# Patient Record
Sex: FEMALE | Race: WHITE | NOT HISPANIC OR LATINO | Employment: PART TIME | URBAN - METROPOLITAN AREA
[De-identification: names, ages, dates, MRNs, and addresses within clinical notes are randomized per-mention and may not be internally consistent; named-entity substitution may affect disease eponyms.]

---

## 2017-01-22 ENCOUNTER — HOSPITAL ENCOUNTER (EMERGENCY)
Facility: HOSPITAL | Age: 42
Discharge: HOME | End: 2017-01-22
Attending: EMERGENCY MEDICINE | Admitting: EMERGENCY MEDICINE

## 2017-01-22 ENCOUNTER — APPOINTMENT (EMERGENCY)
Dept: CT IMAGING | Facility: HOSPITAL | Age: 42
End: 2017-01-22

## 2017-01-22 ENCOUNTER — APPOINTMENT (EMERGENCY)
Dept: ULTRASOUND IMAGING | Facility: HOSPITAL | Age: 42
End: 2017-01-22

## 2017-01-22 VITALS
OXYGEN SATURATION: 100 % | DIASTOLIC BLOOD PRESSURE: 69 MMHG | BODY MASS INDEX: 22.47 KG/M2 | HEIGHT: 61 IN | TEMPERATURE: 97.6 F | HEART RATE: 95 BPM | SYSTOLIC BLOOD PRESSURE: 118 MMHG | WEIGHT: 119 LBS | RESPIRATION RATE: 16 BRPM

## 2017-01-22 DIAGNOSIS — K57.92 DIVERTICULITIS: Primary | ICD-10-CM

## 2017-01-22 LAB
ALBUMIN SERPL BCP-MCNC: 3.7 G/DL (ref 3.5–5)
ALP SERPL-CCNC: 51 U/L (ref 46–116)
ALT SERPL W P-5'-P-CCNC: 14 U/L (ref 12–78)
ANION GAP SERPL CALCULATED.3IONS-SCNC: 9 MMOL/L (ref 4–13)
AST SERPL W P-5'-P-CCNC: 12 U/L (ref 5–45)
BASOPHILS # BLD AUTO: 0.02 THOUSANDS/ΜL (ref 0–0.1)
BASOPHILS NFR BLD AUTO: 0 % (ref 0–1)
BILIRUB SERPL-MCNC: 0.7 MG/DL (ref 0.2–1)
BUN SERPL-MCNC: 12 MG/DL (ref 5–25)
CALCIUM SERPL-MCNC: 8.8 MG/DL (ref 8.3–10.1)
CHLORIDE SERPL-SCNC: 103 MMOL/L (ref 100–108)
CLARITY, POC: CLEAR
CO2 SERPL-SCNC: 28 MMOL/L (ref 21–32)
COLOR, POC: YELLOW
CREAT SERPL-MCNC: 0.69 MG/DL (ref 0.6–1.3)
EOSINOPHIL # BLD AUTO: 0.04 THOUSAND/ΜL (ref 0–0.61)
EOSINOPHIL NFR BLD AUTO: 0 % (ref 0–6)
ERYTHROCYTE [DISTWIDTH] IN BLOOD BY AUTOMATED COUNT: 11.8 % (ref 11.6–15.1)
EXT BILIRUBIN, UA: NEGATIVE
EXT BLOOD URINE: NEGATIVE
EXT GLUCOSE, UA: NEGATIVE
EXT KETONES: NEGATIVE
EXT NITRITE, UA: NEGATIVE
EXT PH, UA: 6
EXT PROTEIN, UA: NEGATIVE
EXT SPECIFIC GRAVITY, UA: 1.02
EXT UROBILINOGEN: 0.2
GFR SERPL CREATININE-BSD FRML MDRD: >60 ML/MIN/1.73SQ M
GLUCOSE SERPL-MCNC: 89 MG/DL (ref 65–140)
HCG UR QL: NEGATIVE
HCT VFR BLD AUTO: 38.8 % (ref 34.8–46.1)
HGB BLD-MCNC: 13.3 G/DL (ref 11.5–15.4)
LYMPHOCYTES # BLD AUTO: 2.8 THOUSANDS/ΜL (ref 0.6–4.47)
LYMPHOCYTES NFR BLD AUTO: 18 % (ref 14–44)
MCH RBC QN AUTO: 32.6 PG (ref 26.8–34.3)
MCHC RBC AUTO-ENTMCNC: 34.3 G/DL (ref 31.4–37.4)
MCV RBC AUTO: 95 FL (ref 82–98)
MONOCYTES # BLD AUTO: 1.21 THOUSAND/ΜL (ref 0.17–1.22)
MONOCYTES NFR BLD AUTO: 8 % (ref 4–12)
NEUTROPHILS # BLD AUTO: 11.67 THOUSANDS/ΜL (ref 1.85–7.62)
NEUTS SEG NFR BLD AUTO: 74 % (ref 43–75)
NRBC BLD AUTO-RTO: 0 /100 WBCS
PLATELET # BLD AUTO: 267 THOUSANDS/UL (ref 149–390)
PMV BLD AUTO: 11.3 FL (ref 8.9–12.7)
POTASSIUM SERPL-SCNC: 3.8 MMOL/L (ref 3.5–5.3)
PROT SERPL-MCNC: 7.3 G/DL (ref 6.4–8.2)
RBC # BLD AUTO: 4.08 MILLION/UL (ref 3.81–5.12)
SODIUM SERPL-SCNC: 140 MMOL/L (ref 136–145)
WBC # BLD AUTO: 15.82 THOUSAND/UL (ref 4.31–10.16)
WBC # BLD EST: NEGATIVE 10*3/UL

## 2017-01-22 PROCEDURE — 99284 EMERGENCY DEPT VISIT MOD MDM: CPT

## 2017-01-22 PROCEDURE — 96374 THER/PROPH/DIAG INJ IV PUSH: CPT

## 2017-01-22 PROCEDURE — 80053 COMPREHEN METABOLIC PANEL: CPT | Performed by: EMERGENCY MEDICINE

## 2017-01-22 PROCEDURE — 76856 US EXAM PELVIC COMPLETE: CPT

## 2017-01-22 PROCEDURE — 74177 CT ABD & PELVIS W/CONTRAST: CPT

## 2017-01-22 PROCEDURE — 85025 COMPLETE CBC W/AUTO DIFF WBC: CPT | Performed by: EMERGENCY MEDICINE

## 2017-01-22 PROCEDURE — 81025 URINE PREGNANCY TEST: CPT | Performed by: EMERGENCY MEDICINE

## 2017-01-22 PROCEDURE — 81002 URINALYSIS NONAUTO W/O SCOPE: CPT | Performed by: EMERGENCY MEDICINE

## 2017-01-22 PROCEDURE — 36415 COLL VENOUS BLD VENIPUNCTURE: CPT | Performed by: EMERGENCY MEDICINE

## 2017-01-22 RX ORDER — HYDROCODONE BITARTRATE AND ACETAMINOPHEN 5; 325 MG/1; MG/1
1 TABLET ORAL EVERY 6 HOURS PRN
Qty: 15 TABLET | Refills: 0 | Status: SHIPPED | OUTPATIENT
Start: 2017-01-22 | End: 2017-01-22 | Stop reason: CLARIF

## 2017-01-22 RX ORDER — KETOROLAC TROMETHAMINE 30 MG/ML
15 INJECTION, SOLUTION INTRAMUSCULAR; INTRAVENOUS ONCE
Status: DISCONTINUED | OUTPATIENT
Start: 2017-01-22 | End: 2017-01-22 | Stop reason: HOSPADM

## 2017-01-22 RX ORDER — TRAMADOL HYDROCHLORIDE 50 MG/1
50 TABLET ORAL EVERY 6 HOURS PRN
Qty: 15 TABLET | Refills: 0 | Status: SHIPPED | OUTPATIENT
Start: 2017-01-22 | End: 2020-10-05 | Stop reason: ALTCHOICE

## 2017-01-22 RX ORDER — KETOROLAC TROMETHAMINE 30 MG/ML
15 INJECTION, SOLUTION INTRAMUSCULAR; INTRAVENOUS ONCE
Status: COMPLETED | OUTPATIENT
Start: 2017-01-22 | End: 2017-01-22

## 2017-01-22 RX ORDER — CIPROFLOXACIN 500 MG/1
500 TABLET, FILM COATED ORAL ONCE
Status: COMPLETED | OUTPATIENT
Start: 2017-01-22 | End: 2017-01-22

## 2017-01-22 RX ORDER — METRONIDAZOLE 500 MG/1
500 TABLET ORAL ONCE
Status: COMPLETED | OUTPATIENT
Start: 2017-01-22 | End: 2017-01-22

## 2017-01-22 RX ORDER — METRONIDAZOLE 500 MG/1
500 TABLET ORAL 3 TIMES DAILY
Qty: 30 TABLET | Refills: 0 | Status: SHIPPED | OUTPATIENT
Start: 2017-01-22 | End: 2017-02-01

## 2017-01-22 RX ORDER — CIPROFLOXACIN 500 MG/1
500 TABLET, FILM COATED ORAL 2 TIMES DAILY
Qty: 20 TABLET | Refills: 0 | Status: SHIPPED | OUTPATIENT
Start: 2017-01-22 | End: 2017-02-01

## 2017-01-22 RX ADMIN — CIPROFLOXACIN HYDROCHLORIDE 500 MG: 500 TABLET, FILM COATED ORAL at 16:46

## 2017-01-22 RX ADMIN — IOHEXOL 100 ML: 350 INJECTION, SOLUTION INTRAVENOUS at 15:56

## 2017-01-22 RX ADMIN — METRONIDAZOLE 500 MG: 500 TABLET ORAL at 16:46

## 2017-01-22 RX ADMIN — KETOROLAC TROMETHAMINE 15 MG: 30 INJECTION, SOLUTION INTRAMUSCULAR at 15:16

## 2018-03-10 ENCOUNTER — HOSPITAL ENCOUNTER (EMERGENCY)
Facility: HOSPITAL | Age: 43
Discharge: HOME | End: 2018-03-10
Attending: EMERGENCY MEDICINE | Admitting: EMERGENCY MEDICINE

## 2018-03-10 VITALS
TEMPERATURE: 98.5 F | HEART RATE: 75 BPM | SYSTOLIC BLOOD PRESSURE: 115 MMHG | DIASTOLIC BLOOD PRESSURE: 72 MMHG | OXYGEN SATURATION: 100 % | HEIGHT: 61 IN | RESPIRATION RATE: 18 BRPM | BODY MASS INDEX: 22.28 KG/M2 | WEIGHT: 118 LBS

## 2018-03-10 DIAGNOSIS — R10.32 LEFT LOWER QUADRANT PAIN: Primary | ICD-10-CM

## 2018-03-10 DIAGNOSIS — K57.92 DIVERTICULITIS: ICD-10-CM

## 2018-03-10 LAB
ALBUMIN SERPL BCP-MCNC: 3.7 G/DL (ref 3.5–5)
ALP SERPL-CCNC: 56 U/L (ref 46–116)
ALT SERPL W P-5'-P-CCNC: 23 U/L (ref 12–78)
ANION GAP SERPL CALCULATED.3IONS-SCNC: 8 MMOL/L (ref 4–13)
AST SERPL W P-5'-P-CCNC: 13 U/L (ref 5–45)
BASOPHILS # BLD AUTO: 0.05 THOUSANDS/ΜL (ref 0–0.1)
BASOPHILS NFR BLD AUTO: 1 % (ref 0–1)
BILIRUB SERPL-MCNC: 0.2 MG/DL (ref 0.2–1)
BILIRUB UR QL STRIP: NEGATIVE
BUN SERPL-MCNC: 12 MG/DL (ref 5–25)
CALCIUM SERPL-MCNC: 8.9 MG/DL (ref 8.3–10.1)
CHLORIDE SERPL-SCNC: 104 MMOL/L (ref 100–108)
CLARITY UR: CLEAR
CO2 SERPL-SCNC: 28 MMOL/L (ref 21–32)
COLOR UR: YELLOW
CREAT SERPL-MCNC: 0.75 MG/DL (ref 0.6–1.3)
EOSINOPHIL # BLD AUTO: 0.2 THOUSAND/ΜL (ref 0–0.61)
EOSINOPHIL NFR BLD AUTO: 2 % (ref 0–6)
ERYTHROCYTE [DISTWIDTH] IN BLOOD BY AUTOMATED COUNT: 11.9 % (ref 11.6–15.1)
EXT PREG TEST URINE: NEGATIVE
GFR SERPL CREATININE-BSD FRML MDRD: 99 ML/MIN/1.73SQ M
GLUCOSE SERPL-MCNC: 95 MG/DL (ref 65–140)
GLUCOSE UR STRIP-MCNC: NEGATIVE MG/DL
HCT VFR BLD AUTO: 38.5 % (ref 34.8–46.1)
HGB BLD-MCNC: 13.4 G/DL (ref 11.5–15.4)
HGB UR QL STRIP.AUTO: NEGATIVE
KETONES UR STRIP-MCNC: NEGATIVE MG/DL
LACTATE SERPL-SCNC: 0.9 MMOL/L (ref 0.5–2)
LEUKOCYTE ESTERASE UR QL STRIP: NEGATIVE
LIPASE SERPL-CCNC: 95 U/L (ref 73–393)
LYMPHOCYTES # BLD AUTO: 3.07 THOUSANDS/ΜL (ref 0.6–4.47)
LYMPHOCYTES NFR BLD AUTO: 29 % (ref 14–44)
MCH RBC QN AUTO: 33.8 PG (ref 26.8–34.3)
MCHC RBC AUTO-ENTMCNC: 34.8 G/DL (ref 31.4–37.4)
MCV RBC AUTO: 97 FL (ref 82–98)
MONOCYTES # BLD AUTO: 0.6 THOUSAND/ΜL (ref 0.17–1.22)
MONOCYTES NFR BLD AUTO: 6 % (ref 4–12)
NEUTROPHILS # BLD AUTO: 6.8 THOUSANDS/ΜL (ref 1.85–7.62)
NEUTS SEG NFR BLD AUTO: 63 % (ref 43–75)
NITRITE UR QL STRIP: NEGATIVE
NRBC BLD AUTO-RTO: 0 /100 WBCS
PH UR STRIP.AUTO: 5.5 [PH] (ref 4.5–8)
PLATELET # BLD AUTO: 259 THOUSANDS/UL (ref 149–390)
PMV BLD AUTO: 10.9 FL (ref 8.9–12.7)
POTASSIUM SERPL-SCNC: 3.7 MMOL/L (ref 3.5–5.3)
PROT SERPL-MCNC: 6.6 G/DL (ref 6.4–8.2)
PROT UR STRIP-MCNC: NEGATIVE MG/DL
RBC # BLD AUTO: 3.97 MILLION/UL (ref 3.81–5.12)
SODIUM SERPL-SCNC: 140 MMOL/L (ref 136–145)
SP GR UR STRIP.AUTO: 1.01 (ref 1–1.03)
UROBILINOGEN UR QL STRIP.AUTO: 0.2 E.U./DL
WBC # BLD AUTO: 10.76 THOUSAND/UL (ref 4.31–10.16)

## 2018-03-10 PROCEDURE — 99284 EMERGENCY DEPT VISIT MOD MDM: CPT

## 2018-03-10 PROCEDURE — 81025 URINE PREGNANCY TEST: CPT | Performed by: EMERGENCY MEDICINE

## 2018-03-10 PROCEDURE — 96360 HYDRATION IV INFUSION INIT: CPT

## 2018-03-10 PROCEDURE — 83605 ASSAY OF LACTIC ACID: CPT | Performed by: EMERGENCY MEDICINE

## 2018-03-10 PROCEDURE — 36415 COLL VENOUS BLD VENIPUNCTURE: CPT | Performed by: EMERGENCY MEDICINE

## 2018-03-10 PROCEDURE — 80053 COMPREHEN METABOLIC PANEL: CPT | Performed by: EMERGENCY MEDICINE

## 2018-03-10 PROCEDURE — 83690 ASSAY OF LIPASE: CPT | Performed by: EMERGENCY MEDICINE

## 2018-03-10 PROCEDURE — 81003 URINALYSIS AUTO W/O SCOPE: CPT | Performed by: EMERGENCY MEDICINE

## 2018-03-10 PROCEDURE — 85025 COMPLETE CBC W/AUTO DIFF WBC: CPT | Performed by: EMERGENCY MEDICINE

## 2018-03-10 RX ORDER — METRONIDAZOLE 500 MG/1
500 TABLET ORAL ONCE
Status: COMPLETED | OUTPATIENT
Start: 2018-03-10 | End: 2018-03-10

## 2018-03-10 RX ORDER — METOCLOPRAMIDE HYDROCHLORIDE 5 MG/ML
10 INJECTION INTRAMUSCULAR; INTRAVENOUS ONCE
Status: DISCONTINUED | OUTPATIENT
Start: 2018-03-10 | End: 2018-03-10

## 2018-03-10 RX ORDER — METRONIDAZOLE 500 MG/1
500 TABLET ORAL EVERY 8 HOURS SCHEDULED
Qty: 21 TABLET | Refills: 0 | Status: SHIPPED | OUTPATIENT
Start: 2018-03-10 | End: 2018-03-17

## 2018-03-10 RX ORDER — CIPROFLOXACIN 500 MG/1
500 TABLET, FILM COATED ORAL EVERY 12 HOURS SCHEDULED
Qty: 14 TABLET | Refills: 0 | Status: SHIPPED | OUTPATIENT
Start: 2018-03-10 | End: 2018-03-17

## 2018-03-10 RX ORDER — KETOROLAC TROMETHAMINE 30 MG/ML
15 INJECTION, SOLUTION INTRAMUSCULAR; INTRAVENOUS ONCE
Status: DISCONTINUED | OUTPATIENT
Start: 2018-03-10 | End: 2018-03-10

## 2018-03-10 RX ORDER — ACETAMINOPHEN AND CODEINE PHOSPHATE 300; 30 MG/1; MG/1
1 TABLET ORAL EVERY 6 HOURS PRN
Qty: 8 TABLET | Refills: 0 | Status: SHIPPED | OUTPATIENT
Start: 2018-03-10 | End: 2018-03-20

## 2018-03-10 RX ORDER — CIPROFLOXACIN 500 MG/1
500 TABLET, FILM COATED ORAL ONCE
Status: COMPLETED | OUTPATIENT
Start: 2018-03-10 | End: 2018-03-10

## 2018-03-10 RX ADMIN — CIPROFLOXACIN HYDROCHLORIDE 500 MG: 500 TABLET, FILM COATED ORAL at 01:38

## 2018-03-10 RX ADMIN — SODIUM CHLORIDE 1000 ML: 0.9 INJECTION, SOLUTION INTRAVENOUS at 01:45

## 2018-03-10 RX ADMIN — METRONIDAZOLE 500 MG: 500 TABLET ORAL at 01:39

## 2018-03-10 NOTE — ED PROVIDER NOTES
History  Chief Complaint   Patient presents with    Abdominal Pain     Pt c/o lower abdominal pain worsening over the past 4 hours  Also c/o nausea, but denies vomiting or diarrhea  Pt states it feels "just like diverticulitis " Also c/o chills     Patient is a 70-year-old female with past medical history of migraines and 1 prior episode of diverticulitis about 6 months ago, and surgical history of appendectomy 10 years ago, presents to the emergency department complaining of lower abdominal pain, nausea and chills  Patient states this afternoon she began having lower abdominal pain, more localized to the left lower quadrant  It initially started as crampy pain but has been getting progressively worse throughout the day  She also has felt intermittent nausea and has had alternating chills and sweats  She took Advil for the pain at around midnight however decided to come to the ED because her symptoms feel exactly the same as when she was diagnosed with diverticulitis last summer  She states when she was diagnosed with diverticulitis, she left the pain go for about 3 days and got very sick  She states today, she did not want to wait and wanted to get started on antibiotics because her symptoms feel similar  On review of systems, she does admit to feeling constipated over the past 2 days  She states she is still having bowel movements but they are very small and hard  She denies any fever, headache, dizziness or near syncope, URI symptoms or cough, chest pain, palpitations, shortness of breath, vomiting, diarrhea, blood per rectum or melena, dysuria, frequency, hematuria, flank pain, vaginal bleeding or discharge, skin rash or color change, extremity weakness or paresthesia or other focal neurologic deficits  History provided by:  Patient   used: No        Prior to Admission Medications   Prescriptions Last Dose Informant Patient Reported?  Taking?   traMADol (ULTRAM) 50 mg tablet No No   Sig: Take 1 tablet by mouth every 6 (six) hours as needed for moderate pain for up to 15 doses      Facility-Administered Medications: None       History reviewed  No pertinent past medical history  Past Surgical History:   Procedure Laterality Date    APPENDECTOMY         History reviewed  No pertinent family history  I have reviewed and agree with the history as documented  Social History   Substance Use Topics    Smoking status: Former Smoker     Quit date: 12/16/2014    Smokeless tobacco: Never Used    Alcohol use No        Review of Systems   Constitutional: Positive for chills  Negative for fever  HENT: Negative for congestion, ear pain, rhinorrhea and sore throat  Respiratory: Negative for cough, chest tightness, shortness of breath and wheezing  Cardiovascular: Negative for chest pain and palpitations  Gastrointestinal: Positive for abdominal pain, constipation and nausea  Negative for abdominal distention, blood in stool, diarrhea and vomiting  Genitourinary: Negative for difficulty urinating, dysuria, flank pain, frequency, hematuria, vaginal bleeding and vaginal discharge  Musculoskeletal: Negative for back pain and neck pain  Skin: Negative for color change, pallor and rash  Allergic/Immunologic: Negative for immunocompromised state  Neurological: Negative for dizziness, syncope, weakness, light-headedness, numbness and headaches  Hematological: Negative for adenopathy  Psychiatric/Behavioral: Negative for confusion and decreased concentration  All other systems reviewed and are negative        Physical Exam  ED Triage Vitals   Temperature Pulse Respirations Blood Pressure SpO2   03/10/18 0024 03/10/18 0024 03/10/18 0024 03/10/18 0024 03/10/18 0024   98 5 °F (36 9 °C) 99 18 (!) 180/80 99 %      Temp Source Heart Rate Source Patient Position - Orthostatic VS BP Location FiO2 (%)   03/10/18 0024 03/10/18 0024 03/10/18 0024 03/10/18 0024 --   Oral Monitor Lying Right arm       Pain Score       03/10/18 0025       8         Vitals:    03/10/18 0024 03/10/18 0025 03/10/18 0226   BP: (!) 180/80  115/72   BP Location: Right arm  Right arm   Pulse: 99  75   Resp: 18 18   Temp: 98 5 °F (36 9 °C)     TempSrc: Oral     SpO2: 99%  100%   Weight:  53 5 kg (118 lb)    Height:  5' 1" (1 549 m)      Physical Exam   Constitutional: She is oriented to person, place, and time  She appears well-developed and well-nourished  No distress  HENT:   Head: Normocephalic and atraumatic  Mouth/Throat: Oropharynx is clear and moist    Eyes: Conjunctivae and EOM are normal  Pupils are equal, round, and reactive to light  Neck: Normal range of motion  Neck supple  No JVD present  Cardiovascular: Normal rate, regular rhythm, normal heart sounds and intact distal pulses  Exam reveals no gallop and no friction rub  No murmur heard  Pulmonary/Chest: Effort normal and breath sounds normal  No respiratory distress  She has no wheezes  She has no rales  She exhibits no tenderness  Abdominal: Soft  Bowel sounds are normal  She exhibits no distension  There is tenderness  There is no rebound and no guarding  Tenderness in the left lower quadrant  No CVA tenderness  Musculoskeletal: Normal range of motion  She exhibits no edema or tenderness  Neurological: She is alert and oriented to person, place, and time  No gross motor or sensory deficits  Skin: Skin is warm and dry  No rash noted  She is not diaphoretic  No erythema  No pallor  Psychiatric: She has a normal mood and affect  Her behavior is normal    Nursing note and vitals reviewed        ED Medications  Medications   sodium chloride 0 9 % bolus 1,000 mL (0 mL Intravenous Stopped 3/10/18 0226)   ciprofloxacin (CIPRO) tablet 500 mg (500 mg Oral Given 3/10/18 0138)   metroNIDAZOLE (FLAGYL) tablet 500 mg (500 mg Oral Given 3/10/18 0139)       Diagnostic Studies  Results Reviewed     Procedure Component Value Units Date/Time POCT pregnancy, urine [[id number]]  (Normal) Resulted:  03/10/18 0220    Lab Status:  Final result Updated:  03/10/18 0220     EXT PREG TEST UR (Ref: Negative) negative    Lactic acid, plasma [[id number]]  (Normal) Collected:  03/10/18 0145    Lab Status:  Final result Specimen:  Blood from Arm, Right Updated:  03/10/18 4859     LACTIC ACID 0 9 mmol/L     Narrative:         Result may be elevated if tourniquet was used during collection  Comprehensive metabolic panel [[id number]] Collected:  03/10/18 0145    Lab Status:  Final result Specimen:  Blood from Arm, Right Updated:  03/10/18 0210     Sodium 140 mmol/L      Potassium 3 7 mmol/L      Chloride 104 mmol/L      CO2 28 mmol/L      Anion Gap 8 mmol/L      BUN 12 mg/dL      Creatinine 0 75 mg/dL      Glucose 95 mg/dL      Calcium 8 9 mg/dL      AST 13 U/L      ALT 23 U/L      Alkaline Phosphatase 56 U/L      Total Protein 6 6 g/dL      Albumin 3 7 g/dL      Total Bilirubin 0 20 mg/dL      eGFR 99 ml/min/1 73sq m     Narrative:         National Kidney Disease Education Program recommendations are as follows:  GFR calculation is accurate only with a steady state creatinine  Chronic Kidney disease less than 60 ml/min/1 73 sq  meters  Kidney failure less than 15 ml/min/1 73 sq  meters      Lipase [[id number]]  (Normal) Collected:  03/10/18 0145    Lab Status:  Final result Specimen:  Blood from Arm, Right Updated:  03/10/18 0210     Lipase 95 u/L     UA w Reflex to Microscopic [[id number]]  (Normal) Collected:  03/10/18 0145    Lab Status:  Final result Specimen:  Urine from Urine, Clean Catch Updated:  03/10/18 0156     Color, UA Yellow     Clarity, UA Clear     Specific Gravity, UA 1 010     pH, UA 5 5     Leukocytes, UA Negative     Nitrite, UA Negative     Protein, UA Negative mg/dl      Glucose, UA Negative mg/dl      Ketones, UA Negative mg/dl      Urobilinogen, UA 0 2 E U /dl      Bilirubin, UA Negative     Blood, UA Negative    CBC and differential [[id number]] (Abnormal) Collected:  03/10/18 0145    Lab Status:  Final result Specimen:  Blood from Arm, Right Updated:  03/10/18 0154     WBC 10 76 (H) Thousand/uL      RBC 3 97 Million/uL      Hemoglobin 13 4 g/dL      Hematocrit 38 5 %      MCV 97 fL      MCH 33 8 pg      MCHC 34 8 g/dL      RDW 11 9 %      MPV 10 9 fL      Platelets 919 Thousands/uL      nRBC 0 /100 WBCs      Neutrophils Relative 63 %      Lymphocytes Relative 29 %      Monocytes Relative 6 %      Eosinophils Relative 2 %      Basophils Relative 1 %      Neutrophils Absolute 6 80 Thousands/µL      Lymphocytes Absolute 3 07 Thousands/µL      Monocytes Absolute 0 60 Thousand/µL      Eosinophils Absolute 0 20 Thousand/µL      Basophils Absolute 0 05 Thousands/µL                  No orders to display              Procedures  Procedures       Phone Contacts  ED Phone Contact    ED Course  ED Course as of Mar 10 0233   Sat Mar 10, 2018   0219 Patient updated that her blood work was unremarkable  Will send home with scripts for Cipro and Flagyl  Offered her pain medication to go home with and she states the only thing she can tolerate is Tylenol with codeine  Discussed ED return parameters including but not limited to worsening or uncontrolled pain, vomiting, fever  MDM  Number of Diagnoses or Management Options  Diagnosis management comments: 25-year-old female presenting with 1 day of left lower quadrant abdominal pain, nausea, constipation and chills  Patient has history of diverticulitis and states that her symptoms feel exactly the same as when she presented with diverticulitis 6 months ago  Recommended getting blood work and abdominal CT scan to confirm diverticulitis, rule out other etiology or complications such as abscess or perforation    Patient does not wish to have the CT scan as she does not have medical insurance and states she is very behind on medical bills and is trying to support her 2 children who are and [facility] on her own  Explained to patient that the CT can exclude complications from diverticulitis such as abscess or perforation, however we came to an agreement to start with blood work and if her blood work is not significantly abnormal, will empirically treat with Cipro and Flagyl and forego this CT scan for now  She is agreeable to get the CT scan if lab work significantly abnormal such as significant leukocytosis or elevated lactic acidosis  Will give IV fluid bolus  Offered Toradol and antiemetic but patient declined and that she is very sensitive to medication and does not wish to have anything at this time  Patient told that whether or not we get the CT scan, if her symptoms are worsening she should return to the ER immediately  Amount and/or Complexity of Data Reviewed  Clinical lab tests: ordered and reviewed      CritCare Time    Disposition  Final diagnoses:   Left lower quadrant pain   Diverticulitis     Time reflects when diagnosis was documented in both MDM as applicable and the Disposition within this note     Time User Action Codes Description Comment    3/10/2018  2:23 AM Moisés PAREDES Add [R10 32] Left lower quadrant pain     3/10/2018  2:23 AM Rufina Noble Add [K57 92] Diverticulitis       ED Disposition     ED Disposition Condition Comment    Discharge  Desirae Corrigan discharge to home/self care      Condition at discharge: Stable        Follow-up Information     Follow up With Specialties Details Why Contact Info Additional Information    [employer]fol[employer]  Call To establish care with a primary care doctor if you do not already have one [address] [facility] Emergency Department Emergency Medicine Go to If symptoms worsen [address] [facility] [address], [address] [place], [place], [address]        Discharge Medication List as of 3/10/2018  2:25 AM      START taking these medications    Details acetaminophen-codeine (TYLENOL #3) 300-30 mg per tablet Take 1 tablet by mouth every 6 (six) hours as needed for moderate pain for up to 10 days, Starting Sat 3/10/2018, Until Tue 3/20/2018, Print      ciprofloxacin (CIPRO) 500 mg tablet Take 1 tablet (500 mg total) by mouth every 12 (twelve) hours for 7 days, Starting Sat 3/10/2018, Until Sat 3/17/2018, Print      metroNIDAZOLE (FLAGYL) 500 mg tablet Take 1 tablet (500 mg total) by mouth every 8 (eight) hours for 7 days, Starting Sat 3/10/2018, Until Sat 3/17/2018, Print         CONTINUE these medications which have NOT CHANGED    Details   traMADol (ULTRAM) 50 mg tablet Take 1 tablet by mouth every 6 (six) hours as needed for moderate pain for up to 15 doses, Starting 1/22/2017, Until Discontinued, Print           No discharge procedures on file      ED Provider  Electronically Signed by           Kd Smith DO  03/10/18 1319

## 2018-03-10 NOTE — DISCHARGE INSTRUCTIONS
Diverticulitis   WHAT YOU NEED TO KNOW:   Diverticulitis is a condition that causes small pockets along your intestine called diverticula to become inflamed or infected  This is caused by hard bowel movements, food, or bacteria that get stuck in the pockets  DISCHARGE INSTRUCTIONS:   Seek care immediately if:   · You have bowel movement or foul-smelling discharge leaking from your vagina or in your urine  · You have severe diarrhea  · You urinate less than usual or not at all  · You are not able to have a bowel movement  · You cannot stop vomiting  · You have severe abdominal pain, a fever, and your abdomen is larger than usual      · You have new or increased blood in your bowel movements  Contact your healthcare provider if:   · You have pain when you urinate  · Your symptoms get worse or do not go away  · You have questions or concerns about your condition or care  Medicines:   · Antibiotics  may be given to help prevent or treat a bacterial infection  · Prescription pain medicine  may be given  Ask your healthcare provider how to take this medicine safely  Some prescription pain medicines contain acetaminophen  Do not take other medicines that contain acetaminophen without talking to your healthcare provider  Too much acetaminophen may cause liver damage  Prescription pain medicine may cause constipation  Ask your healthcare provider how to prevent or treat constipation  · Take your medicine as directed  Contact your healthcare provider if you think your medicine is not helping or if you have side effects  Tell him or her if you are allergic to any medicine  Keep a list of the medicines, vitamins, and herbs you take  Include the amounts, and when and why you take them  Bring the list or the pill bottles to follow-up visits  Carry your medicine list with you in case of an emergency    Clear liquid diet:  A clear liquid diet includes any liquids that you can see through  Examples include water, ginger-margarita, cranberry or apple juice, frozen fruit ice, or broth  Stay on a clear liquid diet until your symptoms are gone, or as directed  Follow up with your healthcare provider as directed: You may need to return for a colonoscopy  When your symptoms are gone, you may need a low-fat, high-fiber diet to help prevent diverticulitis from developing again  Your healthcare provider or dietitian can help you create meal plans  Write down your questions so you remember to ask them during your visits  © 2017 [employer] Information is for End User's use only and may not be sold, redistributed or otherwise used for commercial purposes  All illustrations and images included in CareNotes® are the copyrighted property of [employer]  or [employer]  The above information is an  only  It is not intended as medical advice for individual conditions or treatments  Talk to your doctor, nurse or pharmacist before following any medical regimen to see if it is safe and effective for you  Acute Abdominal Pain   WHAT YOU NEED TO KNOW:   The cause of your abdominal pain may not be found  If a cause is found, treatment will depend on what the cause is  DISCHARGE INSTRUCTIONS:   Seek care immediately if:   · You vomit blood or cannot stop vomiting  · You have blood in your bowel movement or it looks like tar  · You have bleeding from your rectum  · Your abdomen is larger than usual, more painful, and hard  · You have severe pain in your abdomen  · You stop passing gas and having bowel movements  · You feel weak, dizzy, or faint  Contact your healthcare provider if:   · You have a fever  · You have new signs and symptoms  · Your symptoms do not get better with treatment  · You have questions or concerns about your condition or care    Medicines  may be given to decrease pain, treat an infection, and manage your symptoms  Take your medicine as directed  Call your healthcare provider if you think your medicine is not helping or if you have side effects  Tell him if you are allergic to any medicine  Keep a list of the medicines, vitamins, and herbs you take  Include the amounts, and when and why you take them  Bring the list or the pill bottles to follow-up visits  Carry your medicine list with you in case of an emergency  Manage your symptoms:   · Apply heat  on your abdomen for 20 to 30 minutes every 2 hours for as many days as directed  Heat helps decrease pain and muscle spasms  · Manage your stress  Stress may cause abdominal pain  Your healthcare provider may recommend relaxation techniques and deep breathing exercises to help decrease your stress  Your healthcare provider may recommend you talk to someone about your stress or anxiety, such as a counselor or a trusted friend  Get plenty of sleep and exercise regularly  · Limit or do not drink alcohol  Alcohol can make your abdominal pain worse  Ask your healthcare provider if it is safe for you to drink alcohol  Also ask how much is safe for you to drink  · Do not smoke  Nicotine and other chemicals in cigarettes can damage your esophagus and stomach  Ask your healthcare provider for information if you currently smoke and need help to quit  E-cigarettes or smokeless tobacco still contain nicotine  Talk to your healthcare provider before you use these products  Make changes to the food you eat as directed:  Do not eat foods that cause abdominal pain or other symptoms  Eat small meals more often  · Eat more high-fiber foods if you are constipated  High-fiber foods include fruits, vegetables, whole-grain foods, and legumes  · Do not eat foods that cause gas if you have bloating  Examples include broccoli, cabbage, and cauliflower  Do not drink soda or carbonated drinks, because these may also cause gas       · Do not eat foods or drinks that contain sorbitol or fructose if you have diarrhea and bloating  Some examples are fruit juices, candy, jelly, and sugar-free gum  · Do not eat high-fat foods, such as fried foods, cheeseburgers, hot dogs, and desserts  · Limit or do not drink caffeine  Caffeine may make symptoms, such as heart burn or nausea, worse  · Drink plenty of liquids to prevent dehydration from diarrhea or vomiting  Ask your healthcare provider how much liquid to drink each day and which liquids are best for you  Follow up with your healthcare provider as directed:  Write down your questions so you remember to ask them during your visits  © 2017 [address] [facility] Information is for End User's use only and may not be sold, redistributed or otherwise used for commercial purposes  All illustrations and images included in CareNotes® are the copyrighted property of [employer]  or [employer]  The above information is an  only  It is not intended as medical advice for individual conditions or treatments  Talk to your doctor, nurse or pharmacist before following any medical regimen to see if it is safe and effective for you

## 2020-09-24 ENCOUNTER — APPOINTMENT (OUTPATIENT)
Dept: LAB | Facility: HOSPITAL | Age: 45
End: 2020-09-24
Attending: PLASTIC SURGERY

## 2020-09-24 ENCOUNTER — TRANSCRIBE ORDERS (OUTPATIENT)
Dept: LAB | Facility: HOSPITAL | Age: 45
End: 2020-09-24

## 2020-09-24 ENCOUNTER — TRANSCRIBE ORDERS (OUTPATIENT)
Dept: ADMINISTRATIVE | Facility: HOSPITAL | Age: 45
End: 2020-09-24

## 2020-09-24 DIAGNOSIS — Z41.1 ENCOUNTER FOR COSMETIC PROCEDURE: ICD-10-CM

## 2020-09-24 DIAGNOSIS — Z01.812 PRE-OPERATIVE LABORATORY EXAMINATION: Primary | ICD-10-CM

## 2020-09-24 LAB
APTT PPP: 27 SECONDS (ref 23–37)
BASOPHILS # BLD AUTO: 0 THOUSANDS/ΜL (ref 0–0.1)
BASOPHILS NFR BLD AUTO: 0 % (ref 0–1)
EOSINOPHIL # BLD AUTO: 0.1 THOUSAND/ΜL (ref 0–0.4)
EOSINOPHIL NFR BLD AUTO: 1 % (ref 0–6)
ERYTHROCYTE [DISTWIDTH] IN BLOOD BY AUTOMATED COUNT: 12.2 %
HCT VFR BLD AUTO: 40.7 % (ref 36–46)
HGB BLD-MCNC: 13.8 G/DL (ref 12–16)
INR PPP: 0.93 (ref 0.84–1.19)
LYMPHOCYTES # BLD AUTO: 2.6 THOUSANDS/ΜL (ref 0.5–4)
LYMPHOCYTES NFR BLD AUTO: 28 % (ref 25–45)
MCH RBC QN AUTO: 33.4 PG (ref 26–34)
MCHC RBC AUTO-ENTMCNC: 34 G/DL (ref 31–36)
MCV RBC AUTO: 98 FL (ref 80–100)
MONOCYTES # BLD AUTO: 0.5 THOUSAND/ΜL (ref 0.2–0.9)
MONOCYTES NFR BLD AUTO: 5 % (ref 1–10)
NEUTROPHILS # BLD AUTO: 6.2 THOUSANDS/ΜL (ref 1.8–7.8)
NEUTS SEG NFR BLD AUTO: 66 % (ref 45–65)
PLATELET # BLD AUTO: 279 THOUSANDS/UL (ref 150–450)
PMV BLD AUTO: 9.6 FL (ref 8.9–12.7)
PROTHROMBIN TIME: 12.5 SECONDS (ref 11.6–14.5)
RBC # BLD AUTO: 4.15 MILLION/UL (ref 4–5.2)
WBC # BLD AUTO: 9.5 THOUSAND/UL (ref 4.5–11)

## 2020-09-24 PROCEDURE — 85025 COMPLETE CBC W/AUTO DIFF WBC: CPT

## 2020-09-24 PROCEDURE — 85610 PROTHROMBIN TIME: CPT

## 2020-09-24 PROCEDURE — 85730 THROMBOPLASTIN TIME PARTIAL: CPT

## 2020-09-24 PROCEDURE — 36415 COLL VENOUS BLD VENIPUNCTURE: CPT

## 2020-09-28 ENCOUNTER — NURSE TRIAGE (OUTPATIENT)
Dept: OTHER | Facility: OTHER | Age: 45
End: 2020-09-28

## 2020-09-28 DIAGNOSIS — Z11.59 SPECIAL SCREENING EXAMINATION FOR VIRAL DISEASE: Primary | ICD-10-CM

## 2020-09-28 DIAGNOSIS — Z11.59 SPECIAL SCREENING EXAMINATION FOR VIRAL DISEASE: ICD-10-CM

## 2020-09-28 PROCEDURE — U0003 INFECTIOUS AGENT DETECTION BY NUCLEIC ACID (DNA OR RNA); SEVERE ACUTE RESPIRATORY SYNDROME CORONAVIRUS 2 (SARS-COV-2) (CORONAVIRUS DISEASE [COVID-19]), AMPLIFIED PROBE TECHNIQUE, MAKING USE OF HIGH THROUGHPUT TECHNOLOGIES AS DESCRIBED BY CMS-2020-01-R: HCPCS | Performed by: FAMILY MEDICINE

## 2020-09-28 NOTE — TELEPHONE ENCOUNTER
Reason for Disposition   [1] COVID-19 EXPOSURE (Close Contact) within last 14 days AND [2] NO cough, fever, or breathing difficulty    Answer Assessment - Initial Assessment Questions  1  CLOSE CONTACT: "Who is the person with the confirmed or suspected COVID-19 infection that you were exposed to?"      Unknown  2  PLACE of CONTACT: "Where were you when you were exposed to COVID-19?" (e g , home, school, medical waiting room; which city?)      Unknown  3  TYPE of CONTACT: "How much contact was there?" (e g , sitting next to, live in same house, work in same office, same building)      Unknown  4  DURATION of CONTACT: "How long were you in contact with the COVID-19 patient?" (e g , a few seconds, passed by person, a few minutes, live with the patient)      Unknown  5  DATE of CONTACT: "When did you have contact with a COVID-19 patient?" (e g , how many days ago)     Unknown  6  TRAVEL: "Have you traveled out of the country recently?" If so, "When and where?"      * Also ask about out-of-state travel, since the [facility] has identified some high risk cities for community spread in the [address] [place] [address] [address]  * Note: Travel becomes less relevant if there is widespread community transmission where the patient lives  Denies  7  COMMUNITY SPREAD: "Are there lots of cases of COVID-19 (community spread) where you live?" (See public health department website, if unsure)    * MAJOR community spread: high number of cases; numbers of cases are increasing; many people hospitalized  * MINOR community spread: low number of cases; not increasing; few or no people hospitalized      Major   8  SYMPTOMS: "Do you have any symptoms?" (e g , fever, cough, breathing difficulty)      Denies symptoms  9  PREGNANCY OR POSTPARTUM: "Is there any chance you are pregnant?" "When was your last menstrual period?" "Did you deliver in the last 2 weeks?"      Denies, LMP 3 weeks ago,  tubal liation  10  HIGH RISK: "Do you have any heart or lung problems?  Do you have a weak immune system?" (e g , CHF, COPD, asthma, HIV positive, chemotherapy, renal failure, diabetes mellitus, sickle cell anemia)        Heealthy    Protocols used: CORONAVIRUS (COVID-19) - EXPOSURE-ADULT-AH

## 2020-09-28 NOTE — TELEPHONE ENCOUNTER
Regarding: COVID TEST REQUEST ASYMPTOMATIC PRESURGERY  ----- Message from Bart Carrera sent at 9/28/2020  9:28 AM EDT -----  " I have to be tested for surgery  "

## 2020-09-29 LAB — SARS-COV-2 RNA SPEC QL NAA+PROBE: NOT DETECTED

## 2020-10-07 ENCOUNTER — ANESTHESIA EVENT (OUTPATIENT)
Dept: PERIOP | Facility: HOSPITAL | Age: 45
End: 2020-10-07
Payer: SELF-PAY

## 2020-10-08 ENCOUNTER — HOSPITAL ENCOUNTER (OUTPATIENT)
Facility: HOSPITAL | Age: 45
Setting detail: OBSERVATION
Discharge: HOME | End: 2020-10-09
Attending: PLASTIC SURGERY | Admitting: PLASTIC SURGERY
Payer: SELF-PAY

## 2020-10-08 ENCOUNTER — ANESTHESIA (OUTPATIENT)
Dept: PERIOP | Facility: HOSPITAL | Age: 45
End: 2020-10-08
Payer: SELF-PAY

## 2020-10-08 VITALS — HEART RATE: 95 BPM

## 2020-10-08 DIAGNOSIS — E88.1 LIPODYSTROPHY: Primary | ICD-10-CM

## 2020-10-08 LAB
EXT PREGNANCY TEST URINE: NEGATIVE
EXT. CONTROL: NORMAL

## 2020-10-08 PROCEDURE — G0379 DIRECT REFER HOSPITAL OBSERV: HCPCS

## 2020-10-08 PROCEDURE — 81025 URINE PREGNANCY TEST: CPT | Performed by: PLASTIC SURGERY

## 2020-10-08 RX ORDER — CEFAZOLIN SODIUM 1 G/50ML
1000 SOLUTION INTRAVENOUS EVERY 8 HOURS
Status: COMPLETED | OUTPATIENT
Start: 2020-10-08 | End: 2020-10-09

## 2020-10-08 RX ORDER — HYDROMORPHONE HCL 110MG/55ML
PATIENT CONTROLLED ANALGESIA SYRINGE INTRAVENOUS AS NEEDED
Status: DISCONTINUED | OUTPATIENT
Start: 2020-10-08 | End: 2020-10-08

## 2020-10-08 RX ORDER — OXYCODONE HYDROCHLORIDE AND ACETAMINOPHEN 5; 325 MG/1; MG/1
1 TABLET ORAL EVERY 4 HOURS PRN
Status: DISCONTINUED | OUTPATIENT
Start: 2020-10-08 | End: 2020-10-09 | Stop reason: HOSPADM

## 2020-10-08 RX ORDER — HYDROMORPHONE HCL/PF 1 MG/ML
1 SYRINGE (ML) INJECTION EVERY 2 HOUR PRN
Status: DISCONTINUED | OUTPATIENT
Start: 2020-10-08 | End: 2020-10-09 | Stop reason: HOSPADM

## 2020-10-08 RX ORDER — FENTANYL CITRATE 50 UG/ML
INJECTION, SOLUTION INTRAMUSCULAR; INTRAVENOUS AS NEEDED
Status: DISCONTINUED | OUTPATIENT
Start: 2020-10-08 | End: 2020-10-08

## 2020-10-08 RX ORDER — DEXAMETHASONE SODIUM PHOSPHATE 4 MG/ML
INJECTION, SOLUTION INTRA-ARTICULAR; INTRALESIONAL; INTRAMUSCULAR; INTRAVENOUS; SOFT TISSUE AS NEEDED
Status: DISCONTINUED | OUTPATIENT
Start: 2020-10-08 | End: 2020-10-08

## 2020-10-08 RX ORDER — SCOLOPAMINE TRANSDERMAL SYSTEM 1 MG/1
1 PATCH, EXTENDED RELEASE TRANSDERMAL
Status: DISCONTINUED | OUTPATIENT
Start: 2020-10-08 | End: 2020-10-09 | Stop reason: HOSPADM

## 2020-10-08 RX ORDER — GLYCOPYRROLATE 0.2 MG/ML
INJECTION INTRAMUSCULAR; INTRAVENOUS AS NEEDED
Status: DISCONTINUED | OUTPATIENT
Start: 2020-10-08 | End: 2020-10-08

## 2020-10-08 RX ORDER — NEOSTIGMINE METHYLSULFATE 1 MG/ML
INJECTION INTRAVENOUS AS NEEDED
Status: DISCONTINUED | OUTPATIENT
Start: 2020-10-08 | End: 2020-10-08

## 2020-10-08 RX ORDER — ONDANSETRON 2 MG/ML
4 INJECTION INTRAMUSCULAR; INTRAVENOUS ONCE AS NEEDED
Status: COMPLETED | OUTPATIENT
Start: 2020-10-08 | End: 2020-10-08

## 2020-10-08 RX ORDER — ROCURONIUM BROMIDE 10 MG/ML
INJECTION, SOLUTION INTRAVENOUS AS NEEDED
Status: DISCONTINUED | OUTPATIENT
Start: 2020-10-08 | End: 2020-10-08

## 2020-10-08 RX ORDER — DIPHENHYDRAMINE HYDROCHLORIDE 50 MG/ML
25 INJECTION INTRAMUSCULAR; INTRAVENOUS EVERY 6 HOURS PRN
Status: DISCONTINUED | OUTPATIENT
Start: 2020-10-08 | End: 2020-10-09 | Stop reason: HOSPADM

## 2020-10-08 RX ORDER — LIDOCAINE HYDROCHLORIDE 10 MG/ML
INJECTION, SOLUTION EPIDURAL; INFILTRATION; INTRACAUDAL; PERINEURAL AS NEEDED
Status: DISCONTINUED | OUTPATIENT
Start: 2020-10-08 | End: 2020-10-08

## 2020-10-08 RX ORDER — SODIUM CHLORIDE, SODIUM LACTATE, POTASSIUM CHLORIDE, CALCIUM CHLORIDE 600; 310; 30; 20 MG/100ML; MG/100ML; MG/100ML; MG/100ML
50 INJECTION, SOLUTION INTRAVENOUS CONTINUOUS
Status: DISCONTINUED | OUTPATIENT
Start: 2020-10-08 | End: 2020-10-09 | Stop reason: HOSPADM

## 2020-10-08 RX ORDER — ONDANSETRON 2 MG/ML
4 INJECTION INTRAMUSCULAR; INTRAVENOUS EVERY 6 HOURS PRN
Status: DISCONTINUED | OUTPATIENT
Start: 2020-10-08 | End: 2020-10-09 | Stop reason: HOSPADM

## 2020-10-08 RX ORDER — PROPOFOL 10 MG/ML
INJECTION, EMULSION INTRAVENOUS AS NEEDED
Status: DISCONTINUED | OUTPATIENT
Start: 2020-10-08 | End: 2020-10-08

## 2020-10-08 RX ORDER — DEXTROSE MONOHYDRATE AND SODIUM CHLORIDE 5; .45 G/100ML; G/100ML
125 INJECTION, SOLUTION INTRAVENOUS CONTINUOUS
Status: DISCONTINUED | OUTPATIENT
Start: 2020-10-08 | End: 2020-10-09 | Stop reason: HOSPADM

## 2020-10-08 RX ORDER — BUTALBITAL, ACETAMINOPHEN AND CAFFEINE 50; 325; 40 MG/1; MG/1; MG/1
1 TABLET ORAL EVERY 4 HOURS PRN
Status: COMPLETED | OUTPATIENT
Start: 2020-10-08 | End: 2020-10-08

## 2020-10-08 RX ORDER — HYDROMORPHONE HCL/PF 1 MG/ML
0.5 SYRINGE (ML) INJECTION
Status: COMPLETED | OUTPATIENT
Start: 2020-10-08 | End: 2020-10-08

## 2020-10-08 RX ORDER — ONDANSETRON 2 MG/ML
INJECTION INTRAMUSCULAR; INTRAVENOUS AS NEEDED
Status: DISCONTINUED | OUTPATIENT
Start: 2020-10-08 | End: 2020-10-08

## 2020-10-08 RX ORDER — NEOMYCIN SULFATE, POLYMYXIN B SULFATE, AND DEXAMETHASONE 3.5; 10000; 1 MG/G; [USP'U]/G; MG/G
OINTMENT OPHTHALMIC 3 TIMES DAILY
Status: DISCONTINUED | OUTPATIENT
Start: 2020-10-08 | End: 2020-10-09 | Stop reason: HOSPADM

## 2020-10-08 RX ORDER — LIDOCAINE HYDROCHLORIDE AND EPINEPHRINE 5; 5 MG/ML; UG/ML
INJECTION, SOLUTION INFILTRATION; PERINEURAL AS NEEDED
Status: DISCONTINUED | OUTPATIENT
Start: 2020-10-08 | End: 2020-10-08 | Stop reason: HOSPADM

## 2020-10-08 RX ORDER — MAGNESIUM HYDROXIDE 1200 MG/15ML
LIQUID ORAL AS NEEDED
Status: DISCONTINUED | OUTPATIENT
Start: 2020-10-08 | End: 2020-10-08 | Stop reason: HOSPADM

## 2020-10-08 RX ORDER — MIDAZOLAM HYDROCHLORIDE 2 MG/2ML
INJECTION, SOLUTION INTRAMUSCULAR; INTRAVENOUS AS NEEDED
Status: DISCONTINUED | OUTPATIENT
Start: 2020-10-08 | End: 2020-10-08

## 2020-10-08 RX ORDER — CEFAZOLIN SODIUM 1 G/50ML
1000 SOLUTION INTRAVENOUS ONCE
Status: COMPLETED | OUTPATIENT
Start: 2020-10-08 | End: 2020-10-08

## 2020-10-08 RX ADMIN — CEFAZOLIN SODIUM 1000 MG: 1 SOLUTION INTRAVENOUS at 07:35

## 2020-10-08 RX ADMIN — FENTANYL CITRATE 100 MCG: 50 INJECTION INTRAMUSCULAR; INTRAVENOUS at 08:15

## 2020-10-08 RX ADMIN — GLYCOPYRROLATE 0.4 MG: 0.2 INJECTION, SOLUTION INTRAMUSCULAR; INTRAVENOUS at 11:15

## 2020-10-08 RX ADMIN — ROCURONIUM BROMIDE 20 MG: 10 INJECTION, SOLUTION INTRAVENOUS at 09:02

## 2020-10-08 RX ADMIN — NEOMYCIN AND POLYMYXIN B SULFATES AND DEXAMETHASONE 1 INCH: 3.5; 10000; 1 OINTMENT OPHTHALMIC at 20:27

## 2020-10-08 RX ADMIN — HYDROMORPHONE HYDROCHLORIDE 1 MG: 1 INJECTION, SOLUTION INTRAMUSCULAR; INTRAVENOUS; SUBCUTANEOUS at 19:40

## 2020-10-08 RX ADMIN — BUTALBITAL, ACETAMINOPHEN, AND CAFFEINE 1 TABLET: 50; 325; 40 TABLET ORAL at 06:38

## 2020-10-08 RX ADMIN — HYDROMORPHONE HYDROCHLORIDE 0.5 MG: 1 INJECTION, SOLUTION INTRAMUSCULAR; INTRAVENOUS; SUBCUTANEOUS at 12:15

## 2020-10-08 RX ADMIN — ONDANSETRON HYDROCHLORIDE 4 MG: 2 INJECTION, SOLUTION INTRAMUSCULAR; INTRAVENOUS at 10:48

## 2020-10-08 RX ADMIN — ONDANSETRON 4 MG: 2 INJECTION INTRAMUSCULAR; INTRAVENOUS at 11:52

## 2020-10-08 RX ADMIN — DEXTROSE AND SODIUM CHLORIDE 125 ML/HR: 5; .45 INJECTION, SOLUTION INTRAVENOUS at 12:46

## 2020-10-08 RX ADMIN — LIDOCAINE HYDROCHLORIDE 50 MG: 10 INJECTION, SOLUTION EPIDURAL; INFILTRATION; INTRACAUDAL; PERINEURAL at 07:39

## 2020-10-08 RX ADMIN — HYDROMORPHONE HYDROCHLORIDE 0.5 MG: 1 INJECTION, SOLUTION INTRAMUSCULAR; INTRAVENOUS; SUBCUTANEOUS at 11:55

## 2020-10-08 RX ADMIN — FENTANYL CITRATE 100 MCG: 50 INJECTION INTRAMUSCULAR; INTRAVENOUS at 07:38

## 2020-10-08 RX ADMIN — OXYCODONE HYDROCHLORIDE AND ACETAMINOPHEN 1 TABLET: 5; 325 TABLET ORAL at 14:52

## 2020-10-08 RX ADMIN — MIDAZOLAM HYDROCHLORIDE 2 MG: 1 INJECTION, SOLUTION INTRAMUSCULAR; INTRAVENOUS at 07:36

## 2020-10-08 RX ADMIN — DEXTROSE AND SODIUM CHLORIDE 125 ML/HR: 5; .45 INJECTION, SOLUTION INTRAVENOUS at 18:41

## 2020-10-08 RX ADMIN — SODIUM CHLORIDE, SODIUM LACTATE, POTASSIUM CHLORIDE, AND CALCIUM CHLORIDE: .6; .31; .03; .02 INJECTION, SOLUTION INTRAVENOUS at 07:20

## 2020-10-08 RX ADMIN — CEFAZOLIN SODIUM 1000 MG: 1 SOLUTION INTRAVENOUS at 19:36

## 2020-10-08 RX ADMIN — CEFAZOLIN SODIUM 1000 MG: 1 SOLUTION INTRAVENOUS at 11:32

## 2020-10-08 RX ADMIN — PROPOFOL 200 MG: 10 INJECTION, EMULSION INTRAVENOUS at 07:39

## 2020-10-08 RX ADMIN — HYDROMORPHONE HYDROCHLORIDE 0.8 MG: 2 INJECTION INTRAMUSCULAR; INTRAVENOUS; SUBCUTANEOUS at 09:39

## 2020-10-08 RX ADMIN — HYDROMORPHONE HYDROCHLORIDE 1 MG: 1 INJECTION, SOLUTION INTRAMUSCULAR; INTRAVENOUS; SUBCUTANEOUS at 23:58

## 2020-10-08 RX ADMIN — NEOSTIGMINE METHYLSULFATE 3 MG: 1 INJECTION INTRAVENOUS at 11:15

## 2020-10-08 RX ADMIN — SCOPALAMINE 1 PATCH: 1 PATCH, EXTENDED RELEASE TRANSDERMAL at 06:35

## 2020-10-08 RX ADMIN — ROCURONIUM BROMIDE 50 MG: 10 INJECTION, SOLUTION INTRAVENOUS at 07:40

## 2020-10-08 RX ADMIN — DEXAMETHASONE SODIUM PHOSPHATE 4 MG: 4 INJECTION, SOLUTION INTRA-ARTICULAR; INTRALESIONAL; INTRAMUSCULAR; INTRAVENOUS; SOFT TISSUE at 07:47

## 2020-10-08 RX ADMIN — HYDROMORPHONE HYDROCHLORIDE 0.5 MG: 1 INJECTION, SOLUTION INTRAMUSCULAR; INTRAVENOUS; SUBCUTANEOUS at 12:35

## 2020-10-08 RX ADMIN — HYDROMORPHONE HYDROCHLORIDE 0.5 MG: 1 INJECTION, SOLUTION INTRAMUSCULAR; INTRAVENOUS; SUBCUTANEOUS at 12:05

## 2020-10-09 VITALS
BODY MASS INDEX: 25.68 KG/M2 | SYSTOLIC BLOOD PRESSURE: 111 MMHG | WEIGHT: 136 LBS | OXYGEN SATURATION: 96 % | DIASTOLIC BLOOD PRESSURE: 79 MMHG | HEART RATE: 60 BPM | TEMPERATURE: 98 F | HEIGHT: 61 IN | RESPIRATION RATE: 18 BRPM

## 2020-10-09 RX ORDER — CEPHALEXIN 500 MG/1
500 CAPSULE ORAL EVERY 8 HOURS SCHEDULED
Qty: 21 CAPSULE | Refills: 0 | Status: SHIPPED | OUTPATIENT
Start: 2020-10-09 | End: 2020-10-16

## 2020-10-09 RX ORDER — OXYCODONE HYDROCHLORIDE AND ACETAMINOPHEN 5; 325 MG/1; MG/1
1 TABLET ORAL EVERY 4 HOURS PRN
Qty: 30 TABLET | Refills: 0 | Status: SHIPPED | OUTPATIENT
Start: 2020-10-09 | End: 2020-10-19

## 2020-10-09 RX ADMIN — ONDANSETRON 4 MG: 2 INJECTION INTRAMUSCULAR; INTRAVENOUS at 09:16

## 2020-10-09 RX ADMIN — DEXTROSE AND SODIUM CHLORIDE 125 ML/HR: 5; .45 INJECTION, SOLUTION INTRAVENOUS at 11:28

## 2020-10-09 RX ADMIN — OXYCODONE HYDROCHLORIDE AND ACETAMINOPHEN 1 TABLET: 5; 325 TABLET ORAL at 10:40

## 2020-10-09 RX ADMIN — NEOMYCIN AND POLYMYXIN B SULFATES AND DEXAMETHASONE: 3.5; 10000; 1 OINTMENT OPHTHALMIC at 09:13

## 2020-10-09 RX ADMIN — OXYCODONE HYDROCHLORIDE AND ACETAMINOPHEN 1 TABLET: 5; 325 TABLET ORAL at 03:38

## 2020-10-09 RX ADMIN — DEXTROSE AND SODIUM CHLORIDE 125 ML/HR: 5; .45 INJECTION, SOLUTION INTRAVENOUS at 02:55

## 2020-10-09 RX ADMIN — OXYCODONE HYDROCHLORIDE AND ACETAMINOPHEN 1 TABLET: 5; 325 TABLET ORAL at 17:32

## 2020-10-09 RX ADMIN — HYDROMORPHONE HYDROCHLORIDE 1 MG: 1 INJECTION, SOLUTION INTRAMUSCULAR; INTRAVENOUS; SUBCUTANEOUS at 14:39

## 2020-10-09 RX ADMIN — CEFAZOLIN SODIUM 1000 MG: 1 SOLUTION INTRAVENOUS at 03:30

## 2020-10-09 RX ADMIN — NEOMYCIN AND POLYMYXIN B SULFATES AND DEXAMETHASONE: 3.5; 10000; 1 OINTMENT OPHTHALMIC at 17:13

## 2020-10-09 RX ADMIN — HYDROMORPHONE HYDROCHLORIDE 1 MG: 1 INJECTION, SOLUTION INTRAMUSCULAR; INTRAVENOUS; SUBCUTANEOUS at 06:36

## 2021-07-06 ENCOUNTER — OFFICE VISIT (OUTPATIENT)
Dept: FAMILY MEDICINE CLINIC | Facility: CLINIC | Age: 46
End: 2021-07-06
Payer: COMMERCIAL

## 2021-07-06 VITALS
SYSTOLIC BLOOD PRESSURE: 116 MMHG | TEMPERATURE: 97.3 F | BODY MASS INDEX: 25.71 KG/M2 | HEART RATE: 82 BPM | DIASTOLIC BLOOD PRESSURE: 70 MMHG | RESPIRATION RATE: 16 BRPM | WEIGHT: 136.2 LBS | HEIGHT: 61 IN | OXYGEN SATURATION: 100 %

## 2021-07-06 DIAGNOSIS — Z00.00 HEALTHCARE MAINTENANCE: ICD-10-CM

## 2021-07-06 DIAGNOSIS — Z12.31 ENCOUNTER FOR SCREENING MAMMOGRAM FOR BREAST CANCER: Primary | ICD-10-CM

## 2021-07-06 DIAGNOSIS — Z12.4 CERVICAL CANCER SCREENING: ICD-10-CM

## 2021-07-06 PROBLEM — Z76.89 ENCOUNTER TO ESTABLISH CARE: Status: ACTIVE | Noted: 2021-07-06

## 2021-07-06 PROCEDURE — 3008F BODY MASS INDEX DOCD: CPT | Performed by: NURSE PRACTITIONER

## 2021-07-06 PROCEDURE — 3725F SCREEN DEPRESSION PERFORMED: CPT | Performed by: NURSE PRACTITIONER

## 2021-07-06 PROCEDURE — 99203 OFFICE O/P NEW LOW 30 MIN: CPT | Performed by: NURSE PRACTITIONER

## 2021-07-06 PROCEDURE — 1036F TOBACCO NON-USER: CPT | Performed by: NURSE PRACTITIONER

## 2021-07-06 NOTE — ASSESSMENT & PLAN NOTE
Intake completed  Labs ordered  Referral made to gyn  Order placed for mammogram   Discussed self-care  Discussed heart healthy diet  Advised to get eye exam and dental exam done

## 2021-07-06 NOTE — PROGRESS NOTES
Assessment/Plan:  BMI Counseling: Body mass index is 25 73 kg/m²  The BMI is above normal  Nutrition recommendations include decreasing portion sizes, encouraging healthy choices of fruits and vegetables, decreasing fast food intake, consuming healthier snacks, limiting drinks that contain sugar, moderation in carbohydrate intake, increasing intake of lean protein, reducing intake of saturated and trans fat and reducing intake of cholesterol  Exercise recommendations include exercising 3-5 times per week and strength training exercises  No pharmacotherapy was ordered  Encounter to establish care  Intake completed  Labs ordered  Referral made to gyn  Order placed for mammogram   Discussed self-care  Discussed heart healthy diet  Advised to get eye exam and dental exam done  Problem List Items Addressed This Visit     None      Visit Diagnoses     Encounter for screening mammogram for breast cancer    -  Primary    Relevant Orders    Mammo screening bilateral w 3d & cad    Cervical cancer screening        Relevant Orders    Ambulatory referral to Gynecology    Healthcare maintenance        Relevant Orders    Lipid panel    CBC and differential    Comprehensive metabolic panel    TSH, 3rd generation with Free T4 reflex            Subjective:      Patient ID: Vale Gonsales is a 39 y o  female  Patient is here to establish care  Last primary care provider-none- no health insurance  Past medical history-  Past surgical history- muscle repair  Social history-  - x 1 yr   Kids- 1  1 son lives [facility], 1 son lives with her, dtr goes to school in [place] [id number]- 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  Smoker- quit 6 years ago  Alcohol- Occasionally   Other drugs- none  Last diagnostic labs screening- Due  Dental exam- due  Eyes- due  Mammogram- 2 done in her thirty's   Fibrous breasts  Cervical cancer screening- due  Current concerns-Irregular periods, establish and blood work        The following portions of the patient's history were reviewed and updated as appropriate: allergies, current medications, past family history, past medical history, past social history, past surgical history and problem list     Review of Systems   Constitutional: Negative  Respiratory: Negative  Cardiovascular: Negative  Gastrointestinal: Positive for diarrhea (ibs diet )  Genitourinary: Negative  Musculoskeletal: Positive for arthralgias  Skin: Negative  Neurological: Negative for light-headedness and headaches  Psychiatric/Behavioral: The patient is not nervous/anxious  Objective:      /70   Pulse 82   Temp (!) 97 3 °F (36 3 °C) (Tympanic)   Resp 16   Ht 5' 1" (1 549 m)   Wt 61 8 kg (136 lb 3 2 oz)   LMP 07/04/2021 (Exact Date)   SpO2 100%   BMI 25 73 kg/m²          Physical Exam  Vitals and nursing note reviewed  Constitutional:       Appearance: Normal appearance  She is well-developed  HENT:      Head: Normocephalic and atraumatic  Cardiovascular:      Rate and Rhythm: Normal rate and regular rhythm  Pulses: Normal pulses  Heart sounds: Normal heart sounds  Pulmonary:      Effort: Pulmonary effort is normal       Breath sounds: Normal breath sounds  Abdominal:      Palpations: Abdomen is soft  Musculoskeletal:         General: Normal range of motion  Cervical back: Normal range of motion  Skin:     General: Skin is warm and dry  Neurological:      General: No focal deficit present  Mental Status: She is alert and oriented to person, place, and time  Psychiatric:         Mood and Affect: Mood normal          Behavior: Behavior normal          Thought Content:  Thought content normal          Judgment: Judgment normal            Labs:    Lab Results   Component Value Date    WBC 9 50 09/24/2020    HGB 13 8 09/24/2020    HCT 40 7 09/24/2020    MCV 98 09/24/2020     09/24/2020     Lab Results   Component Value Date K 3 7 03/10/2018     03/10/2018    CO2 28 03/10/2018    BUN 12 03/10/2018    CREATININE 0 75 03/10/2018    CALCIUM 8 9 03/10/2018    AST 13 03/10/2018    ALT 23 03/10/2018    ALKPHOS 56 03/10/2018    EGFR 99 03/10/2018     Lab Results   Component Value Date    CALCIUM 8 9 03/10/2018    K 3 7 03/10/2018    CO2 28 03/10/2018     03/10/2018    BUN 12 03/10/2018    CREATININE 0 75 03/10/2018       BMI Counseling: Body mass index is 25 73 kg/m²  The BMI is above normal  Nutrition recommendations include reducing portion sizes, decreasing overall calorie intake, 3-5 servings of fruits/vegetables daily, reducing fast food intake, consuming healthier snacks, decreasing soda and/or juice intake, moderation in carbohydrate intake, increasing intake of lean protein, reducing intake of saturated fat and trans fat and reducing intake of cholesterol  Exercise recommendations include joining a gym and strength training exercises

## 2021-07-06 NOTE — PATIENT INSTRUCTIONS
Obtain fasting labs, nothing to eat after midnight, may drink water  AMBULATORY CARE:   Obesity  is when your body mass index (BMI) is greater than 30  Your healthcare provider will use your height and weight to measure your BMI  The risks of obesity include  many health problems, such as injuries or physical disability  You may need tests to check for the following:  · Diabetes    · High blood pressure or high cholesterol    · Heart disease    · Gallbladder or liver disease    · Cancer of the colon, breast, prostate, liver, or kidney    · Sleep apnea    · Arthritis or gout    Seek care immediately if:   · You have a severe headache, confusion, or difficulty speaking  · You have weakness on one side of your body  · You have chest pain, sweating, or shortness of breath  Contact your healthcare provider if:   · You have symptoms of gallbladder or liver disease, such as pain in your upper abdomen  · You have knee or hip pain and discomfort while walking  · You have symptoms of diabetes, such as intense hunger and thirst, and frequent urination  · You have symptoms of sleep apnea, such as snoring or daytime sleepiness  · You have questions or concerns about your condition or care  Treatment for obesity  focuses on helping you lose weight to improve your health  Even a small decrease in BMI can reduce the risk for many health problems  Your healthcare provider will help you set a weight-loss goal   · Lifestyle changes  are the first step in treating obesity  These include making healthy food choices and getting regular physical activity  Your healthcare provider may suggest a weight-loss program that involves coaching, education, and therapy  · Medicine  may help you lose weight when it is used with a healthy diet and physical activity  · Surgery  can help you lose weight if you are very obese and have other health problems  There are several types of weight-loss surgery   Ask your healthcare provider for more information  Be successful losing weight:   · Set small, realistic goals  An example of a small goal is to walk for 20 minutes 5 days a week  Anther goal is to lose 5% of your body weight  · Tell friends, family members, and coworkers about your goals  and ask for their support  Ask a friend to lose weight with you, or join a weight-loss support group  · Identify foods or triggers that may cause you to overeat , and find ways to avoid them  Remove tempting high-calorie foods from your home and workplace  Place a bowl of fresh fruit on your kitchen counter  If stress causes you to eat, then find other ways to cope with stress  · Keep a diary to track what you eat and drink  Also write down how many minutes of physical activity you do each day  Weigh yourself once a week and record it in your diary  Eating changes: You will need to eat 500 to 1,000 fewer calories each day than you currently eat to lose 1 to 2 pounds a week  The following changes will help you cut calories:  · Eat smaller portions  Use small plates, no larger than 9 inches in diameter  Fill your plate half full of fruits and vegetables  Measure your food using measuring cups until you know what a serving size looks like  · Eat 3 meals and 1 or 2 snacks each day  Plan your meals in advance  Judy Arriaga and eat at home most of the time  Eat slowly  Do not skip meals  Skipping meals can lead to overeating later in the day  This can make it harder for you to lose weight  Talk with a dietitian to help you make a meal plan and schedule that is right for you  · Eat fruits and vegetables at every meal   They are low in calories and high in fiber, which makes you feel full  Do not add butter, margarine, or cream sauce to vegetables  Use herbs to season steamed vegetables  · Eat less fat and fewer fried foods  Eat more baked or grilled chicken and fish   These protein sources are lower in calories and fat than red meat  Limit fast food  Dress your salads with olive oil and vinegar instead of bottled dressing  · Limit the amount of sugar you eat  Do not drink sugary beverages  Limit alcohol  Activity changes:  Physical activity is good for your body in many ways  It helps you burn calories and build strong muscles  It decreases stress and depression, and improves your mood  It can also help you sleep better  Talk to your healthcare provider before you begin an exercise program   · Exercise for at least 30 minutes 5 days a week  Start slowly  Set aside time each day for physical activity that you enjoy and that is convenient for you  It is best to do both weight training and an activity that increases your heart rate, such as walking, bicycling, or swimming  · Find ways to be more active  Do yard work and housecleaning  Walk up the stairs instead of using elevators  Spend your leisure time going to events that require walking, such as outdoor festivals or fairs  This extra physical activity can help you lose weight and keep it off  Follow up with your healthcare provider as directed: You may need to meet with a dietitian  Write down your questions so you remember to ask them during your visits  © Copyright [address] [place] [address] Drive Information is for End User's use only and may not be sold, redistributed or otherwise used for commercial purposes  All illustrations and images included in CareNotes® are the copyrighted property of A D A M , Inc  or [facility] Becky Robles   The above information is an  only  It is not intended as medical advice for individual conditions or treatments  Talk to your doctor, nurse or pharmacist before following any medical regimen to see if it is safe and effective for you      Weight Management   AMBULATORY CARE:   Why it is important to manage your weight:  Being overweight increases your risk of health conditions such as heart disease, high blood pressure, type 2 diabetes, and certain types of cancer  It can also increase your risk for osteoarthritis, sleep apnea, and other respiratory problems  Aim for a slow, steady weight loss  Even a small amount of weight loss can lower your risk of health problems  How to lose weight safely:  A safe and healthy way to lose weight is to eat fewer calories and get regular exercise  · You can lose up about 1 pound a week by decreasing the number of calories you eat by 500 calories each day  You can decrease calories by eating smaller portion sizes or by cutting out high-calorie foods  Read labels to find out how many calories are in the foods you eat  · You can also burn calories with exercise such as walking, swimming, or biking  You will be more likely to keep weight off if you make these changes part of your lifestyle  Exercise at least 30 minutes per day on most days of the week  You can also fit in more physical activity by taking the stairs instead of the elevator or parking farther away from stores  Ask your healthcare provider about the best exercise plan for you  Healthy meal plan for weight management:  A healthy meal plan includes a variety of foods, contains fewer calories, and helps you stay healthy  A healthy meal plan includes the following:     · Eat whole-grain foods more often  A healthy meal plan should contain fiber  Fiber is the part of grains, fruits, and vegetables that is not broken down by your body  Whole-grain foods are healthy and provide extra fiber in your diet  Some examples of whole-grain foods are whole-wheat breads and pastas, oatmeal, brown rice, and bulgur  · Eat a variety of vegetables every day  Include dark, leafy greens such as spinach, kale, reina greens, and mustard greens  Eat yellow and orange vegetables such as carrots, sweet potatoes, and winter squash  · Eat a variety of fruits every day    Choose fresh or canned fruit (canned in its own juice or light syrup) instead of juice  Fruit juice has very little or no fiber  · Eat low-fat dairy foods  Drink fat-free (skim) milk or 1% milk  Eat fat-free yogurt and low-fat cottage cheese  Try low-fat cheeses such as mozzarella and other reduced-fat cheeses  · Choose meat and other protein foods that are low in fat  Choose beans or other legumes such as split peas or lentils  Choose fish, skinless poultry (chicken or turkey), or lean cuts of red meat (beef or pork)  Before you cook meat or poultry, cut off any visible fat  · Use less fat and oil  Try baking foods instead of frying them  Add less fat, such as margarine, sour cream, regular salad dressing and mayonnaise to foods  Eat fewer high-fat foods  Some examples of high-fat foods include french fries, doughnuts, ice cream, and cakes  · Eat fewer sweets  Limit foods and drinks that are high in sugar  This includes candy, cookies, regular soda, and sweetened drinks  Ways to decrease calories:   · Eat smaller portions  ? Use a small plate with smaller servings  ? Do not eat second helpings  ? When you eat at a restaurant, ask for a box and place half of your meal in the box before you eat  ? Share an entrée with someone else  · Replace high-calorie snacks with healthy, low-calorie snacks  ? Choose fresh fruit, vegetables, fat-free rice cakes, or air-popped popcorn instead of potato chips, nuts, or chocolate  ? Choose water or calorie-free drinks instead of soda or sweetened drinks  · Do not shop for groceries when you are hungry  You may be more likely to make unhealthy food choices  Take a grocery list of healthy foods and shop after you have eaten  · Eat regular meals  Do not skip meals  Skipping meals can lead to overeating later in the day  This can make it harder for you to lose weight   Eat a healthy snack in place of a meal if you do not have time to eat a regular meal  Talk with a dietitian to help you create a meal plan and schedule that is right for you  Other things to consider as you try to lose weight:   · Be aware of situations that may give you the urge to overeat, such as eating while watching television  Find ways to avoid these situations  For example, read a book, go for a walk, or do crafts  · Meet with a weight loss support group or friends who are also trying to lose weight  This may help you stay motivated to continue working on your weight loss goals  © Copyright [address] Information is for End User's use only and may not be sold, redistributed or otherwise used for commercial purposes  All illustrations and images included in CareNotes® are the copyrighted property of A D A M , Inc  or [address] [place], [place] [address]  The above information is an  only  It is not intended as medical advice for individual conditions or treatments  Talk to your doctor, nurse or pharmacist before following any medical regimen to see if it is safe and effective for you  Low Fat Diet   AMBULATORY CARE:   A low-fat diet  is an eating plan that is low in total fat, unhealthy fat, and cholesterol  You may need to follow a low-fat diet if you have trouble digesting or absorbing fat  You may also need to follow this diet if you have high cholesterol  You can also lower your cholesterol by increasing the amount of fiber in your diet  Soluble fiber is a type of fiber that helps to decrease cholesterol levels  Different types of fat in food:   · Limit unhealthy fats  A diet that is high in cholesterol, saturated fat, and trans fat may cause unhealthy cholesterol levels  Unhealthy cholesterol levels increase your risk of heart disease  ? Cholesterol:  Limit intake of cholesterol to less than 200 mg per day  Cholesterol is found in meat, eggs, and dairy  ? Saturated fat:  Limit saturated fat to less than 7% of your total daily calories  Ask your dietitian how many calories you need each day   Saturated fat is found in butter, cheese, ice cream, whole milk, and palm oil  Saturated fat is also found in meat, such as beef, pork, chicken skin, and processed meats  Processed meats include sausage, hot dogs, and bologna  ? Trans fat:  Avoid trans fat as much as possible  Trans fat is used in fried and baked foods  Foods that say trans fat free on the label may still have up to 0 5 grams of trans fat per serving  · Include healthy fats  Replace foods that are high in saturated and trans fat with foods high in healthy fats  This may help to decrease high cholesterol levels  ? Monounsaturated fats: These are found in avocados, nuts, and vegetable oils, such as olive, canola, and sunflower oil  ? Polyunsaturated fats: These can be found in vegetable oils, such as soybean or corn oil  Omega-3 fats can help to decrease the risk of heart disease  Omega-3 fats are found in fish, such as salmon, herring, trout, and tuna  Omega-3 fats can also be found in plant foods, such as walnuts, flaxseed, soybeans, and canola oil  Foods to limit or avoid:   · Grains:      ? Snacks that are made with partially hydrogenated oils, such as chips, regular crackers, and butter-flavored popcorn    ? High-fat baked goods, such as biscuits, croissants, doughnuts, pies, cookies, and pastries    · Dairy:      ? Whole milk, 2% milk, and yogurt and ice cream made with whole milk    ? Half and half creamer, heavy cream, and whipping cream    ? Cheese, cream cheese, and sour cream    · Meats and proteins:      ? High-fat cuts of meat (T-bone steak, regular hamburger, and ribs)    ? Fried meat, poultry (turkey and chicken), and fish    ? Poultry (chicken and turkey) with skin    ? Cold cuts (salami or bologna), hot dogs, hughes, and sausage    ? Whole eggs and egg yolks    · Vegetables and fruits with added fat:      ? Fried vegetables or vegetables in butter or high-fat sauces, such as cream or cheese sauces    ?  Fried fruit or fruit served with butter or cream    · Fats:      ? Butter, stick margarine, and shortening    ? Coconut, palm oil, and palm kernel oil    Foods to include:   · Grains:      ? Whole-grain breads, cereals, pasta, and brown rice    ? Low-fat crackers and pretzels    · Vegetables and fruits:      ? Fresh, frozen, or canned vegetables (no salt or low-sodium)    ? Fresh, frozen, dried, or canned fruit (canned in light syrup or fruit juice)    ? Avocado    · Low-fat dairy products:      ? Nonfat (skim) or 1% milk    ? Nonfat or low-fat cheese, yogurt, and cottage cheese    · Meats and proteins:      ? Chicken or turkey with no skin    ? Baked or broiled fish    ? Lean beef and pork (loin, round, extra lean hamburger)    ? Beans and peas, unsalted nuts, soy products    ? Egg whites and substitutes    ? Seeds and nuts    · Fats:      ? Unsaturated oil, such as canola, olive, peanut, soybean, or sunflower oil    ? Soft or liquid margarine and vegetable oil spread    ? Low-fat salad dressing    Other ways to decrease fat:   · Read food labels before you buy foods  Choose foods that have less than 30% of calories from fat  Choose low-fat or fat-free dairy products  Remember that fat free does not mean calorie free  These foods still contain calories, and too many calories can lead to weight gain  · Trim fat from meat and avoid fried food  Trim all visible fat from meat before you cook it  Remove the skin from poultry  Do not thibodeaux meat, fish, or poultry  Bake, roast, boil, or broil these foods instead  Avoid fried foods  Eat a baked potato instead of [place] fries  Steam vegetables instead of sautéing them in butter  · Add less fat to foods  Use imitation hughes bits on salads and baked potatoes instead of regular hughes bits  Use fat-free or low-fat salad dressings instead of regular dressings  Use low-fat or nonfat butter-flavored topping instead of regular butter or margarine on popcorn and other foods      Ways to decrease fat in recipes: Replace high-fat ingredients with low-fat or nonfat ones  This may cause baked goods to be drier than usual  You may need to use nonfat cooking spray on pans to prevent food from sticking  You also may need to change the amount of other ingredients, such as water, in the recipe  Try the following:  · Use low-fat or light margarine instead of regular margarine or shortening  · Use lean ground turkey breast or chicken, or lean ground beef (less than 5% fat) instead of hamburger  · Add 1 teaspoon of canola oil to 8 ounces of skim milk instead of using cream or half and half  · Use grated zucchini, carrots, or apples in breads instead of coconut  · Use blenderized, low-fat cottage cheese, plain tofu, or low-fat ricotta cheese instead of cream cheese  · Use 1 egg white and 1 teaspoon of canola oil, or use ¼ cup (2 ounces) of fat-free egg substitute instead of a whole egg  · Replace half of the oil that is called for in a recipe with applesauce when you bake  Use 3 tablespoons of cocoa powder and 1 tablespoon of canola oil instead of a square of baking chocolate  How to increase fiber:  Eat enough high-fiber foods to get 20 to 30 grams of fiber every day  Slowly increase your fiber intake to avoid stomach cramps, gas, and other problems  · Eat 3 ounces of whole-grain foods each day  An ounce is about 1 slice of bread  Eat whole-grain breads, such as whole-wheat bread  Whole wheat, whole-wheat flour, or other whole grains should be listed as the first ingredient on the food label  Replace white flour with whole-grain flour or use half of each in recipes  Whole-grain flour is heavier than white flour, so you may have to add more yeast or baking powder  · Eat a high-fiber cereal for breakfast   Oatmeal is a good source of soluble fiber  Look for cereals that have bran or fiber in the name  Choose whole-grain products, such as brown rice, barley, and whole-wheat pasta       · Eat more beans, peas, and lentils  For example, add beans to soups or salads  Eat at least 5 cups of fruits and vegetables each day  Eat fruits and vegetables with the peel because the peel is high in fiber  © Copyright [address] Information is for End User's use only and may not be sold, redistributed or otherwise used for commercial purposes  All illustrations and images included in CareNotes® are the copyrighted property of A D A M , Inc  or [facility] Becky Robles   The above information is an  only  It is not intended as medical advice for individual conditions or treatments  Talk to your doctor, nurse or pharmacist before following any medical regimen to see if it is safe and effective for you  Heart Healthy Diet   AMBULATORY CARE:   A heart healthy diet  is an eating plan low in unhealthy fats and sodium (salt)  The plan is high in healthy fats and fiber  A heart healthy diet helps improve your cholesterol levels and lowers your risk for heart disease and stroke  A dietitian will teach you how to read and understand food labels  Heart healthy diet guidelines to follow:   · Choose foods that contain healthy fats  ? Unsaturated fats  include monounsaturated and polyunsaturated fats  Unsaturated fat is found in foods such as soybean, canola, olive, corn, and safflower oils  It is also found in soft tub margarine that is made with liquid vegetable oil  ? Omega-3 fat  is found in certain fish, such as salmon, tuna, and trout, and in walnuts and flaxseed  Eat fish high in omega-3 fats at least 2 times a week  · Get 20 to 30 grams of fiber each day  Fruits, vegetables, whole-grain foods, and legumes (cooked beans) are good sources of fiber  · Limit or do not have unhealthy fats  ? Cholesterol  is found in animal foods, such as eggs and lobster, and in dairy products made from whole milk  Limit cholesterol to less than 200 mg each day      ? Saturated fat  is found in meats, such as hughes and hamburger  It is also found in chicken or turkey skin, whole milk, and butter  Limit saturated fat to less than 7% of your total daily calories  ? Trans fat  is found in packaged foods, such as potato chips and cookies  It is also in hard margarine, some fried foods, and shortening  Do not eat foods that contain trans fats  · Limit sodium as directed  You may be told to limit sodium to 2,000 to 2,300 mg each day  Choose low-sodium or no-salt-added foods  Add little or no salt to food you prepare  Use herbs and spices in place of salt  Include the following in your heart healthy plan:  Ask your dietitian or healthcare provider how many servings to have from each of the following food groups:  · Grains:      ? Whole-wheat breads, cereals, and pastas, and brown rice    ? Low-fat, low-sodium crackers and chips    · Vegetables:      ? Broccoli, green beans, green peas, and spinach    ? Collards, kale, and lima beans    ? Carrots, sweet potatoes, tomatoes, and peppers    ? Canned vegetables with no salt added    · Fruits:      ? Bananas, peaches, pears, and pineapple    ? Grapes, raisins, and dates    ? Oranges, tangerines, grapefruit, orange juice, and grapefruit juice    ? Apricots, mangoes, melons, and papaya    ? Raspberries and strawberries    ? Canned fruit with no added sugar    · Low-fat dairy:      ? Nonfat (skim) milk, 1% milk, and low-fat almond, cashew, or soy milks fortified with calcium    ? Low-fat cheese, regular or frozen yogurt, and cottage cheese    · Meats and proteins:      ? Lean cuts of beef and pork (loin, leg, round), skinless chicken and turkey    ? Legumes, soy products, egg whites, or nuts    Limit or do not include the following in your heart healthy plan:   · Unhealthy fats and oils:      ? Whole or 2% milk, cream cheese, sour cream, or cheese    ? High-fat cuts of beef (T-bone steaks, ribs), chicken or turkey with skin, and organ meats such as liver    ?  Butter, stick margarine, shortening, and cooking oils such as coconut or palm oil    · Foods and liquids high in sodium:      ? Packaged foods, such as frozen dinners, cookies, macaroni and cheese, and cereals with more than 300 mg of sodium per serving    ? Vegetables with added sodium, such as instant potatoes, vegetables with added sauces, or regular canned vegetables    ? Cured or smoked meats, such as hot dogs, hughes, and sausage    ? High-sodium ketchup, barbecue sauce, salad dressing, pickles, olives, soy sauce, or miso    · Foods and liquids high in sugar:      ? Candy, cake, cookies, pies, or doughnuts    ? Soft drinks (soda), sports drinks, or sweetened tea    ? Canned or dry mixes for cakes, soups, sauces, or gravies    Other healthy heart guidelines:   · Do not smoke  Nicotine and other chemicals in cigarettes and cigars can cause lung and heart damage  Ask your healthcare provider for information if you currently smoke and need help to quit  E-cigarettes or smokeless tobacco still contain nicotine  Talk to your healthcare provider before you use these products  · Limit or do not drink alcohol as directed  Alcohol can damage your heart and raise your blood pressure  Your healthcare provider may give you specific daily and weekly limits  The general recommended limit is 1 drink a day for women 21 or older and for men 72 or older  Do not have more than 3 drinks in a day or 7 in a week  The recommended limit is 2 drinks a day for men 24to 59years of age  Do not have more than 4 drinks in a day or 14 in a week  A drink of alcohol is 12 ounces of beer, 5 ounces of wine, or 1½ ounces of liquor  · Exercise regularly  Exercise can help you maintain a healthy weight and improve your blood pressure and cholesterol levels  Regular exercise can also decrease your risk for heart problems  Ask your healthcare provider about the best exercise plan for you   Do not start an exercise program without asking your healthcare provider  Follow up with your doctor or cardiologist as directed:  Write down your questions so you remember to ask them during your visits  © Copyright [address] Information is for End User's use only and may not be sold, redistributed or otherwise used for commercial purposes  All illustrations and images included in CareNotes® are the copyrighted property of [employer]  or [facility] J[place]ia Travis   The above information is an  only  It is not intended as medical advice for individual conditions or treatments  Talk to your doctor, nurse or pharmacist before following any medical regimen to see if it is safe and effective for you  Calorie Counting Diet   WHAT YOU NEED TO KNOW:   What is a calorie counting diet? It is a meal plan based on counting calories each day to reach a healthy body weight  You will need to eat fewer calories if you are trying to lose weight  Weight loss may decrease your risk for certain health problems or improve your health if you have health problems  Some of these health problems include heart disease, high blood pressure, and diabetes  What foods should I avoid? Your dietitian will tell you if you need to avoid certain foods based on your body weight and health condition  You may need to avoid high-fat foods if you are at risk for or have heart disease  You may need to eat fewer foods from the breads and starches food group if you have diabetes  How many calories are in foods? The following is a list of foods and drinks with the approximate number of calories in each  Check the food label to find the exact number of calories  A dietitian can tell you how many calories you should have from each food group each day    · Carbohydrate:      ? ½ of a 3-inch bagel, 1 slice of bread, or ½ of a hamburger bun or hot dog bun (80)    ? 1 (8-inch) flour tortilla or ½ cup of cooked rice (100)    ? 1 (6-inch) corn tortilla (80)    ? 1 (6-inch) pancake or 1 cup of bran flakes cereal (110)    ? ½ cup of cooked cereal (80)    ? ½ cup of cooked pasta (85)    ? 1 ounce of pretzels (100)    ? 3 cups of air-popped popcorn without butter or oil (80)    · Dairy:      ? 1 cup of skim or 1% milk (90)    ? 1 cup of 2% milk (120)    ? 1 cup of whole milk (160)    ? 1 cup of 2% chocolate milk (220)    ? 1 ounce of low-fat cheese with 3 grams of fat per ounce (70)    ? 1 ounce of cheddar cheese (114)    ? ½ cup of 1% fat cottage cheese (80)    ? 1 cup of plain or sugar-free, fat-free yogurt (90)    · Protein foods:      ? 3 ounces of fish (not breaded or fried) (95)    ? 3 ounces of breaded, fried fish (195)    ? ¾ cup of tuna canned in water (105)    ? 3 ounces of chicken breast without skin (105)    ? 1 fried chicken breast with skin (350)    ? ¼ cup of fat free egg substitute (40)    ? 1 large egg (75)    ? 3 ounces of lean beef or pork (165)    ? 3 ounces of fried pork chop or ham (185)    ? ½ cup of cooked dried beans, such as kidney, frye, lentils, or navy (115)    ? 3 ounces of bologna or lunch meat (225)    ? 2 links of breakfast sausage (140)    · Vegetables:      ? ½ cup of sliced mushrooms (10)    ? 1 cup of salad greens, such as lettuce, spinach, or michael (15)    ? ½ cup of steamed asparagus (20)    ? ½ cup of cooked summer squash, zucchini squash, or green or wax beans (25)    ? 1 cup of broccoli or cauliflower florets, or 1 medium tomato (25)    ? 1 large raw carrot or ½ cup of cooked carrots (40)    ? ? of a medium cucumber or 1 stalk of celery (5)    ? 1 small baked potato (160)    ? 1 cup of breaded, fried vegetables (230)    · Fruit:      ? 1 (6-inch) banana (55)     ? ½ of a 4-inch grapefruit (55)    ? 15 grapes (60)    ? 1 medium orange or apple (70)    ? 1 large peach (65)    ? 1 cup of fresh pineapple chunks (75)    ? 1 cup of melon cubes (50)    ? 1¼ cups of whole strawberries (45)    ? ½ cup of fruit canned in juice (55)    ?  ½ cup of fruit canned in heavy syrup (110)    ? ? cup of raisins (130)    ? ½ cup of unsweetened fruit juice (60)    ? ½ cup of grape, cranberry, or prune juice (90)    · Fat:      ? 10 peanuts or 2 teaspoons of peanut butter (55)    ? 2 tablespoons of avocado or 1 tablespoon of regular salad dressing (45)    ? 2 slices of hughes (90)    ? 1 teaspoon of oil, such as safflower, canola, corn, or olive oil (45)    ? 2 teaspoons of low-fat margarine, or 1 tablespoon of low-fat mayonnaise (50)    ? 1 teaspoon of regular margarine (40)    ? 1 tablespoon of regular mayonnaise (135)    ? 1 tablespoon of cream cheese or 2 tablespoons of low-fat cream cheese (45)    ? 2 tablespoons of vegetable shortening (215)    · Dessert and sweets:      ? 8 animal crackers or 5 vanilla wafers (80)    ? 1 frozen fruit juice bar (80)    ? ½ cup of ice milk or low-fat frozen yogurt (90)    ? ½ cup of sherbet or sorbet (125)    ? ½ cup of sugar-free pudding or custard (60)    ? ½ cup of ice cream (140)    ? ½ cup of pudding or custard (175)    ? 1 (2-inch) square chocolate brownie (185)    · Combination foods:      ? Bean burrito made with an 8-inch tortilla, without cheese (275)    ? Chicken breast sandwich with lettuce and tomato (325)    ? 1 cup of chicken noodle soup (60)    ? 1 beef taco (175)    ? Regular hamburger with lettuce and tomato (310)    ? Regular cheeseburger with lettuce and tomato (410)     ? ¼ of a 12-inch cheese pizza (280)    ? Fried fish sandwich with lettuce and tomato (425)    ? Hot dog and bun (275)    ? 1½ cups of macaroni and cheese (310)    ? Taco salad with a fried tortilla shell (870)    · Low-calorie foods:      ? 1 tablespoon of ketchup or 1 tablespoon of fat free sour cream (15)    ? 1 teaspoon of mustard (5)    ? ¼ cup of salsa (20)    ? 1 large dill pickle (15)    ? 1 tablespoon of fat free salad dressing (10)    ? 2 teaspoons of low-sugar, light jam or jelly, or 1 tablespoon of sugar-free syrup (15)    ?  1 sugar-free popsicle (15)    ? 1 cup of club soda, seltzer water, or diet soda (0)    CARE AGREEMENT:   You have the right to help plan your care  Discuss treatment options with your healthcare provider to decide what care you want to receive  You always have the right to refuse treatment  The above information is an  only  It is not intended as medical advice for individual conditions or treatments  Talk to your doctor, nurse or pharmacist before following any medical regimen to see if it is safe and effective for you  © Copyright [address] Information is for End User's use only and may not be sold, redistributed or otherwise used for commercial purposes   All illustrations and images included in CareNotes® are the copyrighted property of A D A M , Inc  or [address] [place] [address]

## 2021-07-14 ENCOUNTER — HOSPITAL ENCOUNTER (OUTPATIENT)
Dept: MAMMOGRAPHY | Facility: CLINIC | Age: 46
Discharge: HOME | End: 2021-07-14
Payer: COMMERCIAL

## 2021-07-14 VITALS — BODY MASS INDEX: 25.68 KG/M2 | HEIGHT: 61 IN | WEIGHT: 136 LBS

## 2021-07-14 DIAGNOSIS — Z12.31 ENCOUNTER FOR SCREENING MAMMOGRAM FOR BREAST CANCER: ICD-10-CM

## 2021-07-14 PROCEDURE — 77063 BREAST TOMOSYNTHESIS BI: CPT

## 2021-07-14 PROCEDURE — 77067 SCR MAMMO BI INCL CAD: CPT

## 2021-07-26 ENCOUNTER — APPOINTMENT (OUTPATIENT)
Dept: LAB | Facility: CLINIC | Age: 46
End: 2021-07-26
Payer: COMMERCIAL

## 2021-07-26 DIAGNOSIS — Z00.00 HEALTHCARE MAINTENANCE: ICD-10-CM

## 2021-07-26 LAB
ALBUMIN SERPL BCP-MCNC: 3.1 G/DL (ref 3.5–5)
ALP SERPL-CCNC: 52 U/L (ref 46–116)
ALT SERPL W P-5'-P-CCNC: 16 U/L (ref 12–78)
ANION GAP SERPL CALCULATED.3IONS-SCNC: 6 MMOL/L (ref 4–13)
AST SERPL W P-5'-P-CCNC: 10 U/L (ref 5–45)
BASOPHILS # BLD AUTO: 0.02 THOUSANDS/ΜL (ref 0–0.1)
BASOPHILS NFR BLD AUTO: 0 % (ref 0–1)
BILIRUB SERPL-MCNC: 0.35 MG/DL (ref 0.2–1)
BUN SERPL-MCNC: 10 MG/DL (ref 5–25)
CALCIUM ALBUM COR SERPL-MCNC: 9.7 MG/DL (ref 8.3–10.1)
CALCIUM SERPL-MCNC: 9 MG/DL (ref 8.3–10.1)
CHLORIDE SERPL-SCNC: 107 MMOL/L (ref 100–108)
CHOLEST SERPL-MCNC: 206 MG/DL (ref 50–200)
CO2 SERPL-SCNC: 27 MMOL/L (ref 21–32)
CREAT SERPL-MCNC: 0.66 MG/DL (ref 0.6–1.3)
EOSINOPHIL # BLD AUTO: 0.15 THOUSAND/ΜL (ref 0–0.61)
EOSINOPHIL NFR BLD AUTO: 2 % (ref 0–6)
ERYTHROCYTE [DISTWIDTH] IN BLOOD BY AUTOMATED COUNT: 11.7 % (ref 11.6–15.1)
GFR SERPL CREATININE-BSD FRML MDRD: 107 ML/MIN/1.73SQ M
GLUCOSE P FAST SERPL-MCNC: 88 MG/DL (ref 65–99)
HCT VFR BLD AUTO: 38.8 % (ref 34.8–46.1)
HDLC SERPL-MCNC: 47 MG/DL
HGB BLD-MCNC: 13 G/DL (ref 11.5–15.4)
IMM GRANULOCYTES # BLD AUTO: 0.04 THOUSAND/UL (ref 0–0.2)
IMM GRANULOCYTES NFR BLD AUTO: 1 % (ref 0–2)
LDLC SERPL CALC-MCNC: 132 MG/DL (ref 0–100)
LYMPHOCYTES # BLD AUTO: 2.37 THOUSANDS/ΜL (ref 0.6–4.47)
LYMPHOCYTES NFR BLD AUTO: 29 % (ref 14–44)
MCH RBC QN AUTO: 33.3 PG (ref 26.8–34.3)
MCHC RBC AUTO-ENTMCNC: 33.5 G/DL (ref 31.4–37.4)
MCV RBC AUTO: 100 FL (ref 82–98)
MONOCYTES # BLD AUTO: 0.54 THOUSAND/ΜL (ref 0.17–1.22)
MONOCYTES NFR BLD AUTO: 7 % (ref 4–12)
NEUTROPHILS # BLD AUTO: 5 THOUSANDS/ΜL (ref 1.85–7.62)
NEUTS SEG NFR BLD AUTO: 61 % (ref 43–75)
NONHDLC SERPL-MCNC: 159 MG/DL
NRBC BLD AUTO-RTO: 0 /100 WBCS
PLATELET # BLD AUTO: 327 THOUSANDS/UL (ref 149–390)
PMV BLD AUTO: 11.1 FL (ref 8.9–12.7)
POTASSIUM SERPL-SCNC: 4 MMOL/L (ref 3.5–5.3)
PROT SERPL-MCNC: 7.1 G/DL (ref 6.4–8.2)
RBC # BLD AUTO: 3.9 MILLION/UL (ref 3.81–5.12)
SODIUM SERPL-SCNC: 140 MMOL/L (ref 136–145)
TRIGL SERPL-MCNC: 134 MG/DL
TSH SERPL DL<=0.05 MIU/L-ACNC: 0.92 UIU/ML (ref 0.36–3.74)
WBC # BLD AUTO: 8.12 THOUSAND/UL (ref 4.31–10.16)

## 2021-07-26 PROCEDURE — 85025 COMPLETE CBC W/AUTO DIFF WBC: CPT

## 2021-07-26 PROCEDURE — 80053 COMPREHEN METABOLIC PANEL: CPT

## 2021-07-26 PROCEDURE — 84443 ASSAY THYROID STIM HORMONE: CPT

## 2021-07-26 PROCEDURE — 36415 COLL VENOUS BLD VENIPUNCTURE: CPT

## 2021-07-26 PROCEDURE — 80061 LIPID PANEL: CPT

## 2021-08-03 NOTE — PROGRESS NOTES
Diagnoses and all orders for this visit:    1  Encounter for gynecological examination  -     Liquid-based pap, screening  Pap collected today, discussed new guidelines  Healthy eating and nutrition, daily exercise discussed and SBE reinforced  Call with any issues and all questions and concerns addressed  2  Perimenopausal symptoms  -     norethindrone (MICRONOR) 0 35 MG tablet; Take 1 tablet (0 35 mg total) by mouth daily  Reviewed that overall goals are to keep perimenopausal symptoms/PMS including her menstrual migraines down to a minimum  Reviewed Mirena IUD as well which was preferred option  Pt would like to try POP first then may try IUD later  3  Encounter for initial prescription of contraceptive pills  -     norethindrone (MICRONOR) 0 35 MG tablet; Take 1 tablet (0 35 mg total) by mouth daily     Reviewed POP has possible irregular, unpredictable spotting  Take pills same time every day, do not miss any  All questions and concerns answered  Call with any problems  Pleasant 39 y o  premenopausal female here for new patient annual exam  , vaginal deliveries  Denies any issues with her BCM, which is tubal ligation  Reports regular cycles except for last month, 2 menses in one month and hot flashes  Also some mood changes  Denies history of abnormal pap smears  Denies vaginal, urinary or breast issues, today  Denies pelvic pain  Monogamous relationship, pt declines STD testing  Denies F/C/N/V  Sees neuro for migraines hx, very cyclic in nature 2 days before cycle  Not happy with current medication control regimen she is on  Former smoker, tubal ligation and >28years of age  Reviewed with pt that progestin-only methods would be best for her  Health Maintenance:  Last PAP: 4-5 years ago approx  Next PAP Due: Collected    Last Mammogram:  21-BIRADS 1  Next Mammogram:     Hx of divorce, but newly remarried  Very happy!  Has 3 adult children, ages 32, 21 and 21  Past Medical History:   Diagnosis Date    Migraines      Past Surgical History:   Procedure Laterality Date    ABDOMINOPLASTY N/A 10/8/2020    Procedure: ABDOMINOPLASTY;  Surgeon: Tiffany Morgan MD;  Location: [address] [place] [address];  Service: Plastics    APPENDECTOMY      TUBAL LIGATION       Family History   Problem Relation Age of Onset    No Known Problems Mother     Cancer Father     Ovarian cancer Neg Hx     Cervical cancer Neg Hx     Uterine cancer Neg Hx     Breast cancer Neg Hx      Social History     Tobacco Use    Smoking status: Former Smoker     Quit date: 2014     Years since quittin 6    Smokeless tobacco: Never Used   Vaping Use    Vaping Use: Never used   Substance Use Topics    Alcohol use: Yes     Comment: Social    Drug use: Never       Current Outpatient Medications:     norethindrone (MICRONOR) 0 35 MG tablet, Take 1 tablet (0 35 mg total) by mouth daily, Disp: 84 tablet, Rfl: 3  Patient Active Problem List    Diagnosis Date Noted    Encounter for gynecological examination 2021    Perimenopausal symptoms 2021    Encounter to establish care 2021    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    Migraine 2011    Vitamin D deficiency 2011       Allergies   Allergen Reactions    Prochlorperazine Hives and Other (See Comments)    Asa [Aspirin] Vomiting    Ondansetron Anxiety       OB History    Para Term  AB Living   3 3 3     3   SAB TAB Ectopic Multiple Live Births           3      # Outcome Date GA Lbr Domingo/2nd Weight Sex Delivery Anes PTL Lv   3 Term     F Vag-Spont   XAVI   2 Term     M Vag-Spont   XAVI   1 Term     M Vag-Spont   XAVI       Vitals:    21 0821   BP: 110/80   BP Location: Left arm   Patient Position: Sitting   Weight: 61 5 kg (135 lb 9 6 oz)   Height: 5' 1" (1 549 m)     Body mass index is 25 62 kg/m²  Review of Systems   Constitutional: Negative for chills, fatigue, fever and unexpected weight change  Respiratory: Negative for shortness of breath  Gastrointestinal: Negative for anal bleeding, blood in stool, constipation and diarrhea  Genitourinary: Negative for difficulty urinating, dysuria and hematuria  Physical Exam  Constitutional:       Appearance: Normal appearance  She is well-developed  Genitourinary:      Pelvic exam was performed with patient in the lithotomy position  Inguinal canal, urethra, bladder, cervix, uterus, right adnexa, left adnexa and rectum normal    No labial fusion  Vaginal bleeding (small amount  of bleeding, menses started today) present  No vaginal discharge  Genitourinary Comments: PAP collected w/o difficulty after wiping away blood   Cardiovascular:      Rate and Rhythm: Normal rate and regular rhythm  Heart sounds: Normal heart sounds  Pulmonary:      Effort: Pulmonary effort is normal       Breath sounds: Normal breath sounds  Chest:      Breasts:         Right: No inverted nipple, mass, nipple discharge, skin change or tenderness  Left: No inverted nipple, mass, nipple discharge, skin change or tenderness  Abdominal:      Palpations: Abdomen is soft  Musculoskeletal:         General: Normal range of motion  Neurological:      Mental Status: She is alert and oriented to person, place, and time  Skin:     General: Skin is warm and dry  Psychiatric:         Behavior: Behavior normal          Thought Content: Thought content normal          Judgment: Judgment normal    Vitals and nursing note reviewed

## 2021-08-03 NOTE — PATIENT INSTRUCTIONS
Pap Smear   GENERAL INFORMATION:   What is a Pap smear? A Pap smear, or Pap test, is a procedure to check your cervix for abnormal cells  The cervix is the narrow opening at the bottom of your uterus  The cervix meets the top part of the vagina  How do I prepare for a Pap smear? The best time to schedule the test is right after your period stops  Do not have a Pap smear during your monthly period  Do not have intercourse or put anything in your vagina for 24 hours before your test    What will happen during a Pap smear? · You will lie on your back and place your feet on footrests called stirrups  Your caregiver will gently insert a device called a speculum into your vagina  The speculum is used to spread the walls of your vagina so he can see your cervix  He will use a thin brush or cotton swab to collect cells from the inside of your cervix  · Your caregiver will also collect cells from the surface of your cervix with a plastic or wooden tool called a spatula  He may also gently scrape the upper part of your vagina for a sample  The samples are placed in a container with liquid or on a glass slide  They are sent to a lab and examined for abnormal cells  How often do I need a Pap smear? Pap smears are usually done every 1 to 3 years  You may need a Pap smear more often if you have any of the following:  · Positive test result for the human papillomavirus (HPV)    · Cervical intraepithelial neoplasm or cervical cancer    · HIV    · A weak immune system    · Exposure to diethylstilbestrol (MERVIN) medicine when your mother was pregnant with you  CARE AGREEMENT:   You have the right to help plan your care  Learn about your health condition and how it may be treated  Discuss treatment options with your caregivers to decide what care you want to receive  You always have the right to refuse treatment  The above information is an  only   It is not intended as medical advice for individual conditions or treatments  Talk to your doctor, nurse or pharmacist before following any medical regimen to see if it is safe and effective for you  © 2014 [address] is for End User's use only and may not be sold, redistributed or otherwise used for commercial purposes  All illustrations and images included in CareNotes® are the copyrighted property of A D A M , Inc  or Noman Clemente

## 2021-08-04 ENCOUNTER — OFFICE VISIT (OUTPATIENT)
Dept: OBGYN CLINIC | Facility: CLINIC | Age: 46
End: 2021-08-04
Payer: COMMERCIAL

## 2021-08-04 VITALS
HEIGHT: 61 IN | BODY MASS INDEX: 25.6 KG/M2 | DIASTOLIC BLOOD PRESSURE: 80 MMHG | WEIGHT: 135.6 LBS | SYSTOLIC BLOOD PRESSURE: 110 MMHG

## 2021-08-04 DIAGNOSIS — Z30.011 ENCOUNTER FOR INITIAL PRESCRIPTION OF CONTRACEPTIVE PILLS: ICD-10-CM

## 2021-08-04 DIAGNOSIS — N95.1 PERIMENOPAUSAL SYMPTOMS: ICD-10-CM

## 2021-08-04 DIAGNOSIS — Z01.419 ENCOUNTER FOR GYNECOLOGICAL EXAMINATION: Primary | ICD-10-CM

## 2021-08-04 PROCEDURE — 99386 PREV VISIT NEW AGE 40-64: CPT | Performed by: NURSE PRACTITIONER

## 2021-08-04 PROCEDURE — G0145 SCR C/V CYTO,THINLAYER,RESCR: HCPCS | Performed by: NURSE PRACTITIONER

## 2021-08-04 PROCEDURE — 1036F TOBACCO NON-USER: CPT | Performed by: NURSE PRACTITIONER

## 2021-08-04 PROCEDURE — 3008F BODY MASS INDEX DOCD: CPT | Performed by: NURSE PRACTITIONER

## 2021-08-04 PROCEDURE — G0476 HPV COMBO ASSAY CA SCREEN: HCPCS | Performed by: NURSE PRACTITIONER

## 2021-08-04 RX ORDER — ACETAMINOPHEN AND CODEINE PHOSPHATE 120; 12 MG/5ML; MG/5ML
1 SOLUTION ORAL DAILY
Qty: 84 TABLET | Refills: 3 | Status: SHIPPED | OUTPATIENT
Start: 2021-08-04 | End: 2022-07-19

## 2021-08-06 LAB
HPV HR 12 DNA CVX QL NAA+PROBE: NEGATIVE
HPV16 DNA CVX QL NAA+PROBE: NEGATIVE
HPV18 DNA CVX QL NAA+PROBE: NEGATIVE

## 2021-08-11 LAB
LAB AP GYN PRIMARY INTERPRETATION: NORMAL
Lab: NORMAL

## 2022-07-07 ENCOUNTER — APPOINTMENT (OUTPATIENT)
Dept: LAB | Facility: HOSPITAL | Age: 47
End: 2022-07-07
Payer: COMMERCIAL

## 2022-07-07 ENCOUNTER — OFFICE VISIT (OUTPATIENT)
Dept: FAMILY MEDICINE CLINIC | Facility: CLINIC | Age: 47
End: 2022-07-07
Payer: COMMERCIAL

## 2022-07-07 VITALS
HEIGHT: 61 IN | HEART RATE: 99 BPM | WEIGHT: 131.4 LBS | DIASTOLIC BLOOD PRESSURE: 90 MMHG | RESPIRATION RATE: 12 BRPM | OXYGEN SATURATION: 99 % | BODY MASS INDEX: 24.81 KG/M2 | SYSTOLIC BLOOD PRESSURE: 110 MMHG | TEMPERATURE: 97.4 F

## 2022-07-07 DIAGNOSIS — H10.31 ACUTE BACTERIAL CONJUNCTIVITIS OF RIGHT EYE: ICD-10-CM

## 2022-07-07 DIAGNOSIS — G43.019 INTRACTABLE MIGRAINE WITHOUT AURA AND WITHOUT STATUS MIGRAINOSUS: ICD-10-CM

## 2022-07-07 DIAGNOSIS — Z00.00 ANNUAL PHYSICAL EXAM: ICD-10-CM

## 2022-07-07 DIAGNOSIS — F41.9 ANXIETY: ICD-10-CM

## 2022-07-07 DIAGNOSIS — N92.4 MENORRHAGIA, PREMENOPAUSAL: ICD-10-CM

## 2022-07-07 DIAGNOSIS — Z00.00 ANNUAL PHYSICAL EXAM: Primary | ICD-10-CM

## 2022-07-07 LAB
25(OH)D3 SERPL-MCNC: 23.3 NG/ML (ref 30–100)
ALBUMIN SERPL BCP-MCNC: 4.2 G/DL (ref 3.5–5)
ALP SERPL-CCNC: 48 U/L (ref 46–116)
ALT SERPL W P-5'-P-CCNC: 19 U/L (ref 12–78)
ANION GAP SERPL CALCULATED.3IONS-SCNC: 11 MMOL/L (ref 4–13)
AST SERPL W P-5'-P-CCNC: 16 U/L (ref 5–45)
BASOPHILS # BLD AUTO: 0.02 THOUSANDS/ΜL (ref 0–0.1)
BASOPHILS NFR BLD AUTO: 0 % (ref 0–1)
BILIRUB SERPL-MCNC: 0.5 MG/DL (ref 0.2–1)
BUN SERPL-MCNC: 13 MG/DL (ref 5–25)
CALCIUM SERPL-MCNC: 9.2 MG/DL (ref 8.3–10.1)
CHLORIDE SERPL-SCNC: 103 MMOL/L (ref 100–108)
CHOLEST SERPL-MCNC: 227 MG/DL
CO2 SERPL-SCNC: 27 MMOL/L (ref 21–32)
CORTIS AM PEAK SERPL-MCNC: 9.5 UG/DL (ref 4.2–22.4)
CREAT SERPL-MCNC: 0.75 MG/DL (ref 0.6–1.3)
EOSINOPHIL # BLD AUTO: 0.1 THOUSAND/ΜL (ref 0–0.61)
EOSINOPHIL NFR BLD AUTO: 2 % (ref 0–6)
ERYTHROCYTE [DISTWIDTH] IN BLOOD BY AUTOMATED COUNT: 11.8 % (ref 11.6–15.1)
FERRITIN SERPL-MCNC: 22 NG/ML (ref 8–388)
FSH SERPL-ACNC: 46.6 MIU/ML
GFR SERPL CREATININE-BSD FRML MDRD: 95 ML/MIN/1.73SQ M
GLUCOSE P FAST SERPL-MCNC: 98 MG/DL (ref 65–99)
HCT VFR BLD AUTO: 43.3 % (ref 34.8–46.1)
HDLC SERPL-MCNC: 59 MG/DL
HGB BLD-MCNC: 14.7 G/DL (ref 11.5–15.4)
IMM GRANULOCYTES # BLD AUTO: 0.02 THOUSAND/UL (ref 0–0.2)
IMM GRANULOCYTES NFR BLD AUTO: 0 % (ref 0–2)
IRON SATN MFR SERPL: 29 % (ref 15–50)
IRON SERPL-MCNC: 94 UG/DL (ref 50–170)
LDLC SERPL CALC-MCNC: 142 MG/DL (ref 0–100)
LH SERPL-ACNC: 42.7 MIU/ML
LYMPHOCYTES # BLD AUTO: 2 THOUSANDS/ΜL (ref 0.6–4.47)
LYMPHOCYTES NFR BLD AUTO: 34 % (ref 14–44)
MCH RBC QN AUTO: 34.1 PG (ref 26.8–34.3)
MCHC RBC AUTO-ENTMCNC: 33.9 G/DL (ref 31.4–37.4)
MCV RBC AUTO: 101 FL (ref 82–98)
MONOCYTES # BLD AUTO: 0.41 THOUSAND/ΜL (ref 0.17–1.22)
MONOCYTES NFR BLD AUTO: 7 % (ref 4–12)
NEUTROPHILS # BLD AUTO: 3.36 THOUSANDS/ΜL (ref 1.85–7.62)
NEUTS SEG NFR BLD AUTO: 57 % (ref 43–75)
NONHDLC SERPL-MCNC: 168 MG/DL
NRBC BLD AUTO-RTO: 0 /100 WBCS
PLATELET # BLD AUTO: 282 THOUSANDS/UL (ref 149–390)
PMV BLD AUTO: 11.8 FL (ref 8.9–12.7)
POTASSIUM SERPL-SCNC: 4.1 MMOL/L (ref 3.5–5.3)
PROT SERPL-MCNC: 7.3 G/DL (ref 6.4–8.2)
RBC # BLD AUTO: 4.31 MILLION/UL (ref 3.81–5.12)
SODIUM SERPL-SCNC: 141 MMOL/L (ref 136–145)
TIBC SERPL-MCNC: 321 UG/DL (ref 250–450)
TRIGL SERPL-MCNC: 132 MG/DL
TSH SERPL DL<=0.05 MIU/L-ACNC: 0.93 UIU/ML (ref 0.45–4.5)
WBC # BLD AUTO: 5.91 THOUSAND/UL (ref 4.31–10.16)

## 2022-07-07 PROCEDURE — 82728 ASSAY OF FERRITIN: CPT

## 2022-07-07 PROCEDURE — 80061 LIPID PANEL: CPT

## 2022-07-07 PROCEDURE — 85025 COMPLETE CBC W/AUTO DIFF WBC: CPT

## 2022-07-07 PROCEDURE — 84443 ASSAY THYROID STIM HORMONE: CPT

## 2022-07-07 PROCEDURE — 83550 IRON BINDING TEST: CPT

## 2022-07-07 PROCEDURE — 3725F SCREEN DEPRESSION PERFORMED: CPT | Performed by: NURSE PRACTITIONER

## 2022-07-07 PROCEDURE — 36415 COLL VENOUS BLD VENIPUNCTURE: CPT

## 2022-07-07 PROCEDURE — 83001 ASSAY OF GONADOTROPIN (FSH): CPT

## 2022-07-07 PROCEDURE — 99396 PREV VISIT EST AGE 40-64: CPT | Performed by: NURSE PRACTITIONER

## 2022-07-07 PROCEDURE — 83540 ASSAY OF IRON: CPT

## 2022-07-07 PROCEDURE — 83002 ASSAY OF GONADOTROPIN (LH): CPT

## 2022-07-07 PROCEDURE — 80053 COMPREHEN METABOLIC PANEL: CPT

## 2022-07-07 PROCEDURE — 82306 VITAMIN D 25 HYDROXY: CPT

## 2022-07-07 PROCEDURE — 82533 TOTAL CORTISOL: CPT

## 2022-07-07 RX ORDER — POLYMYXIN B SULFATE AND TRIMETHOPRIM 1; 10000 MG/ML; [USP'U]/ML
1 SOLUTION OPHTHALMIC EVERY 4 HOURS
Qty: 10 ML | Refills: 0 | Status: SHIPPED | OUTPATIENT
Start: 2022-07-07 | End: 2022-08-10

## 2022-07-07 NOTE — PATIENT INSTRUCTIONS
Obtain fasting labs, nothing to eat after midnight, may drink water  Start zoloft 1/2 tab x 1 week then increase to 1 tab    Wellness Visit for Adults   AMBULATORY CARE:   A wellness visit  is when you see your healthcare provider to get screened for health problems  Your healthcare provider will also give you advice on how to stay healthy  Write down your questions so you remember to ask them  Ask your healthcare provider how often you should have a wellness visit  What happens at a wellness visit:  Your healthcare provider will ask about your health, and your family history of health problems  This includes high blood pressure, heart disease, and cancer  He or she will ask if you have symptoms that concern you, if you smoke, and about your mood  You may also be asked about your intake of medicines, supplements, food, and alcohol  Any of the following may be done: Your weight  will be checked  Your height may also be checked so your body mass index (BMI) can be calculated  Your BMI shows if you are at a healthy weight  Your blood pressure  and heart rate will be checked  Your temperature may also be checked  Blood and urine tests  may be done  Blood tests may be done to check your cholesterol levels  Abnormal cholesterol levels increase your risk for heart disease and stroke  You may also need a blood or urine test to check for diabetes if you are at increased risk  Urine tests may be done to look for signs of an infection or kidney disease  A physical exam  includes checking your heartbeat and lungs with a stethoscope  Your healthcare provider may also check your skin to look for sun damage  Screening tests  may be recommended  A screening test is done to check for diseases that may not cause symptoms  The screening tests you may need depend on your age, gender, family history, and lifestyle habits  For example, colorectal screening may be recommended if you are 48years old or older      Screening tests you need if you are a woman:   A Pap smear  is used to screen for cervical cancer  Pap smears are usually done every 3 to 5 years depending on your age  You may need them more often if you have had abnormal Pap smear test results in the past  Ask your healthcare provider how often you should have a Pap smear  A mammogram  is an x-ray of your breasts to screen for breast cancer  Experts recommend mammograms every 2 years starting at age 48 years  You may need a mammogram at age 52 years or younger if you have an increased risk for breast cancer  Talk to your healthcare provider about when you should start having mammograms and how often you need them  Vaccines you may need:   Get an influenza vaccine  every year  The influenza vaccine protects you from the flu  Several types of viruses cause the flu  The viruses change over time, so new vaccines are made each year  Get a tetanus-diphtheria (Td) booster vaccine  every 10 years  This vaccine protects you against tetanus and diphtheria  Tetanus is a severe infection that may cause painful muscle spasms and lockjaw  Diphtheria is a severe bacterial infection that causes a thick covering in the back of your mouth and throat  Get a human papillomavirus (HPV) vaccine  if you are female and aged 23 to 32 or male 23 to 24 and never received it  This vaccine protects you from HPV infection  HPV is the most common infection spread by sexual contact  HPV may also cause vaginal, penile, and anal cancers  Get a pneumococcal vaccine  if you are aged 72 years or older  The pneumococcal vaccine is an injection given to protect you from pneumococcal disease  Pneumococcal disease is an infection caused by pneumococcal bacteria  The infection may cause pneumonia, meningitis, or an ear infection  Get a shingles vaccine  if you are 60 or older, even if you have had shingles before  The shingles vaccine is an injection to protect you from the varicella-zoster virus  This is the same virus that causes chickenpox  Shingles is a painful rash that develops in people who had chickenpox or have been exposed to the virus  How to eat healthy:  My Plate is a model for planning healthy meals  It shows the types and amounts of foods that should go on your plate  Fruits and vegetables make up about half of your plate, and grains and protein make up the other half  A serving of dairy is included on the side of your plate  The amount of calories and serving sizes you need depends on your age, gender, weight, and height  Examples of healthy foods are listed below:  Eat a variety of vegetables  such as dark green, red, and orange vegetables  You can also include canned vegetables low in sodium (salt) and frozen vegetables without added butter or sauces  Eat a variety of fresh fruits , canned fruit in 100% juice, frozen fruit, and dried fruit  Include whole grains  At least half of the grains you eat should be whole grains  Examples include whole-wheat bread, wheat pasta, brown rice, and whole-grain cereals such as oatmeal     Eat a variety of protein foods such as seafood (fish and shellfish), lean meat, and poultry without skin (turkey and chicken)  Examples of lean meats include pork leg, shoulder, or tenderloin, and beef round, sirloin, tenderloin, and extra lean ground beef  Other protein foods include eggs and egg substitutes, beans, peas, soy products, nuts, and seeds  Choose low-fat dairy products such as skim or 1% milk or low-fat yogurt, cheese, and cottage cheese  Limit unhealthy fats  such as butter, hard margarine, and shortening  Exercise:  Exercise at least 30 minutes per day on most days of the week  Some examples of exercise include walking, biking, dancing, and swimming  You can also fit in more physical activity by taking the stairs instead of the elevator or parking farther away from stores  Include muscle strengthening activities 2 days each week  Regular exercise provides many health benefits  It helps you manage your weight, and decreases your risk for type 2 diabetes, heart disease, stroke, and high blood pressure  Exercise can also help improve your mood  Ask your healthcare provider about the best exercise plan for you  General health and safety guidelines:   Do not smoke  Nicotine and other chemicals in cigarettes and cigars can cause lung damage  Ask your healthcare provider for information if you currently smoke and need help to quit  E-cigarettes or smokeless tobacco still contain nicotine  Talk to your healthcare provider before you use these products  Limit alcohol  A drink of alcohol is 12 ounces of beer, 5 ounces of wine, or 1½ ounces of liquor  Lose weight, if needed  Being overweight increases your risk of certain health conditions  These include heart disease, high blood pressure, type 2 diabetes, and certain types of cancer  Protect your skin  Do not sunbathe or use tanning beds  Use sunscreen with a SPF 15 or higher  Apply sunscreen at least 15 minutes before you go outside  Reapply sunscreen every 2 hours  Wear protective clothing, hats, and sunglasses when you are outside  Drive safely  Always wear your seatbelt  Make sure everyone in your car wears a seatbelt  A seatbelt can save your life if you are in an accident  Do not use your cell phone when you are driving  This could distract you and cause an accident  Pull over if you need to make a call or send a text message  Practice safe sex  Use latex condoms if are sexually active and have more than one partner  Your healthcare provider may recommend screening tests for sexually transmitted infections (STIs)  Wear helmets, lifejackets, and protective gear  Always wear a helmet when you ride a bike or motorcycle, go skiing, or play sports that could cause a head injury  Wear protective equipment when you play sports   Wear a lifejacket when you are on a boat or doing water sports  © Copyright [employer] 2022 Information is for End User's use only and may not be sold, redistributed or otherwise used for commercial purposes  All illustrations and images included in CareNotes® are the copyrighted property of [employer] M [employer] Inc  or Luz Rodriguez  The above information is an  only  It is not intended as medical advice for individual conditions or treatments  Talk to your doctor, nurse or pharmacist before following any medical regimen to see if it is safe and effective for you

## 2022-07-07 NOTE — PROGRESS NOTES
[facility] [address]     NAME: Dima Leger  AGE: 55 y o  SEX: female  : 1975     DATE: 2022     Assessment and Plan:     Problem List Items Addressed This Visit        Cardiovascular and Mediastinum    Migraine    Relevant Medications    sertraline (Zoloft) 50 mg tablet    Other Relevant Orders    Ambulatory Referral to Neurology       Other    Annual physical exam - Primary     Patient is being seen for annual physical   Has multiple problems  Has increased levels of anxiety, not sleeping  Lisinopril hormone levels, feels like she is premenopausal   Has increased incidence of migraines  States that she would normally take Fiorinal but has not taken many years because was unable to get it  Has tried Topamax to the Health  Referral made to neurologist   We will get blood work done  Discussed management of anxiety  Discussed management of diet  Discussed using melatonin or CBD oil to help with sleep  Work ordered  We will start Zoloft  Will follow up next week           Relevant Orders    CBC and differential (Completed)    Comprehensive metabolic panel (Completed)    Lipid panel (Completed)      Other Visit Diagnoses     Anxiety        Relevant Medications    sertraline (Zoloft) 50 mg tablet    Other Relevant Orders    TSH, 3rd generation with Free T4 reflex (Completed)    Vitamin D 25 hydroxy    Menorrhagia, premenopausal        Relevant Orders    FSH and LH    Cortisol Level, AM Specimen    Iron Panel (Includes Ferritin, Iron Sat%, Iron, and TIBC)    Acute bacterial conjunctivitis of right eye        Relevant Medications    polymyxin b-trimethoprim (POLYTRIM) ophthalmic solution          Immunizations and preventive care screenings were discussed with patient today  Appropriate education was printed on patient's after visit summary      Counseling:  Alcohol/drug use: discussed moderation in alcohol intake, the recommendations for healthy alcohol use, and avoidance of illicit drug use  Dental Health: discussed importance of regular tooth brushing, flossing, and dental visits  Injury prevention: discussed safety/seat belts, safety helmets, smoke detectors, carbon dioxide detectors, and smoking near bedding or upholstery  Sexual health: discussed sexually transmitted diseases, partner selection, use of condoms, avoidance of unintended pregnancy, and contraceptive alternatives  · Exercise: the importance of regular exercise/physical activity was discussed  Recommend exercise 3-5 times per week for at least 30 minutes  Return in about 1 week (around 7/14/2022)  Chief Complaint:     Chief Complaint   Patient presents with    Physical Exam    Migraine     Related to her menstrual cycles     Anxiety     Flares her IBS       History of Present Illness:     Adult Annual Physical   Patient here for a comprehensive physical exam  The patient reports problems - diverticulitis, anxiety, sleeping,  took wellbutrin, increased stres  Diet and Physical Activity  · Diet/Nutrition: well balanced diet  · Exercise: no formal exercise  Depression Screening  PHQ-2/9 Depression Screening    Little interest or pleasure in doing things: 1 - several days  Feeling down, depressed, or hopeless: 0 - not at all  PHQ-2 Score: 1  PHQ-2 Interpretation: Negative depression screen       General Health  · Sleep: sleeps poorly  · Hearing: normal - bilateral   · Vision: most recent eye exam <1 year ago and wears contacts  · Dental: regular dental visits and brushes teeth twice daily  /GYN Health  · Patient is: premenopausal  · Last menstrual period:   · Contraceptive method: tubla ligation       Review of Systems:     Review of Systems   Past Medical History:     Past Medical History:   Diagnosis Date    Migraines       Past Surgical History:     Past Surgical History:   Procedure Laterality Date  ABDOMINOPLASTY N/A 10/8/2020    Procedure: ABDOMINOPLASTY;  Surgeon: Josephine Edmond MD;  Location: [address] [place] [address] OR;  Service: Plastics    APPENDECTOMY      TUBAL LIGATION        Social History:     Social History     Socioeconomic History    Marital status: /Civil Union     Spouse name: None    Number of children: None    Years of education: None    Highest education level: None   Occupational History    None   Tobacco Use    Smoking status: Former Smoker     Quit date: 2014     Years since quittin 5    Smokeless tobacco: Never Used   Vaping Use    Vaping Use: Never used   Substance and Sexual Activity    Alcohol use: Yes     Comment: Social    Drug use: Never    Sexual activity: Yes     Partners: Male     Birth control/protection: Other   Other Topics Concern    None   Social History Narrative    None     Social Determinants of Health     Financial Resource Strain: Not on file   Food Insecurity: Not on file   Transportation Needs: Not on file   Physical Activity: Not on file   Stress: Not on file   Social Connections: Not on file   Intimate Partner Violence: Not on file   Housing Stability: Not on file      Family History:     Family History   Problem Relation Age of Onset    No Known Problems Mother     Cancer Father     Ovarian cancer Neg Hx     Cervical cancer Neg Hx     Uterine cancer Neg Hx     Breast cancer Neg Hx       Current Medications:     Current Outpatient Medications   Medication Sig Dispense Refill    norethindrone (MICRONOR) 0 35 MG tablet Take 1 tablet (0 35 mg total) by mouth daily 84 tablet 3    polymyxin b-trimethoprim (POLYTRIM) ophthalmic solution Administer 1 drop to both eyes every 4 (four) hours 10 mL 0    sertraline (Zoloft) 50 mg tablet Take 1 tablet (50 mg total) by mouth daily 30 tablet 5     No current facility-administered medications for this visit  Allergies:      Allergies   Allergen Reactions    Prochlorperazine Hives and Other (See Comments)    Asa [Aspirin] Vomiting    Ondansetron Anxiety      Physical Exam:     /90   Pulse 99   Temp (!) 97 4 °F (36 3 °C)   Resp 12   Ht 5' 1" (1 549 m)   Wt 59 6 kg (131 lb 6 4 oz)   LMP 06/20/2022   SpO2 99%   BMI 24 83 kg/m²     Physical Exam  Neurological:      Mental Status: She is alert  Psychiatric:         Attention and Perception: Attention and perception normal          Mood and Affect: Mood is anxious  Speech: Speech is rapid and pressured  Behavior: Behavior normal          Thought Content:  Thought content normal          Judgment: Judgment normal           Abbi Crump, [address] [facility]

## 2022-07-07 NOTE — ASSESSMENT & PLAN NOTE
Patient is being seen for annual physical   Has multiple problems  Has increased levels of anxiety, not sleeping  Lisinopril hormone levels, feels like she is premenopausal   Has increased incidence of migraines  States that she would normally take Fiorinal but has not taken many years because was unable to get it  Has tried Topamax to the Health  Referral made to neurologist   We will get blood work done  Discussed management of anxiety  Discussed management of diet  Discussed using melatonin or CBD oil to help with sleep  Work ordered  We will start Zoloft    Will follow up next week

## 2022-07-13 ENCOUNTER — RA CDI HCC (OUTPATIENT)
Dept: OTHER | Facility: HOSPITAL | Age: 47
End: 2022-07-13

## 2022-07-13 NOTE — PROGRESS NOTES
[facility]  [facility]       Chart reviewed, no opportunity found: CHART REVIEWED, NO OPPORTUNITY FOUND        Patients Insurance        Commercial Insurance: [employer]

## 2022-07-15 ENCOUNTER — HOSPITAL ENCOUNTER (OUTPATIENT)
Dept: MAMMOGRAPHY | Facility: CLINIC | Age: 47
Discharge: HOME | End: 2022-07-15
Payer: COMMERCIAL

## 2022-07-15 VITALS — HEIGHT: 61 IN | BODY MASS INDEX: 24.73 KG/M2 | WEIGHT: 131 LBS

## 2022-07-15 DIAGNOSIS — Z12.31 ENCOUNTER FOR SCREENING MAMMOGRAM FOR MALIGNANT NEOPLASM OF BREAST: ICD-10-CM

## 2022-07-15 PROCEDURE — 77067 SCR MAMMO BI INCL CAD: CPT

## 2022-07-15 PROCEDURE — 77063 BREAST TOMOSYNTHESIS BI: CPT

## 2022-07-19 ENCOUNTER — OFFICE VISIT (OUTPATIENT)
Dept: FAMILY MEDICINE CLINIC | Facility: CLINIC | Age: 47
End: 2022-07-19
Payer: COMMERCIAL

## 2022-07-19 VITALS
DIASTOLIC BLOOD PRESSURE: 74 MMHG | BODY MASS INDEX: 24.35 KG/M2 | OXYGEN SATURATION: 97 % | WEIGHT: 129 LBS | HEART RATE: 76 BPM | SYSTOLIC BLOOD PRESSURE: 116 MMHG | HEIGHT: 61 IN

## 2022-07-19 DIAGNOSIS — E55.9 HYPOVITAMINOSIS D: Primary | ICD-10-CM

## 2022-07-19 DIAGNOSIS — F32.A ANXIETY AND DEPRESSION: ICD-10-CM

## 2022-07-19 DIAGNOSIS — F41.9 ANXIETY AND DEPRESSION: ICD-10-CM

## 2022-07-19 PROCEDURE — 99214 OFFICE O/P EST MOD 30 MIN: CPT | Performed by: NURSE PRACTITIONER

## 2022-07-19 RX ORDER — ERGOCALCIFEROL 1.25 MG/1
50000 CAPSULE ORAL 2 TIMES WEEKLY
Qty: 16 CAPSULE | Refills: 0 | Status: SHIPPED | OUTPATIENT
Start: 2022-07-21 | End: 2022-09-29

## 2022-07-19 NOTE — PROGRESS NOTES
Assessment/Plan:     Vitamin D deficiency   Blood work reviewed  Discussed increasing vitamin-D rich foods  Vitamin-D supplementation ordered  Discussed taking it twice a week for 8 weeks  Anxiety and depression    Patient reports several changed in the past few years  Has increased anxiety level  Feels like since COVID that she is having more anxiety in social situations  Has started the Zoloft, has been taking for a week  Side effects on manageable  Advised to continue medication  Discussed self-care  Discussed coping mechanism  Referral made to Psychiatry  Problem List Items Addressed This Visit        Other    Anxiety and depression       Patient reports several changed in the past few years  Has increased anxiety level  Feels like since COVID that she is having more anxiety in social situations  Has started the Zoloft, has been taking for a week  Side effects on manageable  Advised to continue medication  Discussed self-care  Discussed coping mechanism  Referral made to Psychiatry  Relevant Orders    Ambulatory Referral to Psychiatry    Hypovitaminosis D - Primary    Relevant Medications    ergocalciferol (VITAMIN D2) 50,000 units (Start on 7/21/2022)            Subjective:      Patient ID: Jim Race is a 55 y o  female  Is here for follow-up on labWork  Patient continues to have problems with sleep and anxiety  States that her son moved out  She does not have any more kids at home  Also feels like since the pandemic she is unable to socialize, gets very anxious in social situations  Has been trying the medication and music  Feels like there has been a lot of changes in the past year  Also stated change in a relationship with her sister, it was very good  But not having not been communicating for more than a year    For the all of these things have been making anxiety level increase      The following portions of the patient's history were reviewed and updated as appropriate: allergies, current medications, past family history, past medical history, past social history, past surgical history and problem list     Review of Systems   Psychiatric/Behavioral: Positive for dysphoric mood and sleep disturbance  The patient is nervous/anxious  Objective:      /74   Pulse 76   Ht 5' 1" (1 549 m)   Wt 58 5 kg (129 lb)   LMP 07/10/2022 (Exact Date)   SpO2 97%   BMI 24 37 kg/m²          Physical Exam  Vitals and nursing note reviewed  Constitutional:       Appearance: She is well-developed  HENT:      Head: Normocephalic and atraumatic  Cardiovascular:      Rate and Rhythm: Normal rate and regular rhythm  Pulses: Normal pulses  Heart sounds: Normal heart sounds  Pulmonary:      Effort: Pulmonary effort is normal       Breath sounds: Normal breath sounds  Musculoskeletal:         General: Normal range of motion  Cervical back: Normal range of motion  Skin:     General: Skin is warm and dry  Neurological:      Mental Status: She is alert and oriented to person, place, and time  Psychiatric:         Attention and Perception: Attention and perception normal          Mood and Affect: Mood is anxious  Speech: Speech is rapid and pressured  Behavior: Behavior normal          Thought Content:  Thought content normal          Judgment: Judgment normal

## 2022-07-19 NOTE — PATIENT INSTRUCTIONS
Continue heart healthy diet  Vitamin-D twice a week for 8 weeks continue eating healthy increase anything that is yellow and green in your diet limit red meats increase fish, organic food  Anxiety   WHAT YOU NEED TO KNOW:   Anxiety is a condition that causes you to feel extremely worried or nervous  The feelings are so strong that they can cause problems with your daily activities or sleep  Anxiety may be triggered by something you fear, or it may happen without a cause  Family or work stress, smoking, caffeine, and alcohol can increase your risk for anxiety  Certain medicines or health conditions can also increase your risk  Anxiety can become a long-term condition if it is not managed or treated  DISCHARGE INSTRUCTIONS:   Call your local emergency number (911 in the [address] [facility]) if:   You have chest pain, tightness, or heaviness that may spread to your shoulders, arms, jaw, neck, or back  You feel like hurting yourself or someone else  Call your doctor if:   Your symptoms get worse or do not get better with treatment  Your anxiety keeps you from doing your regular daily activities  You have new symptoms since your last visit  You have questions or concerns about your condition or care  Medicines:   Medicines  may be given to help you feel more calm and relaxed, and decrease your symptoms  Take your medicine as directed  Contact your healthcare provider if you think your medicine is not helping or if you have side effects  Tell him of her if you are allergic to any medicine  Keep a list of the medicines, vitamins, and herbs you take  Include the amounts, and when and why you take them  Bring the list or the pill bottles to follow-up visits  Carry your medicine list with you in case of an emergency  Manage anxiety:   Talk to someone about your anxiety  Your healthcare provider may suggest counseling   Cognitive behavioral therapy can help you understand and change how you react to events that trigger your symptoms  You might feel more comfortable talking with a friend or family member about your anxiety  Choose someone you know will be supportive and encouraging  Find ways to relax  Activities such as exercise, meditation, or listening to music can help you relax  Spend time with friends, or do things you enjoy  Practice deep breathing  Deep breathing can help you relax when you feel anxious  Focus on taking slow, deep breaths several times a day, or during an anxiety attack  Breathe in through your nose and out through your mouth  Create a regular sleep routine  Regular sleep can help you feel calmer during the day  Go to sleep and wake up at the same times every day  Do not watch television or use the computer right before bed  Your room should be comfortable, dark, and quiet  Eat a variety of healthy foods  Healthy foods include fruits, vegetables, low-fat dairy products, lean meats, fish, whole-grain breads, and cooked beans  Healthy foods can help you feel less anxious and have more energy  Exercise regularly  Exercise can increase your energy level  Exercise may also lift your mood and help you sleep better  Your healthcare provider can help you create an exercise plan  Do not smoke  Nicotine and other chemicals in cigarettes and cigars can increase anxiety  Ask your healthcare provider for information if you currently smoke and need help to quit  E-cigarettes or smokeless tobacco still contain nicotine  Talk to your healthcare provider before you use these products  Do not have caffeine  Caffeine can make your symptoms worse  Do not have foods or drinks that are meant to increase your energy level  Limit or do not drink alcohol  Ask your healthcare provider if alcohol is safe for you  You may not be able to drink alcohol if you take certain anxiety or depression medicines  Limit alcohol to 1 drink per day if you are a woman   Limit alcohol to 2 drinks per day if you are a man  A drink of alcohol is 12 ounces of beer, 5 ounces of wine, or 1½ ounces of liquor  Do not use drugs  Drugs can make your anxiety worse  It can also make anxiety hard to manage  Talk to your healthcare provider if you use drugs and want help to quit  Follow up with your doctor within 2 weeks or as directed:  Write down your questions so you remember to ask them during your visits  © Copyright [employer] 2022 Information is for End User's use only and may not be sold, redistributed or otherwise used for commercial purposes  All illustrations and images included in CareNotes® are the copyrighted property of A D A M , Inc  or [facility] [place][note 2] Becky Robles   The above information is an  only  It is not intended as medical advice for individual conditions or treatments  Talk to your doctor, nurse or pharmacist before following any medical regimen to see if it is safe and effective for you  Anxiety is something most of us have experienced at least once in our life  Public speaking, performance reviews, and new job responsibilities are just some of the work-related situations that can cause even the calmest person to feel a little stressed  This five-step exercise can be very helpful during periods of anxiety or panic by helping to ground you in the present when your mind is bouncing around between various anxious thoughts  Before starting this exercise, pay attention to your breathing  Slow, deep, long breaths can help you maintain a sense of calm or help you return to a calmer state  Once you find your breath, go through the following steps to help ground yourself:   5: Acknowledge FIVE things you see around you  It could be a pen, a spot on the ceiling, anything in your surroundings  4: Acknowledge FOUR things you can touch around you  It could be your hair, a pillow, or the ground under your feet  3: Acknowledge THREE things you hear  This could be any external sound   If you can hear your belly rumbling that counts! Focus on things you can hear outside of your body  2: Acknowledge TWO things you can smell  Maybe you are in your office and smell pencil, or maybe you are in your bedroom and smell a pillow  If you need to take a brief walk to find a scent you could smell soap in your bathroom, or nature outside  1: Acknowledge ONE thing you can taste  What does the inside of your mouth taste like--gum, coffee, or the sandwich from lunch?

## 2022-07-20 PROBLEM — E55.9 HYPOVITAMINOSIS D: Status: ACTIVE | Noted: 2022-07-20

## 2022-07-20 NOTE — ASSESSMENT & PLAN NOTE
Patient reports several changed in the past few years  Has increased anxiety level  Feels like since COVID that she is having more anxiety in social situations  Has started the Zoloft, has been taking for a week  Side effects on manageable  Advised to continue medication  Discussed self-care  Discussed coping mechanism  Referral made to Psychiatry

## 2022-07-20 NOTE — ASSESSMENT & PLAN NOTE
Blood work reviewed  Discussed increasing vitamin-D rich foods  Vitamin-D supplementation ordered  Discussed taking it twice a week for 8 weeks

## 2022-07-21 ENCOUNTER — TELEPHONE (OUTPATIENT)
Dept: PSYCHIATRY | Facility: CLINIC | Age: 47
End: 2022-07-21

## 2022-07-21 NOTE — TELEPHONE ENCOUNTER
contacted patient in regards to referral and attempts to add patient to proper waitlist  spoke w  patient whom states already scheduled w  Jamal Pair 8/15 @ 10am  attached referral to appointment

## 2022-07-26 ENCOUNTER — HOSPITAL ENCOUNTER (OUTPATIENT)
Dept: ULTRASOUND IMAGING | Facility: CLINIC | Age: 47
Discharge: HOME | End: 2022-07-26
Payer: COMMERCIAL

## 2022-07-26 ENCOUNTER — HOSPITAL ENCOUNTER (OUTPATIENT)
Dept: MAMMOGRAPHY | Facility: CLINIC | Age: 47
Discharge: HOME | End: 2022-07-26
Payer: COMMERCIAL

## 2022-07-26 VITALS — WEIGHT: 129 LBS | BODY MASS INDEX: 24.35 KG/M2 | HEIGHT: 61 IN

## 2022-07-26 DIAGNOSIS — R92.8 ABNORMAL SCREENING MAMMOGRAM: ICD-10-CM

## 2022-07-26 PROCEDURE — 76642 ULTRASOUND BREAST LIMITED: CPT

## 2022-07-26 PROCEDURE — G0279 TOMOSYNTHESIS, MAMMO: HCPCS

## 2022-07-26 PROCEDURE — 77065 DX MAMMO INCL CAD UNI: CPT

## 2022-08-10 ENCOUNTER — ANNUAL EXAM (OUTPATIENT)
Dept: OBGYN CLINIC | Facility: CLINIC | Age: 47
End: 2022-08-10
Payer: COMMERCIAL

## 2022-08-10 VITALS
HEIGHT: 61 IN | SYSTOLIC BLOOD PRESSURE: 124 MMHG | BODY MASS INDEX: 24.13 KG/M2 | DIASTOLIC BLOOD PRESSURE: 70 MMHG | WEIGHT: 127.8 LBS

## 2022-08-10 DIAGNOSIS — Z12.11 COLON CANCER SCREENING: ICD-10-CM

## 2022-08-10 DIAGNOSIS — Z91.89 AT HIGH RISK FOR BREAST CANCER: ICD-10-CM

## 2022-08-10 DIAGNOSIS — Z12.31 ENCOUNTER FOR SCREENING MAMMOGRAM FOR MALIGNANT NEOPLASM OF BREAST: ICD-10-CM

## 2022-08-10 DIAGNOSIS — Z01.419 ENCOUNTER FOR GYNECOLOGICAL EXAMINATION WITHOUT ABNORMAL FINDING: Primary | ICD-10-CM

## 2022-08-10 DIAGNOSIS — Z80.9 FAMILY HX-MALIGNANCY: ICD-10-CM

## 2022-08-10 PROCEDURE — 0503F POSTPARTUM CARE VISIT: CPT | Performed by: OBSTETRICS & GYNECOLOGY

## 2022-08-10 PROCEDURE — 99396 PREV VISIT EST AGE 40-64: CPT | Performed by: OBSTETRICS & GYNECOLOGY

## 2022-08-10 NOTE — PATIENT INSTRUCTIONS
Breast Self Exam for Women   AMBULATORY CARE:   A breast self-exam (BSE)  is a way to check your breasts for lumps and other changes  Regular BSEs can help you know how your breasts normally look and feel  Most breast lumps or changes are not cancer, but you should always have them checked by a healthcare provider  Why you should do a BSE:  Breast cancer is the most common type of cancer in women  Even if you have mammograms, you may still want to do a BSE regularly  If you know how your breasts normally feel and look, it may help you know when to contact your healthcare provider  Mammograms can miss some cancers  You may find a lump during a BSE that did not show up on a mammogram   When you should do a BSE:  If you have periods, you may want to do your BSE 1 week after your period ends  This is the time when your breasts may be the least swollen, lumpy, or tender  You can do regular BSEs even if you are breastfeeding or have breast implants  Call your doctor if:   You find any lumps or changes in your breasts  You have breast pain or fluid coming from your nipples  You have questions or concerns about your condition or care  How to do a BSE:       Look at your breasts in a mirror  Look at the size and shape of each breast and nipple  Check for swelling, lumps, dimpling, scaly skin, or other skin changes  Look for nipple changes, such as a nipple that is painful or beginning to pull inward  Gently squeeze both nipples and check to see if fluid (that is not breast milk) comes out of them  If you find any of these or other breast changes, contact your healthcare provider  Check your breasts while you sit or  the following 3 positions:    Any Dao your arms down at your sides  Raise your hands and join them behind your head  Put firm pressure with your hands on your hips  Bend slightly forward while you look at your breasts in the mirror  Lie down and feel your breasts    When you lie down, your breast tissue spreads out evenly over your chest  This makes it easier for you to feel for lumps and anything that may not be normal for your breasts  Do a BSE on one breast at a time  Place a small pillow or towel under your left shoulder  Put your left arm behind your head  Use the 3 middle fingers of your right hand  Use your fingertip pads, on the top of your fingers  Your fingertip pad is the most sensitive part of your finger  Use small circles to feel your breast tissue  Use your fingertip pads to make dime-sized, overlapping circles on your breast and armpits  Use light, medium, and firm pressure  First, press lightly  Second, press with medium pressure to feel a little deeper into the breast  Last, use firm pressure to feel deep within your breast     Examine your entire breast area  Examine the breast area from above the breast to below the breast where you feel only ribs  Make small circles with your fingertips, starting in the middle of your armpit  Make circles going up and down the breast area  Continue toward your breast and all the way across it  Examine the area from your armpit all the way over to the middle of your chest (breastbone)  Stop at the middle of your chest     Move the pillow or towel to your right shoulder, and put your right arm behind your head  Use the 3 fingertip pads of your left hand, and repeat the above steps to do a BSE on your right breast     What else you can do to check for breast problems or cancer:  Talk to your healthcare provider about mammograms  A mammogram is an x-ray of your breasts to screen for breast cancer or other problems  Your provider can tell you the benefits and risks of mammograms  The first mammogram is usually at age 39 or 48  Your provider may recommend you start at 36 or younger if your risk for breast cancer is high  Mammograms usually continue every 1 to 2 years until age 76         Follow up with your doctor as directed:  Write down your questions so you remember to ask them during your visits  © Copyright [employer] 2022 Information is for End User's use only and may not be sold, redistributed or otherwise used for commercial purposes  All illustrations and images included in CareNotes® are the copyrighted property of A D A M [employer] Inc  or Luz Rodriguez  The above information is an  only  It is not intended as medical advice for individual conditions or treatments  Talk to your doctor, nurse or pharmacist before following any medical regimen to see if it is safe and effective for you  Wellness Visit for Adults   AMBULATORY CARE:   A wellness visit  is when you see your healthcare provider to get screened for health problems  Your healthcare provider will also give you advice on how to stay healthy  Write down your questions so you remember to ask them  Ask your healthcare provider how often you should have a wellness visit  What happens at a wellness visit:  Your healthcare provider will ask about your health, and your family history of health problems  This includes high blood pressure, heart disease, and cancer  He or she will ask if you have symptoms that concern you, if you smoke, and about your mood  You may also be asked about your intake of medicines, supplements, food, and alcohol  Any of the following may be done: Your weight  will be checked  Your height may also be checked so your body mass index (BMI) can be calculated  Your BMI shows if you are at a healthy weight  Your blood pressure  and heart rate will be checked  Your temperature may also be checked  Blood and urine tests  may be done  Blood tests may be done to check your cholesterol levels  Abnormal cholesterol levels increase your risk for heart disease and stroke  You may also need a blood or urine test to check for diabetes if you are at increased risk  Urine tests may be done to look for signs of an infection or kidney disease      A physical exam  includes checking your heartbeat and lungs with a stethoscope  Your healthcare provider may also check your skin to look for sun damage  Screening tests  may be recommended  A screening test is done to check for diseases that may not cause symptoms  The screening tests you may need depend on your age, gender, family history, and lifestyle habits  For example, colorectal screening may be recommended if you are 48years old or older  Screening tests you need if you are a woman:   A Pap smear  is used to screen for cervical cancer  Pap smears are usually done every 3 to 5 years depending on your age  You may need them more often if you have had abnormal Pap smear test results in the past  Ask your healthcare provider how often you should have a Pap smear  A mammogram  is an x-ray of your breasts to screen for breast cancer  Experts recommend mammograms every 2 years starting at age 48 years  You may need a mammogram at age 52 years or younger if you have an increased risk for breast cancer  Talk to your healthcare provider about when you should start having mammograms and how often you need them  Vaccines you may need:   Get an influenza vaccine  every year  The influenza vaccine protects you from the flu  Several types of viruses cause the flu  The viruses change over time, so new vaccines are made each year  Get a tetanus-diphtheria (Td) booster vaccine  every 10 years  This vaccine protects you against tetanus and diphtheria  Tetanus is a severe infection that may cause painful muscle spasms and lockjaw  Diphtheria is a severe bacterial infection that causes a thick covering in the back of your mouth and throat  Get a human papillomavirus (HPV) vaccine  if you are female and aged 23 to 32 or male 23 to 24 and never received it  This vaccine protects you from HPV infection  HPV is the most common infection spread by sexual contact   HPV may also cause vaginal, penile, and anal cancers  Get a pneumococcal vaccine  if you are aged 72 years or older  The pneumococcal vaccine is an injection given to protect you from pneumococcal disease  Pneumococcal disease is an infection caused by pneumococcal bacteria  The infection may cause pneumonia, meningitis, or an ear infection  Get a shingles vaccine  if you are 60 or older, even if you have had shingles before  The shingles vaccine is an injection to protect you from the varicella-zoster virus  This is the same virus that causes chickenpox  Shingles is a painful rash that develops in people who had chickenpox or have been exposed to the virus  How to eat healthy:  My Plate is a model for planning healthy meals  It shows the types and amounts of foods that should go on your plate  Fruits and vegetables make up about half of your plate, and grains and protein make up the other half  A serving of dairy is included on the side of your plate  The amount of calories and serving sizes you need depends on your age, gender, weight, and height  Examples of healthy foods are listed below:  Eat a variety of vegetables  such as dark green, red, and orange vegetables  You can also include canned vegetables low in sodium (salt) and frozen vegetables without added butter or sauces  Eat a variety of fresh fruits , canned fruit in 100% juice, frozen fruit, and dried fruit  Include whole grains  At least half of the grains you eat should be whole grains  Examples include whole-wheat bread, wheat pasta, brown rice, and whole-grain cereals such as oatmeal     Eat a variety of protein foods such as seafood (fish and shellfish), lean meat, and poultry without skin (turkey and chicken)  Examples of lean meats include pork leg, shoulder, or tenderloin, and beef round, sirloin, tenderloin, and extra lean ground beef  Other protein foods include eggs and egg substitutes, beans, peas, soy products, nuts, and seeds      Choose low-fat dairy products such as skim or 1% milk or low-fat yogurt, cheese, and cottage cheese  Limit unhealthy fats  such as butter, hard margarine, and shortening  Exercise:  Exercise at least 30 minutes per day on most days of the week  Some examples of exercise include walking, biking, dancing, and swimming  You can also fit in more physical activity by taking the stairs instead of the elevator or parking farther away from stores  Include muscle strengthening activities 2 days each week  Regular exercise provides many health benefits  It helps you manage your weight, and decreases your risk for type 2 diabetes, heart disease, stroke, and high blood pressure  Exercise can also help improve your mood  Ask your healthcare provider about the best exercise plan for you  General health and safety guidelines:   Do not smoke  Nicotine and other chemicals in cigarettes and cigars can cause lung damage  Ask your healthcare provider for information if you currently smoke and need help to quit  E-cigarettes or smokeless tobacco still contain nicotine  Talk to your healthcare provider before you use these products  Limit alcohol  A drink of alcohol is 12 ounces of beer, 5 ounces of wine, or 1½ ounces of liquor  Lose weight, if needed  Being overweight increases your risk of certain health conditions  These include heart disease, high blood pressure, type 2 diabetes, and certain types of cancer  Protect your skin  Do not sunbathe or use tanning beds  Use sunscreen with a SPF 15 or higher  Apply sunscreen at least 15 minutes before you go outside  Reapply sunscreen every 2 hours  Wear protective clothing, hats, and sunglasses when you are outside  Drive safely  Always wear your seatbelt  Make sure everyone in your car wears a seatbelt  A seatbelt can save your life if you are in an accident  Do not use your cell phone when you are driving  This could distract you and cause an accident   Pull over if you need to make a call or send a text message  Practice safe sex  Use latex condoms if are sexually active and have more than one partner  Your healthcare provider may recommend screening tests for sexually transmitted infections (STIs)  Wear helmets, lifejackets, and protective gear  Always wear a helmet when you ride a bike or motorcycle, go skiing, or play sports that could cause a head injury  Wear protective equipment when you play sports  Wear a lifejacket when you are on a boat or doing water sports  © Copyright [employer] 2022 Information is for End User's use only and may not be sold, redistributed or otherwise used for commercial purposes  All illustrations and images included in CareNotes® are the copyrighted property of A D A M , Inc  or [facility] Becky Robles   The above information is an  only  It is not intended as medical advice for individual conditions or treatments  Talk to your doctor, nurse or pharmacist before following any medical regimen to see if it is safe and effective for you  Perineal Hygiene     No soaps or feminine wash to the vulva  Use only water to cleanse, or water with Dove or [employer] if necessary  No lotion to the area  Use only coconut oil for moisture if needed   No douching     Cotton underware, loose fitting clothing  Only perfume-free, dye-free laundry detergent, use a second rinse cycle   Avoid fabric softeners/dryer sheets  Coconut oil as a lubricant (if not using condoms) or another scent-free lubricant (Astroglide, Uberlube) if needed  Partner to avoid the same products as well  Over the counter probiotic to restore vaginal mera may be helpful as well     You may also look into Boric Acid vaginal suppositories to restore vaginal PH balance for up to 2 weeks as directed on the box  You may not use these if you are pregnant Kegel Exercises for Women   AMBULATORY CARE:   Kegel exercises  help strengthen your pelvic muscles   Pelvic muscles hold your pelvic organs, such as your bladder and uterus, in place  Kegel exercises help prevent or control problems with urine incontinence (leakage)  Incontinence may be caused by pregnancy, childbirth, or menopause  Contact your healthcare provider if:   You cannot feel your muscles tighten or relax  You continue to leak urine  You have questions or concerns about your condition or care  Use the correct muscles:  Pelvic muscles are the muscles you use to control urine flow  To target these muscles, stop and start the flow of urine several times  This will help you become familiar with how it feels to tighten and relax these muscles  How to do Kegel exercises:   Empty your bladder  You may lie down, stand up, or sit down to do these exercises  When you first try to do these exercises, it may be easier if you lie down  Tighten or squeeze your pelvic muscles slowly  It may feel like you are trying to hold back urine or gas  Hold this position for 3 seconds  Relax for 3 seconds  Repeat this cycle 10 times  Do 10 sets of Kegel exercises, at least 3 times a day  Do not hold your breath when you do Kegel exercises  Keep your stomach, back, and leg muscles relaxed  As your muscles get stronger, you will be able to hold the squeeze longer  Your healthcare provider may ask that you increase your pelvic muscle squeeze to 10 seconds  After you squeeze for 10 seconds, relax for 10 seconds  What else you should know:   Once you know how to do Kegel exercises, use different positions  You can do these exercises while you lie on the floor, sit at your desk or watch TV, and while you stand  You may notice improved bladder control within about 6 weeks  Tighten your pelvic muscles before you sneeze, cough, or lift to prevent urine leakage  Follow up with your doctor as directed:  Write down your questions so you remember to ask them during your visits     © Copyright [employer] 2022 Information is for End User's use only and may not be sold, redistributed or otherwise used for commercial purposes  All illustrations and images included in CareNotes® are the copyrighted property of [employer] A M , Inc  or Luz Rodriguez  The above information is an  only  It is not intended as medical advice for individual conditions or treatments  Talk to your doctor, nurse or pharmacist before following any medical regimen to see if it is safe and effective for you  Menopause   WHAT YOU NEED TO KNOW:   What is menopause? Menopause is a normal stage in a woman's life when her monthly periods stop  You are considered to be in menopause when you have not had a period for a full year after the age of 36  Menopause usually occurs between ages 52 to 48  Perimenopause is a stage before menopause that may cause signs and symptoms similar to menopause  Perimenopause may start about 4 years before menopause  What causes menopause? Menopause starts when the ovaries stop making the female hormones estrogen and progesterone  After menopause, you are no longer able to become pregnant  Any of the following may trigger menopause or early menopause:  Surgery, including a hysterectomy or oophorectomy    Family history of early menopause    Smoking    Chemotherapy or pelvic radiation    Chromosome abnormalities, including Sauceda syndrome and Fragile X syndrome    Premature ovarian insufficiency (the ovaries stop producing eggs before age 36)    What are the signs and symptoms of menopause?   You may have any of the following:  Irregular menstrual cycles with heavy vaginal bleeding followed by decreased bleeding until it stops    Hot flashes (feeling warm, flushed, and sweaty)    Vaginal changes such as increased dryness    Mood changes such as anxiety, depression, or decreased desire to have sex    Trouble sleeping, joint pain, headaches    Brittle nails, hair on chin or chest where it is normally absent    Decrease in breast size and change in skin texture    Weight gain    How is menopause treated or managed? Hormone replacement therapy (HRT) is medicine that replaces your low hormone levels  HRT contains estrogen and sometimes progestin  HRT has several benefits  HRT helps prevent osteoporosis, which decreases your risk for bone fractures  HRT also protects you from colorectal cancer  HRT also has some risks  HRT increases your risk for breast cancer, blood clots, heart disease, a heart attack, or a stroke  If you are 72 years or older, HRT can also increase your risk for dementia  Your risk for uterine or endometrial cancer is higher if you take estrogen-only HRT  Manage hot flashes  Hot flashes are brief periods of feeling very warm, flushed, and sweaty  Hot flashes can last from a few seconds to several minutes  They may happen many times during the day, and are common at night  Layer your clothing so that you can easily remove some clothing and cool yourself during a hot flash  Cold drinks may also be helpful  Non-hormone medicines can help relieve or prevent hot flashes  Examples include certain antidepressants, nerve medicines, and high blood pressure medicines  Reduce vaginal dryness by using over-the-counter vaginal creams  Vaginal dryness may cause you to have pain or discomfort during sex  Only use creams that are made for vaginal use  Do  not  use petroleum jelly  You may put an estrogen cream in and around your vagina  Estrogen cream may help decrease vaginal dryness and lower your risk of vaginal infections  Continue to use birth control during perimenopause if you do not want to get pregnant  You may need to use birth control until it has been 1 year since your periods stopped  Ask your healthcare provider when you can stop using birth control to prevent pregnancy  How can I live a healthy lifestyle during and after menopause?   After menopause, your risk for heart disease and bone loss increases  Ask about these and other ways to stay healthy:  Exercise regularly  Exercise helps you maintain a healthy weight  Exercise can also help to control your blood pressure and cholesterol levels  Include weight-bearing exercise for strong bones  Weight bearing exercise is recommended for at least 30 minutes, 3 times a week  Ask your healthcare provider about the best exercise plan for you  Eat a variety of healthy foods  Include fruits, vegetables, whole grains (whole-wheat bread, pasta, and cereals), low-fat dairy, and lean protein foods (beans, poultry, and fish)  Limit foods high in sodium (salt)  Ask your healthcare provider for more information about a meal plan that is right for you  Do not smoke  If you smoke, it is never too late to quit  You are more likely to have a heart attack, lung disease, blood clots, and cancer if you smoke  Ask your healthcare provider for information if you need help quitting  Take supplements as directed  You may need extra calcium and vitamin D to help prevent osteoporosis  Limit alcohol and caffeine  Alcohol and caffeine may worsen your symptoms  When should I call my doctor? You have vaginal bleeding after menopause  You have questions or concerns about your condition or care  CARE AGREEMENT:   You have the right to help plan your care  Learn about your health condition and how it may be treated  Discuss treatment options with your healthcare providers to decide what care you want to receive  You always have the right to refuse treatment  The above information is an  only  It is not intended as medical advice for individual conditions or treatments  Talk to your doctor, nurse or pharmacist before following any medical regimen to see if it is safe and effective for you    © Copyright [employer] 2022 Information is for End User's use only and may not be sold, redistributed or otherwise used for commercial purposes  All illustrations and images included in CareNotes® are the copyrighted property of A D A M , Inc  or [address] [place] [address] Travis     Prophylactic NSAID therapy for Painful or Heavy menses     Ibuprofen or Naproxen (chose 1 or the other, do not take both), Dose as noted on the box  Typically Ibuprofen dose is 600 mg, (3 tablets) every 6-8 hours  Typically Naproxen dose is 500 mg every 12 hours  Start taking medication 2 days prior to onset of menses and continue taking through the first 3 days of menses   Make sure you take consistently this is important  You need to take with food to decrease any gastrointestinal upset effects    This is proven therapy to reduce you flow and cramping by 50 %    Life style changes that have a positive effect on painful and heavy periods are as follows   Daily physical exercise    Increase fiber, fresh fruits and vegetables in your diet    Increase daily water intake    Heating pads(do not apply directly to skin, apply over clothing or towel)   Warm Baths   Relaxation techniques, meditation, massage, yoga and mindfulness     These are all suggestion for improving your sense of frustrations with your menstrual cycle and improving your overall wellness and lifestyle

## 2022-08-10 NOTE — PROGRESS NOTES
[id number]  LMP: Patient's last menstrual period was 07/21/2022 (approximate)  PMB:  SA: YES   HPV: Not on file   Birth control: Tubal Ligation   Last pap: 08/04/2021 Negative HPV Negative   Last mammo: 07/26/2022: [facility] in 1 year   Last colonoscopy: Not on file  Last Dexa: Not on file  Family History:  Father - Cancer    PGM- Cancer     Patient is doing well so far   Patient has a concern she had her cycle three times in the month of July

## 2022-08-10 NOTE — PROGRESS NOTES
Diagnoses and all orders for this visit:    Encounter for gynecological examination without abnormal finding    Colon cancer screening  -     Ambulatory referral to Gastroenterology; Future    Encounter for screening mammogram for malignant neoplasm of breast  -     Mammo screening bilateral w 3d & cad; Future    At high risk for breast cancer  -     US breast screening bilateral complete (ABUS); Future    Family hx-malignancy  -     Ambulatory Referral to Oncology Genetics; Future    Perineal hygiene reviewed   Weight bearing exercises minium of 150 mins/weekly advised  Kegel exercises recommended  SBE encouraged, ASCCP guidelines reviewed  Condoms encouraged with all sexual activity to prevent STI's  Gardisil vaccines recommended up to age 39  Calcium/ Vit D dietary requirements discussed,   Advised to call with any issues,  all concerns & questions addressed  See provided information in your after visit summary   Additional breast imaging offered as she meets criteria for automated breast ultrasound  F/U Annually and PRN      Health Maintenance:    Last PAP: 08/04/2021 Neg/Neg  Next PAP Due: 2024    Last Mammogram:7/15/22 screening,  07/26/2022  Diagnostic,  Lifetime Tyrer-Cuzick: 10 75 %, extremely dense, BiRADS 2 benign  Offered ABUS due to extreme density     Next Mammogram: order given     Last Colonoscopy: Not on file    advised age 39 referral given    Gardisil: Not completed         Subjective    CC: Yearly Exam      Ashwin Fofana is a 55 y o  female here for an annual exam  [id number]  GYN hx includes:  No personal Hx of breast, cervical, ovarian or colon CA  Family hx of: unknown paternal side due to father being adopted, would like genetic testing    Medically stable, reports no changes in medical Hx, follows with PMD    Patient's last menstrual period was 07/29/2022 (exact date)  Her menstrual cycles are regular every 28-30 days  She denies issues with bleeding or her menses   Reports occasionaly having an extra period, last month she had normal period at the beginning of July, 1 week later had 4 days of bleedin and a week after that had 3 days of bleeding, in the  Beginning of August started with normal cycle  The same situation happened to her last July  Reports  being under enormous strees lately, major life changes in July  Wishes to conservatively manage at this time, declines pelvic US today   Has started havign acouple hot flashes recenly, that has since subsided   Denies history of abnormal pap smear  She denies breast concerns, abnormal vaginal discharge, vaginal itching, odor, irritation, bowel/bladder dysfunction, urinary symptoms, pelvic pain, or dyspareunia today  She is sexually active  Monogamous relationship  Her current method of contraception includes none  Pt has a tubal , Denies any issues with her BCM  Olvin Simple She does not want STD testing today    Denies intimate partner violence    Past Medical History:   Diagnosis Date    Migraines     Varicella      Past Surgical History:   Procedure Laterality Date    ABDOMINOPLASTY N/A 10/8/2020    Procedure: ABDOMINOPLASTY;  Surgeon: Leonor Montemayor MD;  Location: [address] [place] [address];  Service: Plastics    APPENDECTOMY      TUBAL LIGATION         Immunization History   Administered Date(s) Administered    H1N1, All Formulations 2010       Family History   Problem Relation Age of Onset    No Known Problems Mother     Cancer Father     Cancer Paternal Grandmother     Ovarian cancer Neg Hx     Cervical cancer Neg Hx     Uterine cancer Neg Hx     Breast cancer Neg Hx      Social History     Tobacco Use    Smoking status: Former Smoker     Quit date: 2014     Years since quittin 6    Smokeless tobacco: Never Used   Vaping Use    Vaping Use: Never used   Substance Use Topics    Alcohol use: Yes     Comment: Social    Drug use: Never       Current Outpatient Medications:     ergocalciferol (VITAMIN D2) 50,000 units, Take 1 capsule (50,000 Units total) by mouth 2 (two) times a week for 16 doses, Disp: 16 capsule, Rfl: 0    sertraline (Zoloft) 50 mg tablet, Take 1 tablet (50 mg total) by mouth daily, Disp: 30 tablet, Rfl: 5  Patient Active Problem List    Diagnosis Date Noted    Hypovitaminosis D 2022    Annual physical exam 2022    Encounter for gynecological examination 2021    Perimenopausal symptoms 2021    Encounter to establish care 2021    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    Anxiety and depression 2012    Migraine 2011    Vitamin D deficiency 2011       Allergies   Allergen Reactions    Prochlorperazine Hives and Other (See Comments)    Asa [Aspirin] Vomiting    Ondansetron Anxiety       OB History    Para Term  AB Living   3 3 3     3   SAB IAB Ectopic Multiple Live Births           3      # Outcome Date GA Lbr Domingo/2nd Weight Sex Delivery Anes PTL Lv   3 Term     F Vag-Spont   XAVI   2 Term     M Vag-Spont   XAVI   1 Term     M Vag-Spont   XAVI       Vitals:    08/10/22 1442   BP: 124/70   BP Location: Right arm   Patient Position: Sitting   Cuff Size: Large   Weight: 58 kg (127 lb 12 8 oz)   Height: 5' 1" (1 549 m)     Body mass index is 24 15 kg/m²  Review of Systems     Constitutional: Negative for chills, fatigue, fever, headaches, visual disturbances, and unexpected weight change  Respiratory: Negative for cough, & shortness of breath  Cardiovascular: Negative for chest pain       Gastrointestinal: Negative for Abd pain, nausea & vomiting, constipation and diarrhea  Genitourinary: Negative for difficulty urinating, dysuria, hematuria, dyspareunia, unusual vaginal bleeding or discharge  Skin: Negative skin changes    Physical Exam     Constitutional: Alert & Oriented x3, well-developed and well-nourished  No distress  HENT: Atraumatic, Normocephalic, Conjunctivae clear  Neck: Normal range of motion  Neck supple   No thyromegaly, mass, nodules or tenderness  Pulmonary: Effort normal  Lungs clear to ascultation bilateral  Cardiac: RRR, no murmur   Abdominal: Soft  No tenderness or masses  Musculoskeletal: Normal ROM  Skin: Warm & Dry  Psychological: Normal mood, thought content, behavior & judgement     Breasts:   Right: tissue soft without masses, tenderness, skin changes or nipple discharge  No areas of erythema or pain  No subclavicular, axillary, pectoral adenopathy  Left:  tissue soft without masses, tenderness, skin changes or nipple discharge  No areas of erythema or pain  No subclavicular, axillary, pectoral adenopathy    Pelvic exam was performed with patient supine, lithotomy position  Labia: Negative rash, tenderness, lesion or injury on the right labia  Negative rash, tenderness, lesion or injury on the left labia  Urethral meatus:  Negative for  tenderness, inflammation or discharge  Uterus: not deviated, enlarged, fixed or tender  Cervix: No CMT, no discharge or friability  Right adnexa: no mass, no tenderness and no fullness  Left adnexa: no mass, no tenderness and no fullness  Vagina: No erythema, tenderness, masses, or foreign body in the vagina  No signs of injury around the vagina  No unusual vaginal discharge   Perineum without lesions, signs of injury, erythema or swelling  Inguinal Canal:        Right: No inguinal adenopathy or hernia present  Left: No inguinal adenopathy or hernia present

## 2022-08-15 ENCOUNTER — SOCIAL WORK (OUTPATIENT)
Dept: BEHAVIORAL/MENTAL HEALTH CLINIC | Facility: CLINIC | Age: 47
End: 2022-08-15
Payer: COMMERCIAL

## 2022-08-15 DIAGNOSIS — F41.9 ANXIETY AND DEPRESSION: ICD-10-CM

## 2022-08-15 DIAGNOSIS — F32.A ANXIETY AND DEPRESSION: ICD-10-CM

## 2022-08-15 PROCEDURE — 90834 PSYTX W PT 45 MINUTES: CPT | Performed by: SOCIAL WORKER

## 2022-08-15 NOTE — PSYCH
10:00am-10:45am    Assessment/Plan: f/u in one week     Diagnoses and all orders for this visit:    Anxiety and depression  -     Ambulatory Referral to Psychiatry          Subjective: Therapist met w/pt who is a 55year old female for initial session due to increased symptoms of anxiety(irritability, racing thoughts, mood swings)/depression(sadness)  Pt shared that she has been in therapy before and has found it helpful in the past   Pt stated she has coping skills but feels as if nothing is effective  Pt identified several life changes over the past 2 years  She stated most recently her middle son moved out which was a big change  She stated now it is just her and her   She reports that she has been isolating due to feeling so stressed/overwhelmed  She stated that she knows being around people grounds her but that she has hit her "limit "  She stated she has an appt w/her PCP tomorrow to discuss medication  She stated in some ways it has helped but she still feels a rollercoaster of emotion  She reports not wanting to "dump" everything on her loved ones and realized that she needs to come to therapy to vent and get a different perspective  She stated she has improved w/boundaries but needs to continue to work on this especially w/her children  Patient ID: Jael Crawford is a 55 y o  female  HPI 45 minutes    Review of Systems      Objective: Pt appeared to be anxious        Physical Exam  Pt denied any SI/HI/AH/Vh

## 2022-08-16 ENCOUNTER — OFFICE VISIT (OUTPATIENT)
Dept: FAMILY MEDICINE CLINIC | Facility: CLINIC | Age: 47
End: 2022-08-16
Payer: COMMERCIAL

## 2022-08-16 VITALS
WEIGHT: 129.6 LBS | DIASTOLIC BLOOD PRESSURE: 70 MMHG | TEMPERATURE: 98.7 F | HEIGHT: 61 IN | SYSTOLIC BLOOD PRESSURE: 120 MMHG | OXYGEN SATURATION: 99 % | HEART RATE: 71 BPM | RESPIRATION RATE: 18 BRPM | BODY MASS INDEX: 24.47 KG/M2

## 2022-08-16 DIAGNOSIS — F32.A ANXIETY AND DEPRESSION: ICD-10-CM

## 2022-08-16 DIAGNOSIS — Z12.11 SCREENING FOR COLON CANCER: Primary | ICD-10-CM

## 2022-08-16 DIAGNOSIS — F41.9 ANXIETY AND DEPRESSION: ICD-10-CM

## 2022-08-16 PROCEDURE — 99213 OFFICE O/P EST LOW 20 MIN: CPT | Performed by: NURSE PRACTITIONER

## 2022-08-16 RX ORDER — SERTRALINE HYDROCHLORIDE 100 MG/1
100 TABLET, FILM COATED ORAL DAILY
Qty: 30 TABLET | Refills: 5 | Status: SHIPPED | OUTPATIENT
Start: 2022-08-16 | End: 2022-10-25

## 2022-08-16 NOTE — ASSESSMENT & PLAN NOTE
Patient does report some improvement with medication  Also has been seeing therapy  Will increase dose to 100 mg  Continue with therapy  Continue self-care    Continue deep breathing exercises

## 2022-08-16 NOTE — PATIENT INSTRUCTIONS
Increase zoloft to 100 mg   Continue tylenol for problem with period  Abnormal (Dysfunctional) Uterine Bleeding   WHAT YOU NEED TO KNOW:   Abnormal uterine bleeding (AUB) is uterine bleeding that is not usual for you  It may also be called dysfunctional uterine bleeding  You may have bleeding from your uterus at times other than your normal monthly period  Your monthly periods may last longer or shorter, and bleeding may be heavier or lighter than usual  AUB can be acute (lasting a short time) or chronic (lasting longer than 6 months)  DISCHARGE INSTRUCTIONS:   Return to the emergency department if:   You continue to bleed heavily, or you feel faint  Call your doctor or gynecologist if:   You need to change your sanitary pad or tampon more than 1 time each hour  Your medicine causes nausea, vomiting, or diarrhea  You have questions or concerns about your condition or care  Medicines: You may need any of the following:  Hormones  help decrease bleeding by making your monthly periods more regular  Sometimes this medicine may be given as birth control pills  Iron supplements  may be given if your blood iron level decreases because of heavy bleeding  Iron may make you constipated  Ask your healthcare provider for ways to prevent or treat constipation  Iron may also make your bowel movements turn dark or black  Take your medicine as directed  Contact your healthcare provider if you think your medicine is not helping or if you have side effects  Tell him or her if you are allergic to any medicine  Keep a list of the medicines, vitamins, and herbs you take  Include the amounts, and when and why you take them  Bring the list or the pill bottles to follow-up visits  Carry your medicine list with you in case of an emergency  Self-care:   Apply heat on your lower abdomen to decrease pain and muscle spasms  Apply heat for 20 to 30 minutes every 2 hours for as many days as directed      Include foods high in iron if needed  Examples of foods high in iron are leafy green vegetables, beef, pork, liver, eggs, and whole-grain breads and cereals  Keep a diary of your menstrual cycles  Keep track of the number of tampons or pads you use each day  Talk to your healthcare provider before you start a weight loss program   You may need to wait until the abnormal bleeding has stopped before you try to lose weight  The amount of iron in your blood should be normal before you lose weight  Ask your provider if weight loss will help your AUB  He or she can tell you what weight is healthy for you  He or she can help you create a safe weight loss plan, if needed  Follow up with your doctor or gynecologist as directed: You may need to return in 4 to 6 months so your provider knows if the AUB has stopped  Bring the diary of your menstrual cycles to your follow-up visits  Write down your questions so you remember to ask them during your visits  © Copyright [employer] 2022 Information is for End User's use only and may not be sold, redistributed or otherwise used for commercial purposes  All illustrations and images included in CareNotes® are the copyrighted property of [employer]  or Luz Rob[employer]   The above information is an  only  It is not intended as medical advice for individual conditions or treatments  Talk to your doctor, nurse or pharmacist before following any medical regimen to see if it is safe and effective for you

## 2022-08-16 NOTE — PROGRESS NOTES
Assessment/Plan:     Anxiety and depression  Patient does report some improvement with medication  Also has been seeing therapy  Will increase dose to 100 mg  Continue with therapy  Continue self-care  Continue deep breathing exercises         Problem List Items Addressed This Visit        Other    Anxiety and depression     Patient does report some improvement with medication  Also has been seeing therapy  Will increase dose to 100 mg  Continue with therapy  Continue self-care  Continue deep breathing exercises         Relevant Medications    sertraline (ZOLOFT) 100 mg tablet    Screening for colon cancer - Primary    Relevant Orders    Ambulatory referral for colonoscopy            Subjective:      Patient ID: Jaskaran Soto is a 55 y o  female  Patient is here for follow-up regarding anxiety  Did start Zoloft and therapy  States that the Zoloft has been helping  Has decreased days of feeling anxious and overwhelmed  Patient did follow with gyn for abnormal bleeding  Currently patient feels some improvement but feels like she might benefit from a increase dosage of the Zoloft  The following portions of the patient's history were reviewed and updated as appropriate: allergies, current medications, past family history, past medical history, past social history, past surgical history and problem list     Review of Systems   Constitutional: Negative  HENT: Negative  Eyes: Negative  Respiratory: Negative  Cardiovascular: Negative  Gastrointestinal: Negative  Endocrine: Negative  Genitourinary: Positive for vaginal bleeding  Musculoskeletal: Negative  Allergic/Immunologic: Negative  Neurological: Negative  Psychiatric/Behavioral: Positive for dysphoric mood  The patient is nervous/anxious            Objective:      /70   Pulse 71   Temp 98 7 °F (37 1 °C)   Resp 18   Ht 5' 1" (1 549 m)   Wt 58 8 kg (129 lb 9 6 oz)   LMP 07/29/2022 (Exact Date)   SpO2 99%   BMI 24 49 kg/m²          Physical Exam  Vitals and nursing note reviewed  Constitutional:       Appearance: She is well-developed  HENT:      Head: Normocephalic and atraumatic  Right Ear: External ear normal       Left Ear: External ear normal    Eyes:      Pupils: Pupils are equal, round, and reactive to light  Cardiovascular:      Rate and Rhythm: Normal rate and regular rhythm  Pulmonary:      Effort: Pulmonary effort is normal    Abdominal:      General: Bowel sounds are normal       Palpations: Abdomen is soft  Musculoskeletal:         General: Normal range of motion  Cervical back: Normal range of motion  Skin:     General: Skin is warm and dry  Neurological:      Mental Status: She is alert and oriented to person, place, and time  Psychiatric:         Attention and Perception: Attention and perception normal          Mood and Affect: Mood is anxious  Speech: Speech is rapid and pressured (Some improvement since last visit)  Behavior: Behavior normal          Thought Content:  Thought content normal          Cognition and Memory: Cognition and memory normal          Judgment: Judgment normal

## 2022-08-24 ENCOUNTER — SOCIAL WORK (OUTPATIENT)
Dept: BEHAVIORAL/MENTAL HEALTH CLINIC | Facility: CLINIC | Age: 47
End: 2022-08-24
Payer: COMMERCIAL

## 2022-08-24 DIAGNOSIS — F41.9 ANXIETY AND DEPRESSION: Primary | ICD-10-CM

## 2022-08-24 DIAGNOSIS — F32.A ANXIETY AND DEPRESSION: Primary | ICD-10-CM

## 2022-08-24 PROCEDURE — 90834 PSYTX W PT 45 MINUTES: CPT | Performed by: SOCIAL WORKER

## 2022-08-25 NOTE — PSYCH
12:00pm-12:45pm    Assessment/Plan: f/u in two weeks     There are no diagnoses linked to this encounter  Subjective: Therapist met w/pt for individual session  Pt stated she just got back from a weekend away and in some ways she feels like that helped  Therapist and pt discussed several factors contributing to her improved emotional stability this week  Pt stated she is trying to focus on the positives rather than dwelling on the negatives  Pt stated that when she feels as if she is can't control her emotions that creates even more anxiety for her  Therapist utilized some CBT strategies w/pt to help challenge her anxious thoughts  Therapist also continued to educate pt on different mindfulness strategies to help ground her  Therapist educated pt on working on accepting her feelings rather than criticizing them  Patient ID: Leslie Mondragon is a 55 y o  female  HPI 45 minutes    Review of Systems      Objective: Pt appeared to be in a good mood         Physical Exam  Pt denied any SI/HI/Ah/VH

## 2022-09-06 ENCOUNTER — TELEPHONE (OUTPATIENT)
Dept: OBGYN CLINIC | Facility: CLINIC | Age: 47
End: 2022-09-06

## 2022-09-29 ENCOUNTER — OFFICE VISIT (OUTPATIENT)
Dept: GASTROENTEROLOGY | Facility: CLINIC | Age: 47
End: 2022-09-29
Payer: COMMERCIAL

## 2022-09-29 ENCOUNTER — PREP FOR PROCEDURE (OUTPATIENT)
Dept: GASTROENTEROLOGY | Facility: CLINIC | Age: 47
End: 2022-09-29

## 2022-09-29 VITALS
SYSTOLIC BLOOD PRESSURE: 110 MMHG | BODY MASS INDEX: 25 KG/M2 | WEIGHT: 132.4 LBS | DIASTOLIC BLOOD PRESSURE: 80 MMHG | OXYGEN SATURATION: 98 % | HEIGHT: 61 IN | HEART RATE: 58 BPM

## 2022-09-29 DIAGNOSIS — Z12.11 SCREENING FOR COLON CANCER: Primary | ICD-10-CM

## 2022-09-29 DIAGNOSIS — K58.0 IRRITABLE BOWEL SYNDROME WITH DIARRHEA: Primary | ICD-10-CM

## 2022-09-29 DIAGNOSIS — Z12.11 SCREENING FOR COLON CANCER: ICD-10-CM

## 2022-09-29 DIAGNOSIS — R11.0 NAUSEA: ICD-10-CM

## 2022-09-29 DIAGNOSIS — R68.81 EARLY SATIETY: ICD-10-CM

## 2022-09-29 DIAGNOSIS — Z12.11 COLON CANCER SCREENING: ICD-10-CM

## 2022-09-29 PROCEDURE — 99203 OFFICE O/P NEW LOW 30 MIN: CPT | Performed by: PHYSICIAN ASSISTANT

## 2022-09-29 RX ORDER — PANTOPRAZOLE SODIUM 40 MG/1
40 TABLET, DELAYED RELEASE ORAL DAILY
Qty: 30 TABLET | Refills: 3 | Status: SHIPPED | OUTPATIENT
Start: 2022-09-29

## 2022-09-29 RX ORDER — DICYCLOMINE HCL 20 MG
20 TABLET ORAL EVERY 6 HOURS PRN
Qty: 45 TABLET | Refills: 2 | Status: SHIPPED | OUTPATIENT
Start: 2022-09-29

## 2022-09-29 NOTE — PATIENT INSTRUCTIONS
Scheduled date of EGD/colonoscopy (as of today):12/15/22  Physician performing EGD/colonoscopy:Jammie  Location of EGD/colonoscopy:[facility]  Desired bowel prep reviewed with patient:Shamika/Miralax  Instructions reviewed with patient by:Lb vragas  Clearances:   none

## 2022-09-29 NOTE — PROGRESS NOTES
[facility] - Outpatient Consultation  Cecile Selby 55 y o  female MRN: [id number]  Encounter: [id number]          ASSESSMENT AND PLAN:      1  Colon cancer screening  2  Irritable bowel syndrome with diarrhea  3  Early Satiety  4  Nausea  Will plan EGD and Colonoscopy  Start fiber and probiotic  Use Pantoprazole 40mg daily x 8 weeks  Use dicyclomine prn  Reviewed dietary modifications and plans to include possible GFD or low FODMAP diet  She is very interested in hollistic options - discussed peppermint oil, digestive enzymes and Aloe Vera    Will give a Xifaxan course    She will attempt to cut back on Advil    ______________________________________________________________________    HPI:  25-year-old female with a long history of diarrhea predominant irritable bowel syndrome, and recurrent diverticulitis presents for evaluation of abdominal pain, diarrhea, bloating, nausea and early satiety  She notes that she has struggled with symptoms of this since she was at least 15years old  She did see a gastroenterologist about 20 years ago who performed a colonoscopy and she was told this was a normal exam   He did review certain dietary restrictions with her but otherwise offered no relief in her symptoms  She has recently worked hard with her  to try to modify her diet with minimal to no relief in her symptoms  She notes that most days she has diarrhea  She suffers from abdominal pain and discomfort  She knows bloating and gaseousness  She also has recently suffered with nausea and early satiety  She admits that she takes large quantities of Advil due to migraines  He understands how this is problematic  She denies any rectal bleeding or hematemesis  She also admits that she struggles with anxiety  She is also very aware of how this contributes to her symptoms  She recently started Zoloft with her family doctor and has noted improvement in this symptom    She reports that her family all struggles with various gastrointestinal issues  Most of her family members do struggle with irritable bowel syndrome  She has also been diagnosed with diverticulitis 2 times over the past few years once in  and once last December  Each time she was treated successfully with antibiotics  REVIEW OF SYSTEMS:    CONSTITUTIONAL: Denies any fever, chills, rigors, and weight loss  HEENT: No earache or tinnitus  Denies hearing loss or visual disturbances  CARDIOVASCULAR: No chest pain or palpitations  RESPIRATORY: Denies any cough, hemoptysis, shortness of breath or dyspnea on exertion  GASTROINTESTINAL: As noted in the History of Present Illness  GENITOURINARY: No problems with urination  Denies any hematuria or dysuria  NEUROLOGIC: No dizziness or vertigo, denies headaches  MUSCULOSKELETAL: Denies any muscle or joint pain  SKIN: Denies skin rashes or itching  ENDOCRINE: Denies excessive thirst  Denies intolerance to heat or cold  PSYCHOSOCIAL: Denies depression or anxiety  Denies any recent memory loss         Historical Information   Past Medical History:   Diagnosis Date    Migraines     Varicella      Past Surgical History:   Procedure Laterality Date    ABDOMINOPLASTY N/A 10/8/2020    Procedure: ABDOMINOPLASTY;  Surgeon: Concetta Miller MD;  Location: [address] [place] [address];  Service: Plastics    APPENDECTOMY      TUBAL LIGATION       Social History   Social History     Substance and Sexual Activity   Alcohol Use Yes    Comment: Social     Social History     Substance and Sexual Activity   Drug Use Never     Social History     Tobacco Use   Smoking Status Former Smoker    Quit date: 2014    Years since quittin 7   Smokeless Tobacco Never Used     Family History   Problem Relation Age of Onset    No Known Problems Mother     Cancer Father     Cancer Paternal Grandmother     Ovarian cancer Neg Hx     Cervical cancer Neg Hx     Uterine cancer Neg Hx  Breast cancer Neg Hx        Meds/Allergies       Current Outpatient Medications:     sertraline (ZOLOFT) 100 mg tablet    Allergies   Allergen Reactions    Prochlorperazine Hives and Other (See Comments)    Asa [Aspirin] Vomiting    Ondansetron Anxiety           Objective     Blood pressure 110/80, pulse 58, height 5' 1" (1 549 m), weight 60 1 kg (132 lb 6 4 oz), SpO2 98 %, not currently breastfeeding  Body mass index is 25 02 kg/m²  PHYSICAL EXAM:      General Appearance:   Alert, cooperative, no distress   HEENT:   Normocephalic, atraumatic, anicteric      Neck:  Supple, symmetrical, trachea midline   Lungs:   Clear to auscultation bilaterally; no rales, rhonchi or wheezing; respirations unlabored    Heart[de-identified]   Regular rate and rhythm; no murmur, rub, or gallop  Abdomen:   Soft, non-tender, non-distended; normal bowel sounds; no masses, no organomegaly    Genitalia:   Deferred    Rectal:   Deferred    Extremities:  No cyanosis, clubbing or edema    Pulses:  2+ and symmetric    Skin:  No jaundice, rashes, or lesions    Lymph nodes:  No palpable cervical lymphadenopathy        Lab Results:   No visits with results within 1 Day(s) from this visit     Latest known visit with results is:   Appointment on 07/07/2022   Component Date Value    WBC 07/07/2022 5 91     RBC 07/07/2022 4 31     Hemoglobin 07/07/2022 14 7     Hematocrit 07/07/2022 43 3     MCV 07/07/2022 101 (A)    MCH 07/07/2022 34 1     MCHC 07/07/2022 33 9     RDW 07/07/2022 11 8     MPV 07/07/2022 11 8     Platelets 53/07/0790 282     nRBC 07/07/2022 0     Neutrophils Relative 07/07/2022 57     Immat GRANS % 07/07/2022 0     Lymphocytes Relative 07/07/2022 34     Monocytes Relative 07/07/2022 7     Eosinophils Relative 07/07/2022 2     Basophils Relative 07/07/2022 0     Neutrophils Absolute 07/07/2022 3 36     Immature Grans Absolute 07/07/2022 0 02     Lymphocytes Absolute 07/07/2022 2 00     Monocytes Absolute 07/07/2022 0 41     Eosinophils Absolute 07/07/2022 0 10     Basophils Absolute 07/07/2022 0 02     Sodium 07/07/2022 141     Potassium 07/07/2022 4 1     Chloride 07/07/2022 103     CO2 07/07/2022 27     ANION GAP 07/07/2022 11     BUN 07/07/2022 13     Creatinine 07/07/2022 0 75     Glucose, Fasting 07/07/2022 98     Calcium 07/07/2022 9 2     AST 07/07/2022 16     ALT 07/07/2022 19     Alkaline Phosphatase 07/07/2022 48     Total Protein 07/07/2022 7 3     Albumin 07/07/2022 4 2     Total Bilirubin 07/07/2022 0 50     eGFR 07/07/2022 95     Cholesterol 07/07/2022 227 (A)    Triglycerides 07/07/2022 132     HDL, Direct 07/07/2022 59     LDL Calculated 07/07/2022 142 (A)    Non-HDL-Chol (CHOL-HDL) 07/07/2022 168     TSH 3RD GENERATON 07/07/2022 0 930     Vit D, 25-Hydroxy 07/07/2022 23 3 (A)    Cortisol - AM 07/07/2022 9 5     FSH 07/07/2022 46 6     LH 07/07/2022 42 7     Iron Saturation 07/07/2022 29     TIBC 07/07/2022 321     Iron 07/07/2022 94     Ferritin 07/07/2022 22          Radiology Results:   No results found

## 2022-10-12 PROBLEM — Z01.419 ENCOUNTER FOR GYNECOLOGICAL EXAMINATION: Status: RESOLVED | Noted: 2021-08-04 | Resolved: 2022-10-12

## 2022-10-15 PROBLEM — Z12.11 SCREENING FOR COLON CANCER: Status: RESOLVED | Noted: 2022-08-16 | Resolved: 2022-10-15

## 2022-10-25 DIAGNOSIS — F41.9 ANXIETY AND DEPRESSION: Primary | ICD-10-CM

## 2022-10-25 DIAGNOSIS — F32.A ANXIETY AND DEPRESSION: Primary | ICD-10-CM

## 2022-10-25 DIAGNOSIS — F41.9 ANXIETY AND DEPRESSION: ICD-10-CM

## 2022-10-25 DIAGNOSIS — F32.A ANXIETY AND DEPRESSION: ICD-10-CM

## 2022-10-25 RX ORDER — SERTRALINE HYDROCHLORIDE 100 MG/1
TABLET, FILM COATED ORAL
Qty: 90 TABLET | Refills: 2 | Status: SHIPPED | OUTPATIENT
Start: 2022-10-25 | End: 2022-10-25 | Stop reason: DRUGHIGH

## 2022-11-22 ENCOUNTER — CONSULT (OUTPATIENT)
Dept: NEUROLOGY | Facility: CLINIC | Age: 47
End: 2022-11-22

## 2022-11-22 VITALS
TEMPERATURE: 98 F | HEART RATE: 88 BPM | SYSTOLIC BLOOD PRESSURE: 118 MMHG | OXYGEN SATURATION: 99 % | HEIGHT: 61 IN | BODY MASS INDEX: 25.02 KG/M2 | DIASTOLIC BLOOD PRESSURE: 72 MMHG

## 2022-11-22 DIAGNOSIS — G43.019 INTRACTABLE MIGRAINE WITHOUT AURA AND WITHOUT STATUS MIGRAINOSUS: ICD-10-CM

## 2022-11-22 DIAGNOSIS — R51.9 CHRONIC DAILY HEADACHE: Primary | ICD-10-CM

## 2022-11-22 DIAGNOSIS — M54.81 OCCIPITAL NEURALGIA OF RIGHT SIDE: ICD-10-CM

## 2022-11-22 RX ORDER — TOPIRAMATE 25 MG/1
25 CAPSULE, COATED PELLETS ORAL 2 TIMES DAILY
Qty: 60 CAPSULE | Refills: 5 | Status: SHIPPED | OUTPATIENT
Start: 2022-11-22

## 2022-11-22 RX ORDER — RIMEGEPANT SULFATE 75 MG/75MG
TABLET, ORALLY DISINTEGRATING ORAL
Qty: 10 TABLET | Refills: 4 | Status: SHIPPED | OUTPATIENT
Start: 2022-11-22

## 2022-11-22 NOTE — PROGRESS NOTES
Lidia Mackenzie is a 55 y o  female  Chief Complaint   Patient presents with   • Intractable migraine without aura and without status migrai       Assessment:  1  Chronic daily headache    2  Intractable migraine without aura and without status migrainosus    3  Occipital neuralgia of right side        Plan:  MRI scan of the brain with and without contrast   Blood work  Topamax 25 milligram twice a day  Nurtec 75 milligrams p o  p r n  headache maximum twice a week  Avoid migraine triggers  May need to see a pain specialist for occipital nerve blocks    Discussion:  Differential diagnosis discussed with the patient, I think patient has a few different types of headaches she has chronic daily headaches in addition to her migraine headache and there is a possibility that she also has a right occipital neuralgia and some component of analgesic rebound headache, she has a prior history of a venous anomaly on the MRI scan of the brain in 2013 we will repeat the MRI scan to see if there is any change and also would recommend routine blood work patient to call me after the test to discuss the results, I have advised the patient to avoid migraine triggers which we discussed in detail including foods to avoid and to limit caffeine to 12-16 ounces per day avoid stress sleep regularly for 7-8 hours a night and keep herself well hydrated, and avoid excessive use of over-the-counter analgesics and limit them to maximum twice a week to prevent analgesic rebound headaches      Patient has failed multiple medications in the past she had some relief with Topamax in the past side effects of Topamax were discussed with her she is agreeable to go back on Topamax at 25 milligram twice a day she will call me and let me know how she does on the current dose if needed we can titrated future if she develops any side effects then to stop the medication, also patient has failed multiple triptans and hence she was given a prescription for Nurtec 75 milligrams once a day p r n  headache maximum twice a week to see how she does, she may need to see a pain specialist in the future for possible occipital nerve block if her symptoms do not get better with the medication other possibilities also can be we could try Depakote in the future CGRP injections and Botox  To keep her blood pressure cholesterol sugar under control to go to the hospital if has any worsening symptoms and call me otherwise to see me back in 3 months and follow up with the other physicians      Subjective:    HPI   Patient is here for evaluation of headaches since she was 15years of age, she has seen multiple neurologists in the past for headaches but lately has not been in follow up with any neurologist and she is been tried on multiple medications, she has 2 different types of headaches she has a daily headache every day mostly on the right side and sometimes she does wake up with the headaches in the morning between 4 and 5 a m  is throbbing in nature associated with photophobia phonophobia no nausea vomiting it could be 5-6 on 10 and she has big migraine headaches mostly during her menstrual periods wherein those headaches are incapacitating to her, she also has pain starting from the right occipital area radiating to the front of the right side especially when she is lying down on a pillow sometimes she would feel sensitivity in the right occipital area, sometimes she would have nausea and vomiting and tingling on the scalp, no motor or sensory symptoms in upper or lower extremities, overall she gets at least 8 headaches a month and she is been taking ibuprofen 2 to 3 times a week mostly during her menstrual periods, she has tried multiple medications in the past including  Imitrex  Zomig  Relpax  Axert, Maxalt, Inderal, Neurontin, Fiorinal with codeine, tramadol, gabapentin, hydroxyzine, meloxicam, amitriptyline, Frova, Flexeril and a nasal spray, without much relief or had side effects  She was on Topamax in 2011 which seems to have helped her at that time but she lost insurance  She has also tried multiple over-the-counter medications including Advil Tylenol Aleve Excedrin and Motrin in the hospital they have used her on Zofran morphine Demerol Percocet Vicodin oxycodone  She has tried natural remedies including magnesium essential oils and has done neck stretches  She has had multiple triggers including menstruation stress alcohol caffeine foods prolonged bright light strong smells weather changes not eating or sleeping enough for disrupted and medication overuse  Appetite is good weight has been stable, mood is good sleep is slightly disturbed she lives with her  and has a strong family history of cluster migraine headaches  No other complaints      Vitals:    11/22/22 1205   BP: 118/72   BP Location: Right arm   Patient Position: Sitting   Cuff Size: Adult   Pulse: 88   Temp: 98 °F (36 7 °C)   TempSrc: Temporal   SpO2: 99%   Height: 5' 1" (1 549 m)       Current Medications    Current Outpatient Medications:   •  dicyclomine (BENTYL) 20 mg tablet, Take 1 tablet (20 mg total) by mouth every 6 (six) hours as needed (abdominal pain), Disp: 45 tablet, Rfl: 2  •  pantoprazole (PROTONIX) 40 mg tablet, Take 1 tablet (40 mg total) by mouth daily, Disp: 30 tablet, Rfl: 3  •  sertraline (Zoloft) 50 mg tablet, Take 1 tablet (50 mg total) by mouth daily, Disp: 30 tablet, Rfl: 5      Allergies  Prochlorperazine, Asa [aspirin], and Ondansetron    Past Medical History  Past Medical History:   Diagnosis Date   • Migraines    • Varicella          Past Surgical History:  Past Surgical History:   Procedure Laterality Date   • ABDOMINOPLASTY N/A 10/8/2020    Procedure: ABDOMINOPLASTY;  Surgeon: Kendal Felix MD;  Location: [address] [place] [address];  Service: Plastics   • APPENDECTOMY     • TUBAL LIGATION  2001         Family History:  Family History   Problem Relation Age of Onset   • No Known Problems Mother    • Cancer Father    • Cancer Paternal Grandmother    • Ovarian cancer Neg Hx    • Cervical cancer Neg Hx    • Uterine cancer Neg Hx    • Breast cancer Neg Hx        Social History:   reports that she quit smoking about 7 years ago  Her smoking use included cigarettes  She has never used smokeless tobacco  She reports current alcohol use  She reports that she does not use drugs  I have reviewed the past medical history, surgical history, social and family history, current medications, allergies vitals, review of systems, and updated this information as appropriate today  Objective:    Physical Exam    Neurological Exam    GENERAL:  Cooperative in no acute distress  Well-developed and well-nourished    HEAD and NECK   Head is atraumatic normocephalic with no lesions or masses  Neck is supple with full range of motion    CARDIOVASCULAR  Carotid Arteries-no carotid bruits  NEUROLOGIC:  Mental Status-the patient is awake alert and oriented without aphasia or apraxia  Cranial Nerves: Visual fields are full to confrontation  Visual acuity is 20/20 with hand-held chart  Extraocular movements are full without nystagmus  Pupils are 2-1/2 mm and reactive  Face is symmetrical to light touch  Movements of facial expression move symmetrically  Hearing is normal to finger rub bilaterally  Soft palate lifts symmetrically  Shoulder shrug is symmetrical  Tongue is midline without atrophy  Motor: No drift is noted on arm extension  Strength is full in the upper and lower extremities with normal bulk and tone  Sensory: Intact to temperature and vibratory sensation in the upper and lower extremities bilaterally  Cortical function is intact  Coordination: Finger to nose testing is performed accurately  Romberg is negative  Gait reveals a normal base with symmetrical arm swing   Tandem walk is normal   Reflexes:      2+ and symmetrical   No temporal artery tenderness, no spine tenderness, tenderness in the right occipital nerve area          ROS:  Review of Systems   Constitutional: Negative  Negative for appetite change and fever  HENT: Negative  Negative for hearing loss, tinnitus, trouble swallowing and voice change  Eyes: Negative  Negative for photophobia, pain and visual disturbance  Respiratory: Negative  Negative for shortness of breath  Cardiovascular: Negative  Negative for palpitations  Gastrointestinal: Negative  Negative for nausea and vomiting  Endocrine: Negative  Negative for cold intolerance  Genitourinary: Negative  Negative for dysuria, frequency and urgency  Musculoskeletal: Negative  Negative for gait problem, myalgias and neck pain  Skin: Negative  Negative for rash  Allergic/Immunologic: Negative  Neurological: Positive for headaches  Negative for dizziness, tremors, seizures, syncope, facial asymmetry, speech difficulty, weakness, light-headedness and numbness  Hematological: Negative  Does not bruise/bleed easily  Psychiatric/Behavioral: Negative  Negative for confusion, hallucinations and sleep disturbance

## 2022-11-25 DIAGNOSIS — F32.A ANXIETY AND DEPRESSION: ICD-10-CM

## 2022-11-25 DIAGNOSIS — F41.9 ANXIETY AND DEPRESSION: ICD-10-CM

## 2022-11-29 ENCOUNTER — TELEPHONE (OUTPATIENT)
Dept: GASTROENTEROLOGY | Facility: CLINIC | Age: 47
End: 2022-11-29

## 2022-11-29 NOTE — TELEPHONE ENCOUNTER
rcvd fax from [facility] needing auth for [facility]  Completed form that was faxed and faxed back to [facility] with clinicals    Awaiting decision

## 2022-11-30 NOTE — TELEPHONE ENCOUNTER
[employer] called stating Yenifer Briceño has been authorized for approval  Approval number [id number]

## 2022-12-14 DIAGNOSIS — G43.019 INTRACTABLE MIGRAINE WITHOUT AURA AND WITHOUT STATUS MIGRAINOSUS: ICD-10-CM

## 2022-12-14 RX ORDER — TOPIRAMATE 25 MG/1
CAPSULE, COATED PELLETS ORAL
Qty: 180 CAPSULE | Refills: 2 | Status: SHIPPED | OUTPATIENT
Start: 2022-12-14

## 2022-12-15 ENCOUNTER — ANESTHESIA (OUTPATIENT)
Dept: GASTROENTEROLOGY | Facility: HOSPITAL | Age: 47
End: 2022-12-15

## 2022-12-15 ENCOUNTER — ANESTHESIA EVENT (OUTPATIENT)
Dept: GASTROENTEROLOGY | Facility: HOSPITAL | Age: 47
End: 2022-12-15

## 2022-12-15 ENCOUNTER — HOSPITAL ENCOUNTER (OUTPATIENT)
Dept: GASTROENTEROLOGY | Facility: HOSPITAL | Age: 47
Setting detail: OUTPATIENT SURGERY
End: 2022-12-15
Attending: INTERNAL MEDICINE

## 2022-12-15 VITALS
SYSTOLIC BLOOD PRESSURE: 108 MMHG | HEART RATE: 68 BPM | BODY MASS INDEX: 23.6 KG/M2 | HEIGHT: 61 IN | RESPIRATION RATE: 20 BRPM | TEMPERATURE: 98.4 F | OXYGEN SATURATION: 100 % | WEIGHT: 125 LBS | DIASTOLIC BLOOD PRESSURE: 70 MMHG

## 2022-12-15 DIAGNOSIS — Z12.11 SCREENING FOR COLON CANCER: ICD-10-CM

## 2022-12-15 DIAGNOSIS — R11.0 NAUSEA: ICD-10-CM

## 2022-12-15 LAB
EXT PREGNANCY TEST URINE: NEGATIVE
EXT. CONTROL: NORMAL

## 2022-12-15 RX ORDER — RIFAXIMIN 550 MG/1
TABLET ORAL
COMMUNITY
Start: 2022-11-30

## 2022-12-15 RX ORDER — LIDOCAINE HYDROCHLORIDE 20 MG/ML
INJECTION, SOLUTION EPIDURAL; INFILTRATION; INTRACAUDAL; PERINEURAL AS NEEDED
Status: DISCONTINUED | OUTPATIENT
Start: 2022-12-15 | End: 2022-12-15

## 2022-12-15 RX ORDER — PROPOFOL 10 MG/ML
INJECTION, EMULSION INTRAVENOUS AS NEEDED
Status: DISCONTINUED | OUTPATIENT
Start: 2022-12-15 | End: 2022-12-15

## 2022-12-15 RX ORDER — SODIUM CHLORIDE, SODIUM LACTATE, POTASSIUM CHLORIDE, CALCIUM CHLORIDE 600; 310; 30; 20 MG/100ML; MG/100ML; MG/100ML; MG/100ML
INJECTION, SOLUTION INTRAVENOUS CONTINUOUS PRN
Status: DISCONTINUED | OUTPATIENT
Start: 2022-12-15 | End: 2022-12-15

## 2022-12-15 RX ORDER — PHENYLEPHRINE HYDROCHLORIDE 10 MG/ML
INJECTION INTRAVENOUS AS NEEDED
Status: DISCONTINUED | OUTPATIENT
Start: 2022-12-15 | End: 2022-12-15

## 2022-12-15 RX ORDER — IBUPROFEN 200 MG
TABLET ORAL EVERY 6 HOURS PRN
COMMUNITY

## 2022-12-15 RX ORDER — FENTANYL CITRATE 50 UG/ML
INJECTION, SOLUTION INTRAMUSCULAR; INTRAVENOUS AS NEEDED
Status: DISCONTINUED | OUTPATIENT
Start: 2022-12-15 | End: 2022-12-15

## 2022-12-15 RX ADMIN — LIDOCAINE HYDROCHLORIDE 100 MG: 20 INJECTION, SOLUTION EPIDURAL; INFILTRATION; INTRACAUDAL at 08:31

## 2022-12-15 RX ADMIN — PROPOFOL 50 MG: 10 INJECTION, EMULSION INTRAVENOUS at 08:38

## 2022-12-15 RX ADMIN — PROPOFOL 50 MG: 10 INJECTION, EMULSION INTRAVENOUS at 08:33

## 2022-12-15 RX ADMIN — PROPOFOL 30 MG: 10 INJECTION, EMULSION INTRAVENOUS at 08:52

## 2022-12-15 RX ADMIN — SODIUM CHLORIDE, SODIUM LACTATE, POTASSIUM CHLORIDE, AND CALCIUM CHLORIDE: .6; .31; .03; .02 INJECTION, SOLUTION INTRAVENOUS at 08:50

## 2022-12-15 RX ADMIN — PROPOFOL 150 MG: 10 INJECTION, EMULSION INTRAVENOUS at 08:36

## 2022-12-15 RX ADMIN — PHENYLEPHRINE HYDROCHLORIDE 100 MCG: 10 INJECTION INTRAVENOUS at 08:43

## 2022-12-15 RX ADMIN — SODIUM CHLORIDE, SODIUM LACTATE, POTASSIUM CHLORIDE, AND CALCIUM CHLORIDE: .6; .31; .03; .02 INJECTION, SOLUTION INTRAVENOUS at 08:23

## 2022-12-15 RX ADMIN — FENTANYL CITRATE 25 MCG: 50 INJECTION INTRAMUSCULAR; INTRAVENOUS at 08:31

## 2022-12-15 RX ADMIN — PROPOFOL 50 MG: 10 INJECTION, EMULSION INTRAVENOUS at 08:43

## 2022-12-15 RX ADMIN — PROPOFOL 150 MG: 10 INJECTION, EMULSION INTRAVENOUS at 08:31

## 2022-12-15 RX ADMIN — PROPOFOL 20 MG: 10 INJECTION, EMULSION INTRAVENOUS at 08:47

## 2022-12-15 NOTE — ANESTHESIA PREPROCEDURE EVALUATION
Procedure:  COLONOSCOPY  EGD    Relevant Problems   CARDIO   (+) Migraine      NEURO/PSYCH   (+) Anxiety and depression   (+) Chronic daily headache   (+) Migraine      IBS    Physical Exam    Airway    Mallampati score: II  TM Distance: >3 FB  Neck ROM: full     Dental   Comment: Denies loose teeth,     Cardiovascular  Cardiovascular exam normal    Pulmonary  Pulmonary exam normal     Other Findings  Portions of exam deferred due to low yield and/or unknown COVID status      Anesthesia Plan  ASA Score- 2     Anesthesia Type- IV sedation with anesthesia with ASA Monitors  Additional Monitors:   Airway Plan:           Plan Factors-Exercise tolerance (METS): >4 METS  Chart reviewed  Existing labs reviewed  Patient summary reviewed  Patient is not a current smoker  Induction- intravenous  Postoperative Plan-     Informed Consent- Anesthetic plan and risks discussed with patient  I personally reviewed this patient with the CRNA  Discussed and agreed on the Anesthesia Plan with the CRNA  May Danielle

## 2022-12-15 NOTE — H&P
History and Physical - [facility]  Edith Bower 55 y o  female MRN: [id number]      HPI: Edith Bower is a 55y o  year old female who presents for evaluation of early satiety, nausea, screening colonoscopy      REVIEW OF SYSTEMS: Per the HPI, and otherwise unremarkable  Historical Information   Past Medical History:   Diagnosis Date   • GERD (gastroesophageal reflux disease)    • IBS (irritable bowel syndrome)    • Migraines    • Varicella      Past Surgical History:   Procedure Laterality Date   • ABDOMINOPLASTY N/A 10/8/2020    Procedure: ABDOMINOPLASTY;  Surgeon: Kavitha Chinchilla MD;  Location: [address] [place] [address];  Service: Plastics   • APPENDECTOMY     • TUBAL LIGATION       Social History   Social History     Substance and Sexual Activity   Alcohol Use Yes    Comment: A couple times a month     Social History     Substance and Sexual Activity   Drug Use Yes   • Types: Marijuana     Social History     Tobacco Use   Smoking Status Former   • Types: Cigarettes   • Quit date: 2014   • Years since quittin 0   Smokeless Tobacco Never     Family History   Problem Relation Age of Onset   • No Known Problems Mother    • Cancer Father    • Cancer Paternal Grandmother    • Ovarian cancer Neg Hx    • Cervical cancer Neg Hx    • Uterine cancer Neg Hx    • Breast cancer Neg Hx        Meds/Allergies     (Not in a hospital admission)      Allergies   Allergen Reactions   • Prochlorperazine Hives and Other (See Comments)   • Asa [Aspirin] Vomiting   • Ondansetron Anxiety       Objective     Blood pressure 146/90, pulse 92, temperature 97 8 °F (36 6 °C), temperature source Temporal, resp  rate 20, height 5' 1" (1 549 m), weight 56 7 kg (125 lb), last menstrual period 2022, SpO2 98 %, not currently breastfeeding        PHYSICAL EXAM    Gen: NAD  CV: RRR  CHEST: Clear  ABD: soft, NT/ND  EXT: no edema      ASSESSMENT/PLAN:  This is a 55y o  year old female here for esophagogastroduodenoscopy with biopsy, colonoscopy, and she is stable and optimized for her procedure

## 2022-12-15 NOTE — ANESTHESIA POSTPROCEDURE EVALUATION
Post-Op Assessment Note    CV Status:  Stable  Pain Score: 0    Pain management: adequate     Mental Status:  Alert and awake   Hydration Status:  Euvolemic   PONV Controlled:  Controlled   Airway Patency:  Patent and adequate      Post Op Vitals Reviewed: Yes      Staff: CRNA         No notable events documented      BP   98/54   Temp      Pulse  69   Resp   20   SpO2   98

## 2022-12-21 ENCOUNTER — APPOINTMENT (OUTPATIENT)
Dept: LAB | Facility: HOSPITAL | Age: 47
End: 2022-12-21

## 2022-12-21 ENCOUNTER — HOSPITAL ENCOUNTER (OUTPATIENT)
Dept: MRI IMAGING | Facility: HOSPITAL | Age: 47
Discharge: HOME | End: 2022-12-21
Attending: PSYCHIATRY & NEUROLOGY

## 2022-12-21 DIAGNOSIS — G43.019 INTRACTABLE MIGRAINE WITHOUT AURA AND WITHOUT STATUS MIGRAINOSUS: ICD-10-CM

## 2022-12-21 LAB
ALBUMIN SERPL BCP-MCNC: 3.8 G/DL (ref 3.5–5)
ALP SERPL-CCNC: 44 U/L (ref 46–116)
ALT SERPL W P-5'-P-CCNC: 17 U/L (ref 12–78)
ANION GAP SERPL CALCULATED.3IONS-SCNC: 4 MMOL/L (ref 4–13)
AST SERPL W P-5'-P-CCNC: 11 U/L (ref 5–45)
B BURGDOR IGG+IGM SER-ACNC: <0.2 AI
BILIRUB SERPL-MCNC: 0.37 MG/DL (ref 0.2–1)
BUN SERPL-MCNC: 17 MG/DL (ref 5–25)
CALCIUM SERPL-MCNC: 9.1 MG/DL (ref 8.3–10.1)
CHLORIDE SERPL-SCNC: 110 MMOL/L (ref 96–108)
CO2 SERPL-SCNC: 29 MMOL/L (ref 21–32)
CREAT SERPL-MCNC: 0.74 MG/DL (ref 0.6–1.3)
CRP SERPL QL: <3 MG/L
ERYTHROCYTE [DISTWIDTH] IN BLOOD BY AUTOMATED COUNT: 11.9 % (ref 11.6–15.1)
ERYTHROCYTE [SEDIMENTATION RATE] IN BLOOD: 1 MM/HOUR (ref 0–19)
GFR SERPL CREATININE-BSD FRML MDRD: 97 ML/MIN/1.73SQ M
GLUCOSE P FAST SERPL-MCNC: 104 MG/DL (ref 65–99)
HCT VFR BLD AUTO: 40.4 % (ref 34.8–46.1)
HGB BLD-MCNC: 13.5 G/DL (ref 11.5–15.4)
MCH RBC QN AUTO: 33.5 PG (ref 26.8–34.3)
MCHC RBC AUTO-ENTMCNC: 33.4 G/DL (ref 31.4–37.4)
MCV RBC AUTO: 100 FL (ref 82–98)
PLATELET # BLD AUTO: 271 THOUSANDS/UL (ref 149–390)
PMV BLD AUTO: 11.2 FL (ref 8.9–12.7)
POTASSIUM SERPL-SCNC: 4.5 MMOL/L (ref 3.5–5.3)
PROT SERPL-MCNC: 7 G/DL (ref 6.4–8.4)
RBC # BLD AUTO: 4.03 MILLION/UL (ref 3.81–5.12)
SODIUM SERPL-SCNC: 143 MMOL/L (ref 135–147)
WBC # BLD AUTO: 6.71 THOUSAND/UL (ref 4.31–10.16)

## 2022-12-21 RX ADMIN — GADOBUTROL 5 ML: 604.72 INJECTION INTRAVENOUS at 06:45

## 2022-12-22 ENCOUNTER — TELEPHONE (OUTPATIENT)
Dept: GASTROENTEROLOGY | Facility: CLINIC | Age: 47
End: 2022-12-22

## 2022-12-22 NOTE — TELEPHONE ENCOUNTER
Katherine Arguello pt  Patient called, returning call from [facility]  Please call [phone]

## 2022-12-22 NOTE — TELEPHONE ENCOUNTER
Called lacy eubanks got   LMOM for patient to return call to office   Will also try to all again later

## 2022-12-22 NOTE — TELEPHONE ENCOUNTER
----- Message from Donny Clemons DO sent at 12/21/2022  6:09 PM EST -----  Please call the patient with the biopsy results  Biopsy of the colon showed no significant abnormalities consistent with microscopic colitis or cancer  The biopsies of stomach and the duodenum were both benign  There is no evidence of celiac disease and no evidence of helical back to pylori

## 2023-01-09 ENCOUNTER — OFFICE VISIT (OUTPATIENT)
Dept: GASTROENTEROLOGY | Facility: CLINIC | Age: 48
End: 2023-01-09

## 2023-01-09 VITALS
BODY MASS INDEX: 25 KG/M2 | WEIGHT: 132.4 LBS | OXYGEN SATURATION: 99 % | HEIGHT: 61 IN | DIASTOLIC BLOOD PRESSURE: 70 MMHG | SYSTOLIC BLOOD PRESSURE: 100 MMHG | HEART RATE: 90 BPM

## 2023-01-09 DIAGNOSIS — K58.0 IRRITABLE BOWEL SYNDROME WITH DIARRHEA: Primary | ICD-10-CM

## 2023-01-09 DIAGNOSIS — E78.00 HIGH CHOLESTEROL: ICD-10-CM

## 2023-01-09 DIAGNOSIS — R73.03 PRE-DIABETES: ICD-10-CM

## 2023-01-09 NOTE — PROGRESS NOTES
[facility] - Outpatient Follow-up Note  Holly Daley 52 y o  female MRN: [id number]  Encounter: [id number]          ASSESSMENT AND PLAN:      1  Irritable bowel syndrome with diarrhea  EGD and Colonoscopy with biopsies were negative  She is taking benefiber once daily and DA once daily  She is reviewing and implementing a low FODMAP diet  She is s/p a 14 day Xifaxan course  She uses Dicyclomine PRN with good relief    Overall she is better  She stopped all of her prescription medication after her bowel prep and she notes less intense headaches  She still struggles with depression/anxiety and will f/u with her PCP    She had been on a KETO diet in 2019 and felt well at that time  She notes on recent labs a slightly elevated glucose and cholesterol  She wants to speak with a nutritionist about a healthy diet to follow - referral placed    ______________________________________________________________________    SUBJECTIVE: 55-year-old female with a history of diarrhea predominant irritable bowel syndrome, migraines and depression who presents for follow-up after EGD and colonoscopy  Both the upper scope and the colonoscopy with biopsies of the colon, stomach and duodenum were normal   She reports that after doing her bowel cleanse she stopped most of her prescription medications which she was taking for depression, migraines and acid reflux  She felt they were either not helping or giving her side effects  She has significantly cut back on her Advil use and has only taken it on 3 occasions  She completed a 14-day Xifaxan course in December  She started Benefiber and digestive advantage once daily  She also uses IBgard on an as-needed basis  Overall she is doing very well  She reports that her bowel movements are much more regular  She has rare episodes of gas and bloating with abdominal pain and frequent stools  She uses dicyclomine as needed for this with good relief    She noted on recent blood work that her cholesterol and blood sugar are slightly elevated  This is prompted her to want to significantly improve her diet  She had been on a keto diet in 2019 and admits that her gastrointestinal symptoms as well as her migraine headaches were significantly better at that time  She does not believe this is a sustainable diet and still wants to do a modified version of this  REVIEW OF SYSTEMS IS OTHERWISE NEGATIVE        Historical Information   Past Medical History:   Diagnosis Date   • GERD (gastroesophageal reflux disease)    • IBS (irritable bowel syndrome)    • Migraines    • Varicella      Past Surgical History:   Procedure Laterality Date   • ABDOMINOPLASTY N/A 10/8/2020    Procedure: ABDOMINOPLASTY;  Surgeon: Kavitha Chinchilla MD;  Location: [address] [place] [address];  Service: Plastics   • APPENDECTOMY     • TUBAL LIGATION       Social History   Social History     Substance and Sexual Activity   Alcohol Use Yes    Comment: A couple times a month     Social History     Substance and Sexual Activity   Drug Use Yes   • Types: Marijuana     Social History     Tobacco Use   Smoking Status Former   • Types: Cigarettes   • Quit date: 2014   • Years since quittin 0   Smokeless Tobacco Never     Family History   Problem Relation Age of Onset   • No Known Problems Mother    • Cancer Father    • Cancer Paternal Grandmother    • Ovarian cancer Neg Hx    • Cervical cancer Neg Hx    • Uterine cancer Neg Hx    • Breast cancer Neg Hx        Meds/Allergies       Current Outpatient Medications:   •  dicyclomine (BENTYL) 20 mg tablet  •  ibuprofen (MOTRIN) 200 mg tablet  •  Rimegepant Sulfate (Nurtec) 75 MG TBDP    Allergies   Allergen Reactions   • Prochlorperazine Hives and Other (See Comments)   • Asa [Aspirin] Vomiting   • Ondansetron Anxiety           Objective     Blood pressure 100/70, pulse 90, height 5' 1" (1 549 m), weight 60 1 kg (132 lb 6 4 oz), SpO2 99 %, not currently breastfeeding  Body mass index is 25 02 kg/m²  PHYSICAL EXAM:      General Appearance:   Alert, cooperative, no distress   HEENT:   Normocephalic, atraumatic, anicteric      Neck:  Supple, symmetrical, trachea midline   Lungs:   Clear to auscultation bilaterally; no rales, rhonchi or wheezing; respirations unlabored    Heart[de-identified]   Regular rate and rhythm; no murmur, rub, or gallop  Abdomen:   Soft, non-tender, non-distended; normal bowel sounds; no masses, no organomegaly    Genitalia:   Deferred    Rectal:   Deferred    Extremities:  No cyanosis, clubbing or edema    Pulses:  2+ and symmetric    Skin:  No jaundice, rashes, or lesions    Lymph nodes:  No palpable cervical lymphadenopathy        Lab Results:   No visits with results within 1 Day(s) from this visit  Latest known visit with results is:   Appointment on 12/21/2022   Component Date Value   • Lyme Total Antibodies 12/21/2022 <0 2    • Sed Rate 12/21/2022 1    • CRP 12/21/2022 <3 0    • WBC 12/21/2022 6 71    • RBC 12/21/2022 4 03    • Hemoglobin 12/21/2022 13 5    • Hematocrit 12/21/2022 40 4    • MCV 12/21/2022 100 (H)    • MCH 12/21/2022 33 5    • MCHC 12/21/2022 33 4    • RDW 12/21/2022 11 9    • Platelets 48/27/7817 271    • MPV 12/21/2022 11 2    • Sodium 12/21/2022 143    • Potassium 12/21/2022 4 5    • Chloride 12/21/2022 110 (H)    • CO2 12/21/2022 29    • ANION GAP 12/21/2022 4    • BUN 12/21/2022 17    • Creatinine 12/21/2022 0 74    • Glucose, Fasting 12/21/2022 104 (H)    • Calcium 12/21/2022 9 1    • AST 12/21/2022 11    • ALT 12/21/2022 17    • Alkaline Phosphatase 12/21/2022 44 (L)    • Total Protein 12/21/2022 7 0    • Albumin 12/21/2022 3 8    • Total Bilirubin 12/21/2022 0 37    • eGFR 12/21/2022 97          Radiology Results:   EGD    Result Date: 12/15/2022  Narrative: Table formatting from the original result was not included    [id number] [address] [facility] [id number] [phone] DATE OF SERVICE: 12/15/22 PHYSICIAN(S): Attending: Maximiliano Mcnally DO Fellow: No Staff Documented INDICATION: Nausea POST-OP DIAGNOSIS: See the impression below  PREPROCEDURE: Informed consent was obtained for the procedure, including sedation  Risks of perforation, hemorrhage, adverse drug reaction and aspiration were discussed  The patient was placed in the left lateral decubitus position  Patient was explained about the risks and benefits of the procedure  Risks including but not limited to bleeding, infection, and perforation were explained in detail  Also explained about less than 100% sensitivity with the exam and other alternatives  DETAILS OF PROCEDURE: Patient was taken to the procedure room where a time out was performed to confirm correct patient and correct procedure  The patient underwent monitored anesthesia care, which was administered by an anesthesia professional  The patient's blood pressure, heart rate, level of consciousness, respirations and oxygen were monitored throughout the procedure  The scope was advanced to the second part of the duodenum  Retroflexion was performed in the fundus  Prior to the procedure, the patient's H  Pylori status was unknown  The patient's estimated blood loss was minimal (<5 mL)  The procedure was not difficult  The patient tolerated the procedure well  There were no apparent complications  ANESTHESIA INFORMATION: ASA: II Anesthesia Type: IV Sedation with Anesthesia MEDICATIONS: No administrations occurring from 0824 to 0843 on 12/15/22 FINDINGS: The cricopharynx, upper third of the esophagus, middle third of the esophagus, lower third of the esophagus, GE junction and Z-line appeared normal  Z-line is 39 cm from the incisors  The cardia, fundus of the stomach, body of the stomach, greater curve of the stomach, lesser curve of the stomach, incisura, antrum, prepyloric region and pylorus appeared normal  Performed 4 random biopsies using biopsy forceps to rule out H  pylori  The duodenal bulb and 2nd part of the duodenum appeared normal  SPECIMENS: ID Type Source Tests Collected by Time Destination 1 : error- both stomach and duodenum specimen in one jar Tissue Duodenum TISSUE EXAM Dimple Box DO 12/15/2022  8:35 AM      Impression: Normal esophagogastroduodenoscopy RECOMMENDATION: We will call the patient in 1 to 2 weeks with results of the biopsy   Dimple Box DO [facility]     Colonoscopy    Result Date: 12/15/2022  Narrative: Table formatting from the original result was not included  [id number] [address] [place] [id number] [phone] DATE OF SERVICE: 12/15/22 PHYSICIAN(S): Attending: Dimple Box DO Fellow: No Staff Documented INDICATION: Screening for colon cancer POST-OP DIAGNOSIS: See the impression below  HISTORY: Prior colonoscopy: No prior colonoscopy  BOWEL PREPARATION: Miralax/Dulcolax PREPROCEDURE: Informed consent was obtained for the procedure, including sedation  Risks including but not limited to bleeding, infection, perforation, adverse drug reaction and aspiration were explained in detail  Also explained about less than 100% sensitivity with the exam and other alternatives  The patient was placed in the left lateral decubitus position  DETAILS OF PROCEDURE: Patient was taken to the procedure room where a time out was performed to confirm correct patient and correct procedure  The patient underwent monitored anesthesia care, which was administered by an anesthesia professional  The patient's blood pressure, heart rate, level of consciousness, oxygen and respirations were monitored throughout the procedure  A digital rectal exam was performed  The scope was introduced through the anus and advanced to the cecum  Retroflexion was performed in the rectum  The quality of bowel preparation was evaluated using the [facility] Bowel Preparation Scale with scores of: right colon = 2, transverse colon = 2, left colon = 2   The total BBPS score was 6  Bowel prep was adequate  The patient's estimated blood loss was minimal (<5 mL)  The procedure was not difficult  The patient tolerated the procedure well  There were no apparent complications  ANESTHESIA INFORMATION: ASA: II Anesthesia Type: IV Sedation with Anesthesia MEDICATIONS: No administrations occurring from 0824 to 0857 on 12/15/22 FINDINGS: All observed locations appeared normal, including the cecum, ascending colon, hepatic flexure, transverse colon, splenic flexure, descending colon, sigmoid colon, rectosigmoid and rectum  Performed 12 forceps biopsies to rule out colitis in the ascending colon, transverse colon and sigmoid colon EVENTS: Procedure Events Event Event Time ENDO CECUM REACHED 12/15/2022  8:49 AM ENDO SCOPE OUT TIME 12/15/2022  8:55 AM SPECIMENS: ID Type Source Tests Collected by Time Destination 1 : error- both stomach and duodenum specimen in one jar Tissue Duodenum TISSUE EXAM Raf Ruiz DO 12/15/2022  8:35 AM  2 : ascending, transverse, sigmoid Tissue Colon TISSUE EXAM Raf Ruiz DO 12/15/2022  8:53 AM  EQUIPMENT: Colonoscope -[id number]     Impression: Normal colonoscopy RECOMMENDATION:  Repeat screening colonoscopy in 10 years  We will call the patient in 1 to 2 weeks with results of the biopsies  Raf Ruiz DO [facility] FACP    MRI brain with and without contrast    Result Date: 12/26/2022  Narrative: MRI BRAIN WITH AND WITHOUT CONTRAST INDICATION: G43 019: Migraine without aura, intractable, without status migrainosus  COMPARISON:  None  TECHNIQUE: Multiplanar, multisequence imaging of the brain was performed before and after gadolinium administration  IV Contrast:  5 mL of Gadobutrol injection (SINGLE-DOSE)  IMAGE QUALITY:   Diagnostic  FINDINGS: BRAIN PARENCHYMA:  There is no discrete mass, mass effect or midline shift  There is no intracranial hemorrhage  Normal posterior fossa  Diffusion imaging is unremarkable      Small focus of nonspecific FLAIR signal hyperintensity within the left frontal lobe on series 5, image 14  Postcontrast imaging of the brain demonstrates no abnormal enhancement  VENTRICLES:  Normal for the patient's age  SELLA AND PITUITARY GLAND:  Normal  ORBITS:  Normal  PARANASAL SINUSES:  Normal  VASCULATURE:  Evaluation of the major intracranial vasculature demonstrates appropriate flow voids  CALVARIUM AND SKULL BASE:  Normal  EXTRACRANIAL SOFT TISSUES:  Normal      Impression: No mass effect, acute intracranial hemorrhage or evidence of recent infarction  No abnormal parenchymal or leptomeningeal enhancement identified  Nonspecific focus of FLAIR signal hyperintensity within the left frontal lobe can be seen in the setting of migraines  Workstation performed: [id number]   Answers for HPI/ROS submitted by the patient on 1/6/2023  Chronicity: chronic  Onset: more than 1 year ago  Onset quality: sudden  Frequency: every several days  Episode duration: 1 Hours  Progression since onset: rapidly improving  Pain location: suprapubic region  Pain - numeric: 8/10  Pain quality: cramping  Radiates to: does not radiate  anorexia: No  arthralgias: No  belching: No  constipation: No  diarrhea: Yes  dysuria: No  fever: No  flatus: No  frequency: No  headaches: Yes  hematochezia: No  hematuria: No  melena: No  myalgias: No  nausea:  No  weight loss: No  vomiting: No  Aggravated by: eating  Relieved by: bowel movements  Diagnostic workup: lower endoscopy, upper endoscopy

## 2023-05-02 ENCOUNTER — CLINICAL SUPPORT (OUTPATIENT)
Dept: NUTRITION | Facility: HOSPITAL | Age: 48
End: 2023-05-02

## 2023-05-02 VITALS — WEIGHT: 123.68 LBS | BODY MASS INDEX: 23.37 KG/M2

## 2023-05-02 DIAGNOSIS — K58.0 IRRITABLE BOWEL SYNDROME WITH DIARRHEA: ICD-10-CM

## 2023-05-02 DIAGNOSIS — E78.00 HIGH CHOLESTEROL: ICD-10-CM

## 2023-05-02 DIAGNOSIS — R73.03 PRE-DIABETES: ICD-10-CM

## 2023-05-02 NOTE — PROGRESS NOTES
" Nutrition Assessment Form    Patient Name: Bon Aguila    YOB: 1975    Sex: Female     Assessment Date: 5/2/2023  Start Time: 10:00 Stop Time: 11:05 Total Minutes: 72     Data:  Present at session: self   Parent/Patient Concerns/reason for visit: PT concerned with weight loss and lack of appetite along with her issues with IBS-D and migraines   Medical Dx/Reason for Referral: K58 0 IBS-D, E78 00 high cholesterol, R73 03 Pre-diabetes   Past Medical History:   Diagnosis Date    GERD (gastroesophageal reflux disease)     IBS (irritable bowel syndrome)     Migraines     Varicella        Current Outpatient Medications   Medication Sig Dispense Refill    dicyclomine (BENTYL) 20 mg tablet Take 1 tablet (20 mg total) by mouth every 6 (six) hours as needed (abdominal pain) 45 tablet 2    ibuprofen (MOTRIN) 200 mg tablet Take by mouth every 6 (six) hours as needed for mild pain      Rimegepant Sulfate (Nurtec) 75 MG TBDP 1 tablet p o  p r n  headache maximum once a day maximum twice a week 10 tablet 4     No current facility-administered medications for this visit  Additional Meds/Supplements: PT states supplements for the IBS (digestive advantage and IB guard)   Special Learning Needs/barriers to learning/any new barriers    Height: HC Readings from Last 5 Encounters:   No data found for [facility]       Weight: Wt Readings from Last 10 Encounters:   05/02/23 56 1 kg (123 lb 10 9 oz)   01/09/23 60 1 kg (132 lb 6 4 oz)   12/15/22 56 7 kg (125 lb)   09/29/22 60 1 kg (132 lb 6 4 oz)   08/16/22 58 8 kg (129 lb 9 6 oz)   08/10/22 58 kg (127 lb 12 8 oz)   07/26/22 58 5 kg (129 lb)   07/19/22 58 5 kg (129 lb)   07/15/22 59 4 kg (131 lb)   07/07/22 59 6 kg (131 lb 6 4 oz)     Estimated body mass index is 23 37 kg/m² as calculated from the following:    Height as of 1/9/23: 5' 1\" (1 549 m)  Weight as of this encounter: 56 1 kg (123 lb 10 9 oz)     Recent Weight Change: [x]Yes     []No  Amount: Lost 9 " lbs in 4 months      Energy Needs: No calculation needed   Allergies   Allergen Reactions    Prochlorperazine Hives and Other (See Comments)    Asa [Aspirin] Vomiting    Ondansetron Anxiety    or intolerances    Social History     Substance and Sexual Activity   Alcohol Use Yes    Comment: A couple times a month       Social History     Tobacco Use   Smoking Status Former    Types: Cigarettes    Quit date: 2014    Years since quittin 3   Smokeless Tobacco Never       Who shops? 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  She has also recently had a lot of stress in her life as well  PT has been working with her doctor and a therapist to get her medications adjusted and to work through some of her stressors  She has also taken to meditation and listening to books on tape to try to relax  PT also suffers from IBS-D and migraines both of which have a food component  She doesn't drink a lot of caffeine/chocolate as they can trigger a migraine  PT chooses very lean beef, chicken and turkey as too much fat effects her IBS  PT has lost about 6 lbs in a very short amount of time and she is concerned about this    She is also concerned about unhealthy eating habits  Discussed what would be the goals that she would like to accomplish  Suggested that she look to change two things rather than trying to rework her entire eating habits right now  She had mentioned that she was concerned that she was not getting enough protein in the day and had picked up some supplements  PT also doesn't always feel hungry and gets full fast   PT feels that eating breakfast is important to get through the day and also eating enough protein leading into a good dinner  Provided handout on breakfasts  PT states that there are some items on the list which doesn't work with her IBS and migraines so discussed picking 2 different breakfasts that she can switch between that are not hard to make but will be easy to have in the house  Also provided a handout on snacks for ideas that she can incorporate into her day  Also suggested that she can use the supplements as between meal snacks such as drinking 1/2 a bottle between breakfast and lunch and another 1/2 between lunch and dinner  Also recommended that she choose protein with each meal/snack  Encouraged her to find a therapist that she works well with that can help her come up with solutions to deal with current level of stress        Updated assessment (Follow up note only):           Nutrition Diagnosis:   Altered gastrointestinal function  related to Changes in the GI tract motility (i e  gastroparesis) as evidenced by  Conditions associated with a diagnosis or treatment       Any change or new dx since previous visit:     Nutrition Diagnosis:         Medical Nutrition Therapy Intervention:  []Individualized Meal Plan []Understanding Lab Values   []Basic Pathophysiology of Disease []Food/Medication Interactions   []Food Diary []Exercise   [x]Lifestyle/Behavior Modification Techniques: eat breakfast daily, eat 5-6 small meals per day []Medication, Mechanism of Action   []Label Reading: CHO/ Na/ Fat/ other_________ []Self Blood Glucose Monitoring   []Weight/BMI Goals: gain/lose/maintain []Other -           Comprehension: []Excellent  []Very Good  [x]Good  []Fair   []Poor    Receptivity: []Excellent  []Very Good  [x]Good  []Fair   []Poor    Expected Compliance: []Excellent  []Very Good  [x]Good  []Fair   []Poor        Goals (initial)/ Progress made on previous goals/new goals:  1  Eat breakfast daily (choose 2 meals from list provided and have on hand for easy preparation)   2   5-6 small meals per day (finding snacks that are tolerated that can be used between meals to increase intake)   3  Have a protein source with every snack/meal       No follow-ups on file    Labs:  CMP  Lab Results   Component Value Date    K 4 5 12/21/2022     (H) 12/21/2022    CO2 29 12/21/2022    BUN 17 12/21/2022    CREATININE 0 74 12/21/2022    GLUF 104 (H) 12/21/2022    CALCIUM 9 1 12/21/2022    CORRECTEDCA 9 7 07/26/2021    AST 11 12/21/2022    ALT 17 12/21/2022    ALKPHOS 44 (L) 12/21/2022    EGFR 97 12/21/2022       BMP  Lab Results   Component Value Date    CALCIUM 9 1 12/21/2022    K 4 5 12/21/2022    CO2 29 12/21/2022     (H) 12/21/2022    BUN 17 12/21/2022    CREATININE 0 74 12/21/2022       Lipids  No results found for: CHOL  Lab Results   Component Value Date    HDL 59 07/07/2022    HDL 47 07/26/2021     Lab Results   Component Value Date    LDLCALC 142 (H) 07/07/2022    LDLCALC 132 (H) 07/26/2021     Lab Results   Component Value Date    TRIG 132 07/07/2022    TRIG 134 07/26/2021     No results found for: CHOLHDL    Hemoglobin A1C  No results found for: HGBA1C    Fasting Glucose  Lab Results   Component Value Date    GLUF 104 (H) 12/21/2022       Insulin     Thyroid  No results found for: TSH, Q5ZTAMU, P3BMETE, THYROIDAB    Hepatic Function Panel  Lab Results   Component Value Date    ALT 17 12/21/2022    AST 11 12/21/2022    ALKPHOS 44 (L) 12/21/2022       Celiac Disease Antibody Panel  No results found for: ENDOMYSIAL IGA, GLIADIN IGA, GLIADIN IGG, IGA, TISSUE TRANSGLUT AB, TTG IGA   Iron  Lab Results   Component Value Date    IRON 94 07/07/2022    TIBC 321 07/07/2022    FERRITIN 22 07/07/2022            Efra Lopes, [address] [place] [address]  [address]  [place] [phone]

## 2023-05-04 ENCOUNTER — HOSPITAL ENCOUNTER (EMERGENCY)
Facility: HOSPITAL | Age: 48
Discharge: HOME | End: 2023-05-04
Attending: EMERGENCY MEDICINE

## 2023-05-04 VITALS
SYSTOLIC BLOOD PRESSURE: 135 MMHG | DIASTOLIC BLOOD PRESSURE: 78 MMHG | WEIGHT: 123 LBS | BODY MASS INDEX: 23.24 KG/M2 | RESPIRATION RATE: 18 BRPM | OXYGEN SATURATION: 98 % | HEART RATE: 85 BPM

## 2023-05-04 DIAGNOSIS — G43.909 MIGRAINE: ICD-10-CM

## 2023-05-04 DIAGNOSIS — M62.838 MUSCLE SPASM: Primary | ICD-10-CM

## 2023-05-04 LAB
ANION GAP SERPL CALCULATED.3IONS-SCNC: 6 MMOL/L (ref 4–13)
BASOPHILS # BLD AUTO: 0.02 THOUSANDS/ÂΜL (ref 0–0.1)
BASOPHILS NFR BLD AUTO: 0 % (ref 0–1)
BUN SERPL-MCNC: 15 MG/DL (ref 5–25)
CALCIUM SERPL-MCNC: 9.5 MG/DL (ref 8.4–10.2)
CHLORIDE SERPL-SCNC: 105 MMOL/L (ref 96–108)
CO2 SERPL-SCNC: 27 MMOL/L (ref 21–32)
CREAT SERPL-MCNC: 0.75 MG/DL (ref 0.6–1.3)
EOSINOPHIL # BLD AUTO: 0.08 THOUSAND/ÂΜL (ref 0–0.61)
EOSINOPHIL NFR BLD AUTO: 1 % (ref 0–6)
ERYTHROCYTE [DISTWIDTH] IN BLOOD BY AUTOMATED COUNT: 11.9 % (ref 11.6–15.1)
GFR SERPL CREATININE-BSD FRML MDRD: 95 ML/MIN/1.73SQ M
GLUCOSE SERPL-MCNC: 127 MG/DL (ref 65–140)
HCT VFR BLD AUTO: 40.4 % (ref 34.8–46.1)
HGB BLD-MCNC: 13.9 G/DL (ref 11.5–15.4)
IMM GRANULOCYTES # BLD AUTO: 0.05 THOUSAND/UL (ref 0–0.2)
IMM GRANULOCYTES NFR BLD AUTO: 1 % (ref 0–2)
LYMPHOCYTES # BLD AUTO: 1.83 THOUSANDS/ÂΜL (ref 0.6–4.47)
LYMPHOCYTES NFR BLD AUTO: 18 % (ref 14–44)
MCH RBC QN AUTO: 33.6 PG (ref 26.8–34.3)
MCHC RBC AUTO-ENTMCNC: 34.4 G/DL (ref 31.4–37.4)
MCV RBC AUTO: 98 FL (ref 82–98)
MONOCYTES # BLD AUTO: 0.48 THOUSAND/ÂΜL (ref 0.17–1.22)
MONOCYTES NFR BLD AUTO: 5 % (ref 4–12)
NEUTROPHILS # BLD AUTO: 7.74 THOUSANDS/ÂΜL (ref 1.85–7.62)
NEUTS SEG NFR BLD AUTO: 75 % (ref 43–75)
NRBC BLD AUTO-RTO: 0 /100 WBCS
PLATELET # BLD AUTO: 327 THOUSANDS/UL (ref 149–390)
PMV BLD AUTO: 11 FL (ref 8.9–12.7)
POTASSIUM SERPL-SCNC: 3.8 MMOL/L (ref 3.5–5.3)
RBC # BLD AUTO: 4.14 MILLION/UL (ref 3.81–5.12)
SODIUM SERPL-SCNC: 138 MMOL/L (ref 135–147)
WBC # BLD AUTO: 10.2 THOUSAND/UL (ref 4.31–10.16)

## 2023-05-04 RX ORDER — METOCLOPRAMIDE 10 MG/1
10 TABLET ORAL EVERY 6 HOURS
Qty: 30 TABLET | Refills: 0 | Status: SHIPPED | OUTPATIENT
Start: 2023-05-04

## 2023-05-04 RX ORDER — METHOCARBAMOL 500 MG/1
500 TABLET, FILM COATED ORAL 2 TIMES DAILY
Qty: 20 TABLET | Refills: 0 | Status: SHIPPED | OUTPATIENT
Start: 2023-05-04

## 2023-05-04 RX ORDER — DEXAMETHASONE SODIUM PHOSPHATE 4 MG/ML
8 INJECTION, SOLUTION INTRA-ARTICULAR; INTRALESIONAL; INTRAMUSCULAR; INTRAVENOUS; SOFT TISSUE ONCE
Status: COMPLETED | OUTPATIENT
Start: 2023-05-04 | End: 2023-05-04

## 2023-05-04 RX ORDER — FENTANYL CITRATE 50 UG/ML
50 INJECTION, SOLUTION INTRAMUSCULAR; INTRAVENOUS ONCE
Status: COMPLETED | OUTPATIENT
Start: 2023-05-04 | End: 2023-05-04

## 2023-05-04 RX ORDER — DIPHENHYDRAMINE HYDROCHLORIDE 50 MG/ML
50 INJECTION INTRAMUSCULAR; INTRAVENOUS ONCE
Status: COMPLETED | OUTPATIENT
Start: 2023-05-04 | End: 2023-05-04

## 2023-05-04 RX ORDER — PREDNISONE 50 MG/1
50 TABLET ORAL DAILY
Qty: 5 TABLET | Refills: 0 | Status: SHIPPED | OUTPATIENT
Start: 2023-05-04 | End: 2023-05-09

## 2023-05-04 RX ORDER — METOCLOPRAMIDE HYDROCHLORIDE 5 MG/ML
10 INJECTION INTRAMUSCULAR; INTRAVENOUS ONCE
Status: COMPLETED | OUTPATIENT
Start: 2023-05-04 | End: 2023-05-04

## 2023-05-04 RX ADMIN — DEXAMETHASONE SODIUM PHOSPHATE 8 MG: 4 INJECTION, SOLUTION INTRAMUSCULAR; INTRAVENOUS at 07:28

## 2023-05-04 RX ADMIN — DIPHENHYDRAMINE HYDROCHLORIDE 50 MG: 50 INJECTION, SOLUTION INTRAMUSCULAR; INTRAVENOUS at 07:28

## 2023-05-04 RX ADMIN — METOCLOPRAMIDE 10 MG: 5 INJECTION, SOLUTION INTRAMUSCULAR; INTRAVENOUS at 07:28

## 2023-05-04 RX ADMIN — FENTANYL CITRATE 50 MCG: 50 INJECTION, SOLUTION INTRAMUSCULAR; INTRAVENOUS at 08:24

## 2023-05-04 RX ADMIN — SODIUM CHLORIDE 1000 ML: 0.9 INJECTION, SOLUTION INTRAVENOUS at 07:28

## 2023-05-04 NOTE — DISCHARGE INSTRUCTIONS
A  personal message from Dr Jonathan Cadena,  Thank you so much for allowing me to care for you today  I pride myself in the care and attention I give all my patients  I hope you were a witness to this tonight  If for any reason your condition does not improve or worsens, or you have a question that was not answered during your visit you can feel free to text me on my personal phone #  # [phone]  I will answer to your message and continue your care past your emergency room visit  Please understand that although you are being discharged because your condition has been deemed stable and able to be managed on an outpatient setting  However your condition may worsen as part of the natural progression of the illness/condition, if this occurs please come back to the emergency department for a repeat evaluation

## 2023-05-04 NOTE — ED PROVIDER NOTES
History  Chief Complaint   Patient presents with    Neck Pain     Started with migraine/neck pain three days ago, nothing is helping  Very nauseous and in a lot of pain       This is a 80-year-old female with prior history of migraine headaches  She presents emergency department today with a headache that does not feel like a migraine but its been going on for last 4 days  It starts in her right side neck with some tension and tenderness and radiates up into her head  Associated nausea with some retching  No diarrhea no fever no chills  This particular headache started  its been on and off since  Patient has seen neurology in the past and had an MRI which I have reviewed  It did not show any acute pathology other than some changes consistent with migraine headache  This was done back in December of last year  Patient has no significant past medical history other than a episode of diverticulitis  Surgical history includes appendectomy and bilateral tubal ligation  Patient does not smoke or drink            History provided by:  Patient   used: No        Prior to Admission Medications   Prescriptions Last Dose Informant Patient Reported? Taking?    Rimegepant Sulfate (Nurtec) 75 MG TBDP   No No   Si tablet p o  p r n  headache maximum once a day maximum twice a week   dicyclomine (BENTYL) 20 mg tablet   No No   Sig: Take 1 tablet (20 mg total) by mouth every 6 (six) hours as needed (abdominal pain)   ibuprofen (MOTRIN) 200 mg tablet   Yes No   Sig: Take by mouth every 6 (six) hours as needed for mild pain      Facility-Administered Medications: None       Past Medical History:   Diagnosis Date    GERD (gastroesophageal reflux disease)     IBS (irritable bowel syndrome)     Migraines     Varicella        Past Surgical History:   Procedure Laterality Date    ABDOMINOPLASTY N/A 10/8/2020    Procedure: ABDOMINOPLASTY;  Surgeon: All Willoughby MD;  Location:  MAIN OR; Service: Plastics    APPENDECTOMY      TUBAL LIGATION         Family History   Problem Relation Age of Onset    No Known Problems Mother     Cancer Father     Cancer Paternal Grandmother     Ovarian cancer Neg Hx     Cervical cancer Neg Hx     Uterine cancer Neg Hx     Breast cancer Neg Hx      I have reviewed and agree with the history as documented  E-Cigarette/Vaping    E-Cigarette Use Never User      E-Cigarette/Vaping Substances    Nicotine No     THC No     CBD No     Flavoring No     Other No     Unknown No      Social History     Tobacco Use    Smoking status: Former     Types: Cigarettes     Quit date: 2014     Years since quittin 3    Smokeless tobacco: Never   Vaping Use    Vaping Use: Never used   Substance Use Topics    Alcohol use: Yes     Comment: A couple times a month    Drug use: Yes     Types: Marijuana       Review of Systems   Constitutional: Negative for chills and fever  HENT: Negative for ear pain and sore throat  Eyes: Negative for pain and visual disturbance  Respiratory: Negative for cough and shortness of breath  Cardiovascular: Negative for chest pain and palpitations  Gastrointestinal: Negative for abdominal pain and vomiting  Genitourinary: Negative for dysuria and hematuria  Musculoskeletal: Positive for neck pain  Negative for arthralgias, back pain and neck stiffness  Skin: Negative for color change and rash  Neurological: Positive for headaches  Negative for seizures and syncope  All other systems reviewed and are negative  Physical Exam  Physical Exam  Vitals and nursing note reviewed  Constitutional:       General: She is not in acute distress  Appearance: She is well-developed  HENT:      Head: Normocephalic and atraumatic        Right Ear: External ear normal       Left Ear: External ear normal       Mouth/Throat:      Mouth: Mucous membranes are moist    Eyes:      Extraocular Movements: Extraocular movements intact  Conjunctiva/sclera: Conjunctivae normal       Pupils: Pupils are equal, round, and reactive to light  Cardiovascular:      Rate and Rhythm: Normal rate and regular rhythm  Heart sounds: No murmur heard  Pulmonary:      Effort: Pulmonary effort is normal  No respiratory distress  Breath sounds: Normal breath sounds  Abdominal:      General: Abdomen is flat  Palpations: Abdomen is soft  Tenderness: There is no abdominal tenderness  Musculoskeletal:         General: No swelling  Cervical back: Neck supple  Tenderness (R PARASPINAL MUSCLE) present  No rigidity  Skin:     General: Skin is warm and dry  Capillary Refill: Capillary refill takes less than 2 seconds  Neurological:      General: No focal deficit present  Mental Status: She is alert and oriented to person, place, and time  Psychiatric:         Mood and Affect: Mood normal          Thought Content:  Thought content normal          Judgment: Judgment normal          Vital Signs  ED Triage Vitals   Temp Pulse Respirations Blood Pressure SpO2   -- 05/04/23 0702 05/04/23 0702 05/04/23 0702 05/04/23 0702    95 16 145/79 98 %      Temp Source Heart Rate Source Patient Position - Orthostatic VS BP Location FiO2 (%)   05/04/23 0702 05/04/23 0702 05/04/23 0702 05/04/23 0702 --   Oral Monitor Sitting Left arm       Pain Score       05/04/23 0824       8           Vitals:    05/04/23 0702 05/04/23 0840   BP: 145/79 135/78   Pulse: 95 85   Patient Position - Orthostatic VS: Sitting Sitting         Visual Acuity      ED Medications  Medications   sodium chloride 0 9 % bolus 1,000 mL (0 mL Intravenous Stopped 5/4/23 0843)   metoclopramide (REGLAN) injection 10 mg (10 mg Intravenous Given 5/4/23 0728)   diphenhydrAMINE (BENADRYL) injection 50 mg (50 mg Intravenous Given 5/4/23 0728)   dexamethasone (DECADRON) injection 8 mg (8 mg Intravenous Given 5/4/23 0728)   fentanyl citrate (PF) 100 MCG/2ML 50 mcg (50 mcg Intravenous Given 5/4/23 0824)       Diagnostic Studies  Results Reviewed     Procedure Component Value Units Date/Time    Basic metabolic panel [[id number]] Collected: 05/04/23 0727    Lab Status: Final result Specimen: Blood from Arm, Right Updated: 05/04/23 0805     Sodium 138 mmol/L      Potassium 3 8 mmol/L      Chloride 105 mmol/L      CO2 27 mmol/L      ANION GAP 6 mmol/L      BUN 15 mg/dL      Creatinine 0 75 mg/dL      Glucose 127 mg/dL      Calcium 9 5 mg/dL      eGFR 95 ml/min/1 73sq m     Narrative:      [place] guidelines for Chronic Kidney Disease (CKD):     Stage 1 with normal or high GFR (GFR > 90 mL/min/1 73 square meters)    Stage 2 Mild CKD (GFR = 60-89 mL/min/1 73 square meters)    Stage 3A Moderate CKD (GFR = 45-59 mL/min/1 73 square meters)    Stage 3B Moderate CKD (GFR = 30-44 mL/min/1 73 square meters)    Stage 4 Severe CKD (GFR = 15-29 mL/min/1 73 square meters)    Stage 5 End Stage CKD (GFR <15 mL/min/1 73 square meters)  Note: GFR calculation is accurate only with a steady state creatinine    CBC and differential [[id number]]  (Abnormal) Collected: 05/04/23 0727    Lab Status: Final result Specimen: Blood from Arm, Right Updated: 05/04/23 0739     WBC 10 20 Thousand/uL      RBC 4 14 Million/uL      Hemoglobin 13 9 g/dL      Hematocrit 40 4 %      MCV 98 fL      MCH 33 6 pg      MCHC 34 4 g/dL      RDW 11 9 %      MPV 11 0 fL      Platelets 879 Thousands/uL      nRBC 0 /100 WBCs      Neutrophils Relative 75 %      Immat GRANS % 1 %      Lymphocytes Relative 18 %      Monocytes Relative 5 %      Eosinophils Relative 1 %      Basophils Relative 0 %      Neutrophils Absolute 7 74 Thousands/µL      Immature Grans Absolute 0 05 Thousand/uL      Lymphocytes Absolute 1 83 Thousands/µL      Monocytes Absolute 0 48 Thousand/µL      Eosinophils Absolute 0 08 Thousand/µL      Basophils Absolute 0 02 Thousands/µL                  No orders to display Procedures  Procedures         ED Course  ED Course as of 05/07/23 2155   Thu May 04, 2023   0758 WBC(!): 10 20   0758 Hemoglobin: 13 9   0758 HCT: 40 4   0758 Platelet Count: 803   0810 Sodium: 138   0810 Potassium: 3 8   0810 BUN: 15   0810 Creatinine: 0 75                               SBIRT 20yo+    Flowsheet Row Most Recent Value   Initial Alcohol Screen:  AUDIT-C     1  How often do you have a drink containing alcohol? 0 Filed at: 05/04/2023 0705   2  How many drinks containing alcohol do you have on a typical day you are drinking? 0 Filed at: 05/04/2023 0705   3b  FEMALE Any Age, or MALE 65+: How often do you have 4 or more drinks on one occassion? 0 Filed at: 05/04/2023 0705   Audit-C Score 0 Filed at: 05/04/2023 9412   TYESHA: How many times in the past year have you    Used an illegal drug or used a prescription medication for non-medical reasons? Never Filed at: 05/04/2023 0705                    Medical Decision Making  Laboratory results revealed a   WBC   Normal  HH   Normal  PLT   Normal  Kidney Function  Normal  Electrolytes  Normal         Migraine: acute illness or injury  Muscle spasm: acute illness or injury  Amount and/or Complexity of Data Reviewed  Labs: ordered  Decision-making details documented in ED Course  Discussion of management or test interpretation with external provider(s): Patient improved with medications in the emergency department  She was given prescriptions to manage similar headaches in the future  She has follow-up appointments with neurology later this year  Risk  OTC drugs  Prescription drug management            Disposition  Final diagnoses:   Muscle spasm   Migraine     Time reflects when diagnosis was documented in both MDM as applicable and the Disposition within this note     Time User Action Codes Description Comment    5/4/2023  8:09 AM Oliver Nugent [[id number]] Muscle spasm     5/4/2023  8:09 AM Oliver Nugent [[id number]] Migraine       ED Disposition     ED Disposition   Discharge    Condition   Stable    Date/Time   Thu May 4, 2023  8:09 AM    Comment   Jv Rico discharge to home/self care  Follow-up Information     Follow up With Specialties Details Why [id number], [address]  Nurse Practitioner, Family Medicine In 1 week If symptoms worsen [id number]  [phone]            Discharge Medication List as of 5/4/2023  8:11 AM      START taking these medications    Details   methocarbamol (ROBAXIN) 500 mg tablet Take 1 tablet (500 mg total) by mouth 2 (two) times a day, Starting Thu 5/4/2023, Normal      metoclopramide (Reglan) 10 mg tablet Take 1 tablet (10 mg total) by mouth every 6 (six) hours, Starting Thu 5/4/2023, Normal      predniSONE 50 mg tablet Take 1 tablet (50 mg total) by mouth daily for 5 days, Starting Thu 5/4/2023, Until Tue 5/9/2023, Normal         CONTINUE these medications which have NOT CHANGED    Details   dicyclomine (BENTYL) 20 mg tablet Take 1 tablet (20 mg total) by mouth every 6 (six) hours as needed (abdominal pain), Starting Thu 9/29/2022, Normal      ibuprofen (MOTRIN) 200 mg tablet Take by mouth every 6 (six) hours as needed for mild pain, Historical Med      Rimegepant Sulfate (Nurtec) 75 MG TBDP 1 tablet p o  p r n  headache maximum once a day maximum twice a week, Normal             No discharge procedures on file      PDMP Review     None          ED Provider  Electronically Signed by           Salome Flood MD  05/07/23 7825

## 2023-06-06 ENCOUNTER — APPOINTMENT (EMERGENCY)
Dept: CT IMAGING | Facility: HOSPITAL | Age: 48
End: 2023-06-06
Payer: COMMERCIAL

## 2023-06-06 ENCOUNTER — HOSPITAL ENCOUNTER (EMERGENCY)
Facility: HOSPITAL | Age: 48
Discharge: HOME | End: 2023-06-06
Attending: EMERGENCY MEDICINE
Payer: COMMERCIAL

## 2023-06-06 VITALS
WEIGHT: 121.03 LBS | BODY MASS INDEX: 22.87 KG/M2 | DIASTOLIC BLOOD PRESSURE: 80 MMHG | HEART RATE: 70 BPM | TEMPERATURE: 98 F | SYSTOLIC BLOOD PRESSURE: 120 MMHG | RESPIRATION RATE: 18 BRPM | OXYGEN SATURATION: 100 %

## 2023-06-06 DIAGNOSIS — K57.92 DIVERTICULITIS: Primary | ICD-10-CM

## 2023-06-06 LAB
ALBUMIN SERPL BCP-MCNC: 5 G/DL (ref 3.5–5)
ALP SERPL-CCNC: 43 U/L (ref 34–104)
ALT SERPL W P-5'-P-CCNC: 10 U/L (ref 7–52)
ANION GAP SERPL CALCULATED.3IONS-SCNC: 9 MMOL/L (ref 4–13)
AST SERPL W P-5'-P-CCNC: 12 U/L (ref 13–39)
BACTERIA UR QL AUTO: ABNORMAL /HPF
BASOPHILS # BLD AUTO: 0.03 THOUSANDS/ÂΜL (ref 0–0.1)
BASOPHILS NFR BLD AUTO: 0 % (ref 0–1)
BILIRUB SERPL-MCNC: 0.93 MG/DL (ref 0.2–1)
BILIRUB UR QL STRIP: NEGATIVE
BUN SERPL-MCNC: 14 MG/DL (ref 5–25)
CALCIUM SERPL-MCNC: 10 MG/DL (ref 8.4–10.2)
CHLORIDE SERPL-SCNC: 104 MMOL/L (ref 96–108)
CLARITY UR: CLEAR
CO2 SERPL-SCNC: 26 MMOL/L (ref 21–32)
COLOR UR: YELLOW
CREAT SERPL-MCNC: 0.74 MG/DL (ref 0.6–1.3)
EOSINOPHIL # BLD AUTO: 0.03 THOUSAND/ÂΜL (ref 0–0.61)
EOSINOPHIL NFR BLD AUTO: 0 % (ref 0–6)
ERYTHROCYTE [DISTWIDTH] IN BLOOD BY AUTOMATED COUNT: 11.4 % (ref 11.6–15.1)
EXT PREGNANCY TEST URINE: NEGATIVE
EXT. CONTROL: NORMAL
GFR SERPL CREATININE-BSD FRML MDRD: 96 ML/MIN/1.73SQ M
GLUCOSE SERPL-MCNC: 110 MG/DL (ref 65–140)
GLUCOSE UR STRIP-MCNC: NEGATIVE MG/DL
HCT VFR BLD AUTO: 42.4 % (ref 34.8–46.1)
HGB BLD-MCNC: 14.8 G/DL (ref 11.5–15.4)
HGB UR QL STRIP.AUTO: ABNORMAL
IMM GRANULOCYTES # BLD AUTO: 0.05 THOUSAND/UL (ref 0–0.2)
IMM GRANULOCYTES NFR BLD AUTO: 0 % (ref 0–2)
KETONES UR STRIP-MCNC: ABNORMAL MG/DL
LEUKOCYTE ESTERASE UR QL STRIP: NEGATIVE
LIPASE SERPL-CCNC: <6 U/L (ref 11–82)
LYMPHOCYTES # BLD AUTO: 2.97 THOUSANDS/ÂΜL (ref 0.6–4.47)
LYMPHOCYTES NFR BLD AUTO: 21 % (ref 14–44)
MCH RBC QN AUTO: 33.9 PG (ref 26.8–34.3)
MCHC RBC AUTO-ENTMCNC: 34.9 G/DL (ref 31.4–37.4)
MCV RBC AUTO: 97 FL (ref 82–98)
MONOCYTES # BLD AUTO: 0.98 THOUSAND/ÂΜL (ref 0.17–1.22)
MONOCYTES NFR BLD AUTO: 7 % (ref 4–12)
MUCOUS THREADS UR QL AUTO: ABNORMAL
NEUTROPHILS # BLD AUTO: 10.38 THOUSANDS/ÂΜL (ref 1.85–7.62)
NEUTS SEG NFR BLD AUTO: 72 % (ref 43–75)
NITRITE UR QL STRIP: NEGATIVE
NON-SQ EPI CELLS URNS QL MICRO: ABNORMAL /HPF
NRBC BLD AUTO-RTO: 0 /100 WBCS
PH UR STRIP.AUTO: 5.5 [PH]
PLATELET # BLD AUTO: 304 THOUSANDS/UL (ref 149–390)
PMV BLD AUTO: 10.8 FL (ref 8.9–12.7)
POTASSIUM SERPL-SCNC: 3.3 MMOL/L (ref 3.5–5.3)
PROT SERPL-MCNC: 7.8 G/DL (ref 6.4–8.4)
PROT UR STRIP-MCNC: ABNORMAL MG/DL
RBC # BLD AUTO: 4.36 MILLION/UL (ref 3.81–5.12)
RBC #/AREA URNS AUTO: ABNORMAL /HPF
SODIUM SERPL-SCNC: 139 MMOL/L (ref 135–147)
SP GR UR STRIP.AUTO: 1.02 (ref 1–1.03)
UROBILINOGEN UR STRIP-ACNC: <2 MG/DL
WBC # BLD AUTO: 14.44 THOUSAND/UL (ref 4.31–10.16)
WBC #/AREA URNS AUTO: ABNORMAL /HPF

## 2023-06-06 PROCEDURE — 81001 URINALYSIS AUTO W/SCOPE: CPT | Performed by: EMERGENCY MEDICINE

## 2023-06-06 PROCEDURE — 74177 CT ABD & PELVIS W/CONTRAST: CPT

## 2023-06-06 PROCEDURE — 81025 URINE PREGNANCY TEST: CPT | Performed by: EMERGENCY MEDICINE

## 2023-06-06 PROCEDURE — 80053 COMPREHEN METABOLIC PANEL: CPT | Performed by: EMERGENCY MEDICINE

## 2023-06-06 PROCEDURE — 83690 ASSAY OF LIPASE: CPT | Performed by: EMERGENCY MEDICINE

## 2023-06-06 PROCEDURE — 85025 COMPLETE CBC W/AUTO DIFF WBC: CPT | Performed by: EMERGENCY MEDICINE

## 2023-06-06 PROCEDURE — 36415 COLL VENOUS BLD VENIPUNCTURE: CPT

## 2023-06-06 RX ORDER — AMOXICILLIN AND CLAVULANATE POTASSIUM 875; 125 MG/1; MG/1
1 TABLET, FILM COATED ORAL ONCE
Status: COMPLETED | OUTPATIENT
Start: 2023-06-06 | End: 2023-06-06

## 2023-06-06 RX ORDER — DICYCLOMINE HCL 20 MG
20 TABLET ORAL 2 TIMES DAILY
Qty: 20 TABLET | Refills: 0 | Status: SHIPPED | OUTPATIENT
Start: 2023-06-06 | End: 2023-06-08

## 2023-06-06 RX ORDER — SODIUM CHLORIDE, SODIUM GLUCONATE, SODIUM ACETATE, POTASSIUM CHLORIDE, MAGNESIUM CHLORIDE, SODIUM PHOSPHATE, DIBASIC, AND POTASSIUM PHOSPHATE .53; .5; .37; .037; .03; .012; .00082 G/100ML; G/100ML; G/100ML; G/100ML; G/100ML; G/100ML; G/100ML
1000 INJECTION, SOLUTION INTRAVENOUS ONCE
Status: COMPLETED | OUTPATIENT
Start: 2023-06-06 | End: 2023-06-06

## 2023-06-06 RX ORDER — DICYCLOMINE HCL 20 MG
20 TABLET ORAL ONCE
Status: COMPLETED | OUTPATIENT
Start: 2023-06-06 | End: 2023-06-06

## 2023-06-06 RX ORDER — AMOXICILLIN AND CLAVULANATE POTASSIUM 875; 125 MG/1; MG/1
1 TABLET, FILM COATED ORAL EVERY 12 HOURS
Qty: 20 TABLET | Refills: 0 | Status: SHIPPED | OUTPATIENT
Start: 2023-06-06 | End: 2023-06-16

## 2023-06-06 RX ADMIN — IOHEXOL 100 ML: 350 INJECTION, SOLUTION INTRAVENOUS at 13:41

## 2023-06-06 RX ADMIN — SODIUM CHLORIDE, SODIUM GLUCONATE, SODIUM ACETATE, POTASSIUM CHLORIDE, MAGNESIUM CHLORIDE, SODIUM PHOSPHATE, DIBASIC, AND POTASSIUM PHOSPHATE 1000 ML: .53; .5; .37; .037; .03; .012; .00082 INJECTION, SOLUTION INTRAVENOUS at 13:00

## 2023-06-06 RX ADMIN — AMOXICILLIN AND CLAVULANATE POTASSIUM 1 TABLET: 875; 125 TABLET, FILM COATED ORAL at 15:21

## 2023-06-06 RX ADMIN — DICYCLOMINE HYDROCHLORIDE 20 MG: 20 TABLET ORAL at 13:00

## 2023-06-06 NOTE — ED ATTENDING ATTESTATION
6/6/2023  IAngela MD, saw and evaluated the patient  I have discussed the patient with the resident/non-physician practitioner and agree with the resident's/non-physician practitioner's findings, Plan of Care, and MDM as documented in the resident's/non-physician practitioner's note, except where noted  All available labs and Radiology studies were reviewed  I was present for key portions of any procedure(s) performed by the resident/non-physician practitioner and I was immediately available to provide assistance  At this point I agree with the current assessment done in the Emergency Department  I have conducted an independent evaluation of this patient a history and physical is as follows:    ED Course     53yo female p/w abd pain, felt at first like her IBS, but then didn't approve with diarrhea  Pain was more severe  Didn't improved with bentyl  Has h/o diverticulitis was concerned ws similar  Will get labs including UA/preg, CBC/chem/lipase/CT scan to r/o colitis/diverticulitis  Plan: treat pain/sx      Critical Care Time  Procedures

## 2023-06-06 NOTE — DISCHARGE INSTRUCTIONS
Clear liquid diet, advance as tolerated    Take antibiotics as prescribed  Follow up with GI    Return to ER if symptoms worsen

## 2023-06-06 NOTE — ED PROVIDER NOTES
History  Chief Complaint   Patient presents with   • Abdominal Pain     Lower abd pain, dry heaves and chills, thinks it may be her diverticulitis      49-year-old female presents to the ER for evaluation of abdominal pain  PMH: IBS, diverticulitis, GERD, migraines  Patient stated that 2 days ago she started to have bilateral lower abdominal cramping  Patient has history of IBS and typically has relief of abdominal cramping after she has a bowel movement however the cramping persisted  Patient took a Bentyl and had minimal relief  Patient stated she went out to dinner with her  last night and had tacos  Patient stated that when she got home she laid down and started to have severe lower abdominal pain  She felt that she  had a gas bubble and when she rolled to her side the pain increased and the pain radiated underneath her rib cage  This morning she woke up and had 1 episode of nonbloody, nonbilious vomit  Pain persisted and now is described as aching and tender to touch  No recent travel, suspicious food intake, trauma, sick contacts  Denies urinary symptoms, flank pain or vaginal itching, discharge  History provided by:  Patient      Prior to Admission Medications   Prescriptions Last Dose Informant Patient Reported? Taking?    Rimegepant Sulfate (Nurtec) 75 MG TBDP   No No   Si tablet p o  p r n  headache maximum once a day maximum twice a week   dicyclomine (BENTYL) 20 mg tablet   No No   Sig: Take 1 tablet (20 mg total) by mouth every 6 (six) hours as needed (abdominal pain)   ibuprofen (MOTRIN) 200 mg tablet   Yes No   Sig: Take by mouth every 6 (six) hours as needed for mild pain   methocarbamol (ROBAXIN) 500 mg tablet   No No   Sig: Take 1 tablet (500 mg total) by mouth 2 (two) times a day   metoclopramide (Reglan) 10 mg tablet   No No   Sig: Take 1 tablet (10 mg total) by mouth every 6 (six) hours      Facility-Administered Medications: None       Past Medical History:   Diagnosis Date   • GERD (gastroesophageal reflux disease)    • IBS (irritable bowel syndrome)    • Migraines    • Varicella        Past Surgical History:   Procedure Laterality Date   • ABDOMINOPLASTY N/A 10/8/2020    Procedure: ABDOMINOPLASTY;  Surgeon: Rajesh Santana MD;  Location: [address] [place] [address] OR;  Service: Plastics   • APPENDECTOMY     • TUBAL LIGATION         Family History   Problem Relation Age of Onset   • No Known Problems Mother    • Cancer Father    • Cancer Paternal Grandmother    • Ovarian cancer Neg Hx    • Cervical cancer Neg Hx    • Uterine cancer Neg Hx    • Breast cancer Neg Hx      I have reviewed and agree with the history as documented  E-Cigarette/Vaping   • E-Cigarette Use Never User      E-Cigarette/Vaping Substances   • Nicotine No    • THC No    • CBD No    • Flavoring No    • Other No    • Unknown No      Social History     Tobacco Use   • Smoking status: Former     Types: Cigarettes     Quit date: 2014     Years since quittin 4   • Smokeless tobacco: Never   Vaping Use   • Vaping Use: Never used   Substance Use Topics   • Alcohol use: Yes     Comment: A couple times a month   • Drug use: Yes     Types: Marijuana       Review of Systems   Constitutional: Negative for chills and fever  Respiratory: Negative for cough, chest tightness, shortness of breath and wheezing  Cardiovascular: Negative for chest pain and palpitations  Gastrointestinal: Positive for abdominal pain, nausea and vomiting  Negative for blood in stool and diarrhea  Genitourinary: Negative for dysuria, vaginal bleeding and vaginal discharge  Musculoskeletal: Negative for back pain  Skin: Negative for rash  Neurological: Negative for dizziness, seizures, syncope, weakness, numbness and headaches  All other systems reviewed and are negative  Physical Exam  Physical Exam  Vitals and nursing note reviewed  HENT:      Head: Normocephalic     Eyes:      Extraocular Movements: Extraocular movements intact  Cardiovascular:      Rate and Rhythm: Normal rate and regular rhythm  Heart sounds: Normal heart sounds  No murmur heard  Pulmonary:      Effort: Pulmonary effort is normal  No respiratory distress  Breath sounds: Normal breath sounds  Abdominal:      General: Abdomen is flat  Bowel sounds are normal       Palpations: Abdomen is soft  Tenderness: There is abdominal tenderness in the right lower quadrant and left lower quadrant  There is no right CVA tenderness or left CVA tenderness  Skin:     General: Skin is warm  Capillary Refill: Capillary refill takes less than 2 seconds  Neurological:      General: No focal deficit present  Mental Status: She is alert and oriented to person, place, and time     Psychiatric:         Mood and Affect: Mood normal          Vital Signs  ED Triage Vitals   Temperature Pulse Respirations Blood Pressure SpO2   06/06/23 1222 06/06/23 1222 06/06/23 1222 06/06/23 1222 06/06/23 1222   (!) 97 4 °F (36 3 °C) (!) 120 18 (!) 175/72 99 %      Temp Source Heart Rate Source Patient Position - Orthostatic VS BP Location FiO2 (%)   06/06/23 1524 06/06/23 1428 06/06/23 1428 06/06/23 1428 --   Oral Monitor Sitting Right arm       Pain Score       06/06/23 1524       4           Vitals:    06/06/23 1245 06/06/23 1400 06/06/23 1428 06/06/23 1524   BP: 142/70 121/67 125/77 120/80   Pulse: 93 62 71 70   Patient Position - Orthostatic VS:   Sitting Sitting         Visual Acuity      ED Medications  Medications   dicyclomine (BENTYL) tablet 20 mg (20 mg Oral Given 6/6/23 1300)   multi-electrolyte (ISOLYTE-S PH 7 4) bolus 1,000 mL (0 mL Intravenous Stopped 6/6/23 1444)   iohexol (OMNIPAQUE) 350 MG/ML injection (SINGLE-DOSE) 100 mL (100 mL Intravenous Given 6/6/23 1341)   amoxicillin-clavulanate (AUGMENTIN) 875-125 mg per tablet 1 tablet (1 tablet Oral Given 6/6/23 1521)       Diagnostic Studies  Results Reviewed     Procedure Component Value Units Date/Time    Urine Microscopic [[id number]]  (Abnormal) Collected: 06/06/23 1300    Lab Status: Final result Specimen: Urine, Clean Catch Updated: 06/06/23 1327     RBC, UA 2-4 /hpf      WBC, UA 2-4 /hpf      Epithelial Cells Occasional /hpf      Bacteria, UA None Seen /hpf      MUCUS THREADS Moderate    UA w Reflex to Microscopic w Reflex to Culture [[id number]]  (Abnormal) Collected: 06/06/23 1300    Lab Status: Final result Specimen: Urine, Clean Catch Updated: 06/06/23 1324     Color, UA Yellow     Clarity, UA Clear     Specific Gravity, UA 1 024     pH, UA 5 5     Leukocytes, UA Negative     Nitrite, UA Negative     Protein, UA Trace mg/dl      Glucose, UA Negative mg/dl      Ketones, UA Trace mg/dl      Urobilinogen, UA <2 0 mg/dl      Bilirubin, UA Negative     Occult Blood, UA Trace    Comprehensive metabolic panel [[id number]]  (Abnormal) Collected: 06/06/23 1230    Lab Status: Final result Specimen: Blood from Arm, Left Updated: 06/06/23 1312     Sodium 139 mmol/L      Potassium 3 3 mmol/L      Chloride 104 mmol/L      CO2 26 mmol/L      ANION GAP 9 mmol/L      BUN 14 mg/dL      Creatinine 0 74 mg/dL      Glucose 110 mg/dL      Calcium 10 0 mg/dL      AST 12 U/L      ALT 10 U/L      Alkaline Phosphatase 43 U/L      Total Protein 7 8 g/dL      Albumin 5 0 g/dL      Total Bilirubin 0 93 mg/dL      eGFR 96 ml/min/1 73sq m     Narrative:      [place] guidelines for Chronic Kidney Disease (CKD):   •  Stage 1 with normal or high GFR (GFR > 90 mL/min/1 73 square meters)  •  Stage 2 Mild CKD (GFR = 60-89 mL/min/1 73 square meters)  •  Stage 3A Moderate CKD (GFR = 45-59 mL/min/1 73 square meters)  •  Stage 3B Moderate CKD (GFR = 30-44 mL/min/1 73 square meters)  •  Stage 4 Severe CKD (GFR = 15-29 mL/min/1 73 square meters)  •  Stage 5 End Stage CKD (GFR <15 mL/min/1 73 square meters)  Note: GFR calculation is accurate only with a steady state creatinine    Lipase [[id number]]  (Abnormal) Collected: 06/06/23 1230    Lab Status: Final result Specimen: Blood from Arm, Left Updated: 06/06/23 1312     Lipase <6 u/L     POCT pregnancy, urine [[id number]]  (Normal) Resulted: 06/06/23 1304    Lab Status: Final result Specimen: Urine Updated: 06/06/23 1304     EXT Preg Test, Ur Negative     Control Valid    CBC and differential [[id number]]  (Abnormal) Collected: 06/06/23 1230    Lab Status: Final result Specimen: Blood from Arm, Left Updated: 06/06/23 1239     WBC 14 44 Thousand/uL      RBC 4 36 Million/uL      Hemoglobin 14 8 g/dL      Hematocrit 42 4 %      MCV 97 fL      MCH 33 9 pg      MCHC 34 9 g/dL      RDW 11 4 %      MPV 10 8 fL      Platelets 673 Thousands/uL      nRBC 0 /100 WBCs      Neutrophils Relative 72 %      Immat GRANS % 0 %      Lymphocytes Relative 21 %      Monocytes Relative 7 %      Eosinophils Relative 0 %      Basophils Relative 0 %      Neutrophils Absolute 10 38 Thousands/µL      Immature Grans Absolute 0 05 Thousand/uL      Lymphocytes Absolute 2 97 Thousands/µL      Monocytes Absolute 0 98 Thousand/µL      Eosinophils Absolute 0 03 Thousand/µL      Basophils Absolute 0 03 Thousands/µL                  CT abdomen pelvis with contrast   Final Result by Cecy Peña MD (06/06 1447)      Acute uncomplicated diverticulitis of the sigmoid colon  The study was marked in [facility] for immediate notification  Workstation performed: [id number]                    Procedures  Procedures         ED Course                               SBIRT 20yo+    Flowsheet Row Most Recent Value   Initial Alcohol Screen: US AUDIT-C     1  How often do you have a drink containing alcohol? 0 Filed at: 06/06/2023 1221   2  How many drinks containing alcohol do you have on a typical day you are drinking? 0 Filed at: 06/06/2023 1221   3a  Male UNDER 65: How often do you have five or more drinks on one occasion? 0 Filed at: 06/06/2023 1221   3b  FEMALE Any Age, or MALE 65+:  How often do you have 4 or more drinks on one occassion? 0 Filed at: 06/06/2023 1221   Audit-C Score 0 Filed at: 06/06/2023 1221   TYESHA: How many times in the past year have you    Used an illegal drug or used a prescription medication for non-medical reasons? Never Filed at: 06/06/2023 1221                    Medical Decision Making  Patient presents with lower abdominal pain  Abdominal exam without peritoneal signs  Well appearing patient  Considered and doubt ovarian torsion given history and presentation  Given work up low suspicion for acute hepatobiliary disease, acute pancreatitis, bowel obstruction  Patient had a CT abdomen/pelvis which resulted with acute uncomplicated diverticulitis  Patient was given a dose of bentyl and Augmentin  Prescription for Augmentin BID x 14 days  Advised a clear liquid for a week and advance as tolerated  Follow up with GI and return to the ER if symptoms worsen  Amount and/or Complexity of Data Reviewed  Labs: ordered  Radiology: ordered  Risk  Prescription drug management  Disposition  Final diagnoses:   Diverticulitis     Time reflects when diagnosis was documented in both MDM as applicable and the Disposition within this note     Time User Action Codes Description Comment    6/6/2023  2:55 PM Ailyn Ventura Add [K57 92] Diverticulitis       ED Disposition     ED Disposition   Discharge    Condition   Stable    Date/Time   Tue Jun 6, 2023  2:54 PM    Comment   Alphonse Vicente discharge to home/self care                 Follow-up Information     Follow up With Specialties Details Why Contact Info Additional Information    GRAHAM Bloom Nurse Practitioner, [facility]   [address] [facility]  [phone]       [facility]   [facility] [address] [id number]  [address] [place] [address] [address], [facility], [facility], [address] [facility]           Discharge Medication List as of 6/6/2023  3:08 PM      START taking these medications    Details   amoxicillin-clavulanate (AUGMENTIN) 875-125 mg per tablet Take 1 tablet by mouth every 12 (twelve) hours for 10 days, Starting Tue 6/6/2023, Until Fri 6/16/2023, Normal      !! dicyclomine (BENTYL) 20 mg tablet Take 1 tablet (20 mg total) by mouth 2 (two) times a day, Starting Tue 6/6/2023, Normal       !! - Potential duplicate medications found  Please discuss with provider  CONTINUE these medications which have NOT CHANGED    Details   !! dicyclomine (BENTYL) 20 mg tablet Take 1 tablet (20 mg total) by mouth every 6 (six) hours as needed (abdominal pain), Starting Thu 9/29/2022, Normal      ibuprofen (MOTRIN) 200 mg tablet Take by mouth every 6 (six) hours as needed for mild pain, Historical Med      methocarbamol (ROBAXIN) 500 mg tablet Take 1 tablet (500 mg total) by mouth 2 (two) times a day, Starting Thu 5/4/2023, Normal      metoclopramide (Reglan) 10 mg tablet Take 1 tablet (10 mg total) by mouth every 6 (six) hours, Starting Thu 5/4/2023, Normal      Rimegepant Sulfate (Nurtec) 75 MG TBDP 1 tablet p o  p r n  headache maximum once a day maximum twice a week, Normal       !! - Potential duplicate medications found  Please discuss with provider  No discharge procedures on file      PDMP Review     None          ED Provider  Electronically Signed by           Avani Sloan  06/06/23 7991

## 2023-06-06 NOTE — Clinical Note
Kathy Marcus was seen and treated in our emergency department on 6/6/2023  Diagnosis:     Aydee Pompa  may return to work on return date  She may return on this date: 06/08/2023         If you have any questions or concerns, please don't hesitate to call        Lucie Chirinos    ______________________________           _______________          _______________  Hospital Representative                              Date                                Time

## 2023-06-08 ENCOUNTER — OFFICE VISIT (OUTPATIENT)
Dept: GASTROENTEROLOGY | Facility: CLINIC | Age: 48
End: 2023-06-08
Payer: COMMERCIAL

## 2023-06-08 VITALS
OXYGEN SATURATION: 100 % | DIASTOLIC BLOOD PRESSURE: 76 MMHG | BODY MASS INDEX: 22.73 KG/M2 | HEART RATE: 80 BPM | SYSTOLIC BLOOD PRESSURE: 121 MMHG | HEIGHT: 61 IN | WEIGHT: 120.4 LBS

## 2023-06-08 DIAGNOSIS — K57.32 DIVERTICULITIS LARGE INTESTINE W/O PERFORATION OR ABSCESS W/O BLEEDING: Primary | ICD-10-CM

## 2023-06-08 DIAGNOSIS — K58.0 IRRITABLE BOWEL SYNDROME WITH DIARRHEA: ICD-10-CM

## 2023-06-08 PROCEDURE — 99213 OFFICE O/P EST LOW 20 MIN: CPT | Performed by: PHYSICIAN ASSISTANT

## 2023-06-08 NOTE — PROGRESS NOTES
[facility] - Outpatient Follow-up Note  Trina Felder 52 y o  female MRN: [id number]  Encounter: [id number]          ASSESSMENT AND PLAN:      1  Diverticulitis large intestine w/o perforation or abscess w/o bleeding  Recurrent  Symptoms started last weekend  CT on 6/6 showed uncomplicated sigmoid diverticulitis  She is on Augmentin  Advised 1 more day of liquid diet followed by 1 week of low roughage diet    After symptoms resolve/off antibiotics advised high fiber diet/fiber supplement    Diverticulitis noted on CT scans from 2017 and 2021  Colonoscopy 12/2022 normal    2  Irritable bowel syndrome with diarrhea  Had been doing very well on fiber, probiotic and IBguard  She has not been consistent with these recently  She will restart regimen    ______________________________________________________________________    SUBJECTIVE: 49-year-old female with a history of irritable bowel syndrome and diverticulitis who presents for follow-up after ER visit  She reports that last weekend she began to develop lower abdominal pain  She reports that the pain intensified Monday night into Tuesday and she went to the emergency room  CT scan showed diverticulitis     It appeared to be uncomplicated  She was prescribed a 10-day course of Augmentin  She was advised to eat a liquid or a low roughage diet  At this point she has only taken 2 doses of her Augmentin  She does report some improvement in the pain although it has not resolved  On Monday and Tuesday she was having constipation  She reports that yesterday she started to have nonbloody diarrhea  She is tolerating a liquid diet at present  Her last episode of diverticulitis was documented on CT scan in 2021  This was uncomplicated as well  She has had several episodes prior to that as well  Her last colonoscopy was in December 2022  This was a normal exam   Her irritable bowel syndrome has been under good control    She had "been consistent with taking fiber, probiotic and IBgard  Unfortunately, due to life stressors she fell off of this regimen over the past few months  REVIEW OF SYSTEMS IS OTHERWISE NEGATIVE  Historical Information   Past Medical History:   Diagnosis Date   • GERD (gastroesophageal reflux disease)    • IBS (irritable bowel syndrome)    • Migraines    • Varicella      Past Surgical History:   Procedure Laterality Date   • ABDOMINOPLASTY N/A 10/8/2020    Procedure: ABDOMINOPLASTY;  Surgeon: Johnathon Lunsford MD;  Location: [address] [place] [address];  Service: Plastics   • APPENDECTOMY     • TUBAL LIGATION       Social History   Social History     Substance and Sexual Activity   Alcohol Use Yes    Comment: A couple times a month     Social History     Substance and Sexual Activity   Drug Use Yes   • Types: Marijuana     Social History     Tobacco Use   Smoking Status Former   • Types: Cigarettes   • Quit date: 2014   • Years since quittin 4   Smokeless Tobacco Never     Family History   Problem Relation Age of Onset   • No Known Problems Mother    • Cancer Father    • Cancer Paternal Grandmother    • Ovarian cancer Neg Hx    • Cervical cancer Neg Hx    • Uterine cancer Neg Hx    • Breast cancer Neg Hx        Meds/Allergies       Current Outpatient Medications:   •  amoxicillin-clavulanate (AUGMENTIN) 875-125 mg per tablet  •  dicyclomine (BENTYL) 20 mg tablet    Allergies   Allergen Reactions   • Prochlorperazine Hives and Other (See Comments)   • Asa [Aspirin] Vomiting   • Ondansetron Anxiety           Objective     Blood pressure 121/76, pulse 80, height 5' 1\" (1 549 m), weight 54 6 kg (120 lb 6 4 oz), SpO2 100 %, not currently breastfeeding  Body mass index is 22 75 kg/m²        PHYSICAL EXAM:      General Appearance:   Alert, cooperative, no distress   HEENT:   Normocephalic, atraumatic, anicteric      Neck:  Supple, symmetrical, trachea midline   Lungs:   Clear to auscultation bilaterally; no rales, rhonchi " or wheezing; respirations unlabored    Heart[de-identified]   Regular rate and rhythm; no murmur, rub, or gallop  Abdomen:   Soft, non-tender, non-distended; normal bowel sounds; no masses, no organomegaly    Genitalia:   Deferred    Rectal:   Deferred    Extremities:  No cyanosis, clubbing or edema    Pulses:  2+ and symmetric    Skin:  No jaundice, rashes, or lesions    Lymph nodes:  No palpable cervical lymphadenopathy        Lab Results:   No visits with results within 1 Day(s) from this visit     Latest known visit with results is:   Admission on 06/06/2023, Discharged on 06/06/2023   Component Date Value   • WBC 06/06/2023 14 44 (H)    • RBC 06/06/2023 4 36    • Hemoglobin 06/06/2023 14 8    • Hematocrit 06/06/2023 42 4    • MCV 06/06/2023 97    • MCH 06/06/2023 33 9    • MCHC 06/06/2023 34 9    • RDW 06/06/2023 11 4 (L)    • MPV 06/06/2023 10 8    • Platelets 23/39/0621 304    • nRBC 06/06/2023 0    • Neutrophils Relative 06/06/2023 72    • Immat GRANS % 06/06/2023 0    • Lymphocytes Relative 06/06/2023 21    • Monocytes Relative 06/06/2023 7    • Eosinophils Relative 06/06/2023 0    • Basophils Relative 06/06/2023 0    • Neutrophils Absolute 06/06/2023 10 38 (H)    • Immature Grans Absolute 06/06/2023 0 05    • Lymphocytes Absolute 06/06/2023 2 97    • Monocytes Absolute 06/06/2023 0 98    • Eosinophils Absolute 06/06/2023 0 03    • Basophils Absolute 06/06/2023 0 03    • Sodium 06/06/2023 139    • Potassium 06/06/2023 3 3 (L)    • Chloride 06/06/2023 104    • CO2 06/06/2023 26    • ANION GAP 06/06/2023 9    • BUN 06/06/2023 14    • Creatinine 06/06/2023 0 74    • Glucose 06/06/2023 110    • Calcium 06/06/2023 10 0    • AST 06/06/2023 12 (L)    • ALT 06/06/2023 10    • Alkaline Phosphatase 06/06/2023 43    • Total Protein 06/06/2023 7 8    • Albumin 06/06/2023 5 0    • Total Bilirubin 06/06/2023 0 93    • eGFR 06/06/2023 96    • Lipase 06/06/2023 <6 (L)    • Color, UA 06/06/2023 Yellow    • Clarity, UA 06/06/2023 Clear • Specific [place], UA 06/06/2023 1 024    • pH, UA 06/06/2023 5 5    • Leukocytes, UA 06/06/2023 Negative    • Nitrite, UA 06/06/2023 Negative    • Protein, UA 06/06/2023 Trace (A)    • Glucose, UA 06/06/2023 Negative    • Ketones, UA 06/06/2023 Trace (A)    • Urobilinogen, UA 06/06/2023 <2 0    • Bilirubin, UA 06/06/2023 Negative    • Occult Blood, UA 06/06/2023 Trace (A)    • EXT Preg Test, Ur 06/06/2023 Negative    • Control 06/06/2023 Valid    • RBC, UA 06/06/2023 2-4 (A)    • WBC, UA 06/06/2023 2-4 (A)    • Epithelial Cells 06/06/2023 Occasional    • Bacteria, UA 06/06/2023 None Seen    • MUCUS THREADS 06/06/2023 Moderate (A)          Radiology Results:   CT abdomen pelvis with contrast    Result Date: 6/6/2023  Narrative: CT ABDOMEN AND PELVIS WITH IV CONTRAST INDICATION:   lower abdominal pain  COMPARISON: Comparison is made to prior studies, most recent is a CT scan dated January 22, 2017  TECHNIQUE:  CT examination of the abdomen and pelvis was performed  Multiplanar 2D reformatted images were created from the source data  This examination, like all CT scans performed in the [facility], was performed utilizing techniques to minimize radiation dose exposure, including the use of iterative reconstruction and automated exposure control  Radiation dose length product (DLP) for this visit:  591 mGy-cm IV Contrast:  100 mL of iohexol (OMNIPAQUE) Enteric Contrast:  Enteric contrast was not administered  FINDINGS: ABDOMEN LOWER CHEST:  No clinically significant abnormality identified in the visualized lower chest  LIVER/BILIARY TREE:  Unremarkable  GALLBLADDER:  No calcified gallstones  No pericholecystic inflammatory change  SPLEEN:  Unremarkable  PANCREAS:  Unremarkable  ADRENAL GLANDS:  Unremarkable  KIDNEYS/URETERS:  Unremarkable  No hydronephrosis   STOMACH AND BOWEL: Multiple colonic diverticula, with stranding of the fat adjacent to the proximal sigmoid colon compatible with acute diverticulitis  No perforation or abscess  APPENDIX:  No findings to suggest appendicitis  ABDOMINOPELVIC CAVITY:  No ascites  No pneumoperitoneum  No lymphadenopathy  VESSELS:  Unremarkable for patient's age  PELVIS REPRODUCTIVE ORGANS:  Unremarkable for patient's age  URINARY BLADDER:  Unremarkable  ABDOMINAL WALL/INGUINAL REGIONS:  Unremarkable  OSSEOUS STRUCTURES:  No acute fracture or destructive osseous lesion  Impression: Acute uncomplicated diverticulitis of the sigmoid colon  The study was marked in [facility] for immediate notification   Workstation performed: [id number]

## 2023-07-27 ENCOUNTER — HOSPITAL ENCOUNTER (OUTPATIENT)
Dept: MAMMOGRAPHY | Facility: CLINIC | Age: 48
End: 2023-07-27
Payer: COMMERCIAL

## 2023-07-27 VITALS — BODY MASS INDEX: 22.66 KG/M2 | HEIGHT: 61 IN | WEIGHT: 120 LBS

## 2023-07-27 DIAGNOSIS — Z12.31 ENCOUNTER FOR SCREENING MAMMOGRAM FOR MALIGNANT NEOPLASM OF BREAST: ICD-10-CM

## 2023-07-27 PROCEDURE — 77067 SCR MAMMO BI INCL CAD: CPT

## 2023-07-27 PROCEDURE — 77063 BREAST TOMOSYNTHESIS BI: CPT

## 2023-08-01 ENCOUNTER — HOSPITAL ENCOUNTER (OUTPATIENT)
Dept: ULTRASOUND IMAGING | Facility: CLINIC | Age: 48
Discharge: HOME | End: 2023-08-01
Payer: COMMERCIAL

## 2023-08-01 ENCOUNTER — HOSPITAL ENCOUNTER (OUTPATIENT)
Dept: MAMMOGRAPHY | Facility: CLINIC | Age: 48
Discharge: HOME | End: 2023-08-01
Payer: COMMERCIAL

## 2023-08-01 VITALS — HEIGHT: 61 IN | WEIGHT: 120 LBS | BODY MASS INDEX: 22.66 KG/M2

## 2023-08-01 DIAGNOSIS — R92.8 ABNORMAL MAMMOGRAM: ICD-10-CM

## 2023-08-01 PROCEDURE — 76642 ULTRASOUND BREAST LIMITED: CPT

## 2023-08-01 PROCEDURE — 77065 DX MAMMO INCL CAD UNI: CPT

## 2023-08-01 PROCEDURE — G0279 TOMOSYNTHESIS, MAMMO: HCPCS

## 2023-08-01 NOTE — PROGRESS NOTES
Met with patient and Dr Trino Rivera   regarding recommendation for;    ____X_ RIGHT ______LEFT      __X___Ultrasound guided  ______Stereotactic breast biopsy. __X___Verbalized understanding.       Blood thinners:  No: _X____ Yes: ______ What:                 Biopsy teaching sheet given:  Yes: ___X___ No: ________    Pt given contact information and adv to call with any questions/needs

## 2023-08-02 ENCOUNTER — TELEPHONE (OUTPATIENT)
Dept: FAMILY MEDICINE CLINIC | Facility: CLINIC | Age: 48
End: 2023-08-02

## 2023-08-18 ENCOUNTER — OFFICE VISIT (OUTPATIENT)
Dept: FAMILY MEDICINE CLINIC | Facility: CLINIC | Age: 48
End: 2023-08-18
Payer: COMMERCIAL

## 2023-08-18 ENCOUNTER — APPOINTMENT (OUTPATIENT)
Dept: LAB | Facility: CLINIC | Age: 48
End: 2023-08-18
Payer: COMMERCIAL

## 2023-08-18 VITALS
DIASTOLIC BLOOD PRESSURE: 70 MMHG | BODY MASS INDEX: 23.22 KG/M2 | RESPIRATION RATE: 16 BRPM | SYSTOLIC BLOOD PRESSURE: 112 MMHG | TEMPERATURE: 97.5 F | WEIGHT: 123 LBS | HEART RATE: 83 BPM | HEIGHT: 61 IN | OXYGEN SATURATION: 98 %

## 2023-08-18 DIAGNOSIS — Z00.00 ANNUAL PHYSICAL EXAM: Primary | ICD-10-CM

## 2023-08-18 DIAGNOSIS — F41.9 ANXIETY: ICD-10-CM

## 2023-08-18 DIAGNOSIS — Z00.00 ANNUAL PHYSICAL EXAM: ICD-10-CM

## 2023-08-18 LAB
ALBUMIN SERPL BCP-MCNC: 3.9 G/DL (ref 3.5–5)
ALP SERPL-CCNC: 41 U/L (ref 46–116)
ALT SERPL W P-5'-P-CCNC: 18 U/L (ref 12–78)
ANION GAP SERPL CALCULATED.3IONS-SCNC: 3 MMOL/L
AST SERPL W P-5'-P-CCNC: 12 U/L (ref 5–45)
BASOPHILS # BLD AUTO: 0.03 THOUSANDS/ÂΜL (ref 0–0.1)
BASOPHILS NFR BLD AUTO: 0 % (ref 0–1)
BILIRUB SERPL-MCNC: 0.38 MG/DL (ref 0.2–1)
BUN SERPL-MCNC: 13 MG/DL (ref 5–25)
CALCIUM SERPL-MCNC: 8.9 MG/DL (ref 8.3–10.1)
CHLORIDE SERPL-SCNC: 112 MMOL/L (ref 96–108)
CHOLEST SERPL-MCNC: 212 MG/DL
CO2 SERPL-SCNC: 28 MMOL/L (ref 21–32)
CREAT SERPL-MCNC: 0.71 MG/DL (ref 0.6–1.3)
EOSINOPHIL # BLD AUTO: 0.16 THOUSAND/ÂΜL (ref 0–0.61)
EOSINOPHIL NFR BLD AUTO: 2 % (ref 0–6)
ERYTHROCYTE [DISTWIDTH] IN BLOOD BY AUTOMATED COUNT: 12.2 % (ref 11.6–15.1)
GFR SERPL CREATININE-BSD FRML MDRD: 101 ML/MIN/1.73SQ M
GLUCOSE P FAST SERPL-MCNC: 95 MG/DL (ref 65–99)
HCT VFR BLD AUTO: 43.1 % (ref 34.8–46.1)
HDLC SERPL-MCNC: 55 MG/DL
HGB BLD-MCNC: 14.5 G/DL (ref 11.5–15.4)
IMM GRANULOCYTES # BLD AUTO: 0.02 THOUSAND/UL (ref 0–0.2)
IMM GRANULOCYTES NFR BLD AUTO: 0 % (ref 0–2)
LDLC SERPL CALC-MCNC: 137 MG/DL (ref 0–100)
LYMPHOCYTES # BLD AUTO: 2.45 THOUSANDS/ÂΜL (ref 0.6–4.47)
LYMPHOCYTES NFR BLD AUTO: 35 % (ref 14–44)
MCH RBC QN AUTO: 33.6 PG (ref 26.8–34.3)
MCHC RBC AUTO-ENTMCNC: 33.6 G/DL (ref 31.4–37.4)
MCV RBC AUTO: 100 FL (ref 82–98)
MONOCYTES # BLD AUTO: 0.4 THOUSAND/ÂΜL (ref 0.17–1.22)
MONOCYTES NFR BLD AUTO: 6 % (ref 4–12)
NEUTROPHILS # BLD AUTO: 3.99 THOUSANDS/ÂΜL (ref 1.85–7.62)
NEUTS SEG NFR BLD AUTO: 57 % (ref 43–75)
NONHDLC SERPL-MCNC: 157 MG/DL
NRBC BLD AUTO-RTO: 0 /100 WBCS
PLATELET # BLD AUTO: 254 THOUSANDS/UL (ref 149–390)
PMV BLD AUTO: 12 FL (ref 8.9–12.7)
POTASSIUM SERPL-SCNC: 4.5 MMOL/L (ref 3.5–5.3)
PROT SERPL-MCNC: 6.7 G/DL (ref 6.4–8.4)
RBC # BLD AUTO: 4.31 MILLION/UL (ref 3.81–5.12)
SODIUM SERPL-SCNC: 143 MMOL/L (ref 135–147)
TRIGL SERPL-MCNC: 101 MG/DL
TSH SERPL DL<=0.05 MIU/L-ACNC: 0.76 UIU/ML (ref 0.45–4.5)
WBC # BLD AUTO: 7.05 THOUSAND/UL (ref 4.31–10.16)

## 2023-08-18 PROCEDURE — 36415 COLL VENOUS BLD VENIPUNCTURE: CPT

## 2023-08-18 PROCEDURE — 80053 COMPREHEN METABOLIC PANEL: CPT

## 2023-08-18 PROCEDURE — 85025 COMPLETE CBC W/AUTO DIFF WBC: CPT

## 2023-08-18 PROCEDURE — 80061 LIPID PANEL: CPT

## 2023-08-18 PROCEDURE — 99396 PREV VISIT EST AGE 40-64: CPT | Performed by: NURSE PRACTITIONER

## 2023-08-18 PROCEDURE — 84443 ASSAY THYROID STIM HORMONE: CPT

## 2023-08-18 RX ORDER — BUSPIRONE HYDROCHLORIDE 10 MG/1
10 TABLET ORAL 2 TIMES DAILY PRN
Qty: 30 TABLET | Refills: 3 | Status: SHIPPED | OUTPATIENT
Start: 2023-08-18

## 2023-08-18 NOTE — PROGRESS NOTES
ADULT ANNUAL [id number] PRIMARY CARE    NAME: Molly Mortensen  AGE: 52 y.o. SEX: female  : 1975     DATE: 2023     Assessment and Plan:     Problem List Items Addressed This Visit        Other    Annual physical exam - Primary     Annual physical completed. Mammogram ordered. Discussed management of anxiety. Patient does follow with gastroenterology for IBS. Continues to have some headaches was following with neurology, is awaiting follow-up appointment, has not received any medication, was told that Nurtec need to be preauthorized. Patient has been doing some self-care, meditation, journaling. Patient has abnormal mammogram, has upcoming biopsy. Education and encouragement provided. BuSpar ordered may take twice a day as needed to help with anxiety. Discussed nonpharmacological intervention. Relevant Orders    CBC and differential    Comprehensive metabolic panel    Lipid panel    TSH, 3rd generation with Free T4 reflex   Other Visit Diagnoses     Anxiety        Relevant Medications    busPIRone (BUSPAR) 10 mg tablet          Immunizations and preventive care screenings were discussed with patient today. Appropriate education was printed on patient's after visit summary. Counseling:  Alcohol/drug use: discussed moderation in alcohol intake, the recommendations for healthy alcohol use, and avoidance of illicit drug use. Dental Health: discussed importance of regular tooth brushing, flossing, and dental visits. Injury prevention: discussed safety/seat belts, safety helmets, smoke detectors, carbon dioxide detectors, and smoking near bedding or upholstery. Sexual health: discussed sexually transmitted diseases, partner selection, use of condoms, avoidance of unintended pregnancy, and contraceptive alternatives. Exercise: the importance of regular exercise/physical activity was discussed.  Recommend exercise 3-5 times per week for at least 30 minutes. Return in about 1 year (around 2024) for Annual physical.     Chief Complaint:     Chief Complaint   Patient presents with   • Physical Exam      History of Present Illness:     Adult Annual Physical   Patient here for a comprehensive physical exam. The patient reports problems - anxiety, ibs, headache. Diet and Physical Activity  Diet/Nutrition: well balanced diet. Exercise: walking and 30-60 minutes on average. Depression Screening  PHQ-2/9 Depression Screening    Little interest or pleasure in doing things: 0 - not at all  Feeling down, depressed, or hopeless: 0 - not at all  Trouble falling or staying asleep, or sleeping too much: 0 - not at all  Feeling tired or having little energy: 0 - not at all  Poor appetite or overeatin - not at all  Feeling bad about yourself - or that you are a failure or have let yourself or your family down: 0 - not at all  Trouble concentrating on things, such as reading the newspaper or watching television: 0 - not at all  Moving or speaking so slowly that other people could have noticed. Or the opposite - being so fidgety or restless that you have been moving around a lot more than usual: 0 - not at all  Thoughts that you would be better off dead, or of hurting yourself in some way: 0 - not at all  PHQ-9 Score: 0   PHQ-9 Interpretation: No or Minimal depression        General Health  Sleep: sleeps well. Hearing: normal - bilateral.  Vision: most recent eye exam <1 year ago and wears glasses. Dental: no dental visits for >1 year and brushes teeth twice daily. /GYN Health  Patient is: perimenopausal   Last menstrual period: 2023  Contraceptive method: tubal ligation. Review of Systems:     Review of Systems   Constitutional: Negative. HENT: Negative. Eyes: Negative. Respiratory: Negative. Cardiovascular: Negative. Gastrointestinal: Positive for diarrhea. Endocrine: Negative. Genitourinary: Negative. Musculoskeletal: Positive for neck pain. Allergic/Immunologic: Negative. Neurological: Positive for headaches. Psychiatric/Behavioral: Positive for dysphoric mood and sleep disturbance. The patient is nervous/anxious.        Past Medical History:     Past Medical History:   Diagnosis Date   • GERD (gastroesophageal reflux disease)    • IBS (irritable bowel syndrome)    • Migraines    • Varicella       Past Surgical History:     Past Surgical History:   Procedure Laterality Date   • ABDOMINOPLASTY N/A 10/8/2020    Procedure: ABDOMINOPLASTY;  Surgeon: Autumn Eugene MD;  Location: [facility] MAIN OR;  Service: Plastics   • APPENDECTOMY     • TUBAL LIGATION        Social History:     Social History     Socioeconomic History   • Marital status: /Civil Union     Spouse name: None   • Number of children: None   • Years of education: None   • Highest education level: None   Occupational History   • None   Tobacco Use   • Smoking status: Former     Types: Cigarettes     Quit date: 2014     Years since quittin.6   • Smokeless tobacco: Never   Vaping Use   • Vaping Use: Never used   Substance and Sexual Activity   • Alcohol use: Yes     Comment: A couple times a month   • Drug use: Yes     Types: Marijuana   • Sexual activity: Yes     Partners: Male     Birth control/protection: Other   Other Topics Concern   • None   Social History Narrative   • None     Social Determinants of Health     Financial Resource Strain: Not on file   Food Insecurity: Not on file   Transportation Needs: Not on file   Physical Activity: Not on file   Stress: Not on file   Social Connections: Not on file   Intimate Partner Violence: Not on file   Housing Stability: Not on file      Family History:     Family History   Problem Relation Age of Onset   • No Known Problems Mother    • Cancer Father    • Cancer Paternal Grandmother    • Ovarian cancer Neg Hx    • Cervical cancer Neg Hx    • Uterine cancer Neg Hx    • Breast cancer Neg Hx       Current Medications:     Current Outpatient Medications   Medication Sig Dispense Refill   • busPIRone (BUSPAR) 10 mg tablet Take 1 tablet (10 mg total) by mouth 2 (two) times a day as needed (anxiety) 30 tablet 3   • dicyclomine (BENTYL) 20 mg tablet Take 1 tablet (20 mg total) by mouth every 6 (six) hours as needed (abdominal pain) 45 tablet 2     No current facility-administered medications for this visit. Allergies: Allergies   Allergen Reactions   • Prochlorperazine Hives and Other (See Comments)   • Asa [Aspirin] Vomiting   • Ondansetron Anxiety      Physical Exam:     /70   Pulse 83   Temp 97.5 °F (36.4 °C) (Temporal)   Resp 16   Ht 5' 1" (1.549 m)   Wt 55.8 kg (123 lb)   LMP 05/22/2023 (Approximate)   SpO2 98%   BMI 23.24 kg/m²     Physical Exam  Constitutional:       General: She is not in acute distress. Appearance: Normal appearance. She is not ill-appearing. HENT:      Head: Normocephalic and atraumatic. Nose: Nose normal.      Mouth/Throat:      Mouth: Mucous membranes are moist.   Eyes:      Pupils: Pupils are equal, round, and reactive to light. Cardiovascular:      Rate and Rhythm: Normal rate and regular rhythm. Pulses: Normal pulses. Heart sounds: Normal heart sounds. Pulmonary:      Effort: Pulmonary effort is normal. No respiratory distress. Breath sounds: Normal breath sounds. Chest:      Chest wall: No tenderness. Abdominal:      General: Abdomen is flat. Bowel sounds are normal. There is no distension. Palpations: There is no mass. Tenderness: There is no abdominal tenderness. Musculoskeletal:         General: Normal range of motion. Cervical back: Normal range of motion and neck supple. Skin:     General: Skin is warm and dry. Neurological:      General: No focal deficit present. Mental Status: She is alert and oriented to person, place, and time.    Psychiatric: Attention and Perception: Attention and perception normal.         Mood and Affect: Mood is anxious. Speech: Speech is rapid and pressured. Behavior: Behavior normal.         Thought Content:  Thought content normal.         Cognition and Memory: Cognition and memory normal.         Judgment: Judgment normal.          Abbi Crump, [address] [facility]

## 2023-08-18 NOTE — ASSESSMENT & PLAN NOTE
Annual physical completed. Mammogram ordered. Discussed management of anxiety. Patient does follow with gastroenterology for IBS. Continues to have some headaches was following with neurology, is awaiting follow-up appointment, has not received any medication, was told that Nurtec need to be preauthorized. Patient has been doing some self-care, meditation, journaling. Patient has abnormal mammogram, has upcoming biopsy. Education and encouragement provided. BuSpar ordered may take twice a day as needed to help with anxiety. Discussed nonpharmacological intervention.

## 2023-08-18 NOTE — PATIENT INSTRUCTIONS
Take a buspar twice a day as needed  Obtain fasting labs, nothing to eat after midnight, may drink water. Wellness Visit for Adults   AMBULATORY CARE:   A wellness visit  is when you see your healthcare provider to get screened for health problems. Your healthcare provider will also give you advice on how to stay healthy. Write down your questions so you remember to ask them. Ask your healthcare provider how often you should have a wellness visit. What happens at a wellness visit:  Your healthcare provider will ask about your health, and your family history of health problems. This includes high blood pressure, heart disease, and cancer. He or she will ask if you have symptoms that concern you, if you smoke, and about your mood. You may also be asked about your intake of medicines, supplements, food, and alcohol. Any of the following may be done: Your weight  will be checked. Your height may also be checked so your body mass index (BMI) can be calculated. Your BMI shows if you are at a healthy weight. Your blood pressure  and heart rate will be checked. Your temperature may also be checked. Blood and urine tests  may be done. Blood tests may be done to check your cholesterol levels. Abnormal cholesterol levels increase your risk for heart disease and stroke. You may also need a blood or urine test to check for diabetes if you are at increased risk. Urine tests may be done to look for signs of an infection or kidney disease. A physical exam  includes checking your heartbeat and lungs with a stethoscope. Your healthcare provider may also check your skin to look for sun damage. Screening tests  may be recommended. A screening test is done to check for diseases that may not cause symptoms. The screening tests you may need depend on your age, gender, family history, and lifestyle habits. For example, colorectal screening may be recommended if you are 48years old or older.     Screening tests you need if you are a woman:   A Pap smear  is used to screen for cervical cancer. Pap smears are usually done every 3 to 5 years depending on your age. You may need them more often if you have had abnormal Pap smear test results in the past. Ask your healthcare provider how often you should have a Pap smear. A mammogram  is an x-ray of your breasts to screen for breast cancer. Experts recommend mammograms every 2 years starting at age 48 years. You may need a mammogram at age 52 years or younger if you have an increased risk for breast cancer. Talk to your healthcare provider about when you should start having mammograms and how often you need them. Vaccines you may need:   Get an influenza vaccine  every year. The influenza vaccine protects you from the flu. Several types of viruses cause the flu. The viruses change over time, so new vaccines are made each year. Get a tetanus-diphtheria (Td) booster vaccine  every 10 years. This vaccine protects you against tetanus and diphtheria. Tetanus is a severe infection that may cause painful muscle spasms and lockjaw. Diphtheria is a severe bacterial infection that causes a thick covering in the back of your mouth and throat. Get a human papillomavirus (HPV) vaccine  if you are female and aged 23 to 32 or male 23 to 24 and never received it. This vaccine protects you from HPV infection. HPV is the most common infection spread by sexual contact. HPV may also cause vaginal, penile, and anal cancers. Get a pneumococcal vaccine  if you are aged 72 years or older. The pneumococcal vaccine is an injection given to protect you from pneumococcal disease. Pneumococcal disease is an infection caused by pneumococcal bacteria. The infection may cause pneumonia, meningitis, or an ear infection. Get a shingles vaccine  if you are 60 or older, even if you have had shingles before. The shingles vaccine is an injection to protect you from the varicella-zoster virus.  This is the same virus that causes chickenpox. Shingles is a painful rash that develops in people who had chickenpox or have been exposed to the virus. How to eat healthy:  My Plate is a model for planning healthy meals. It shows the types and amounts of foods that should go on your plate. Fruits and vegetables make up about half of your plate, and grains and protein make up the other half. A serving of dairy is included on the side of your plate. The amount of calories and serving sizes you need depends on your age, gender, weight, and height. Examples of healthy foods are listed below:  Eat a variety of vegetables  such as dark green, red, and orange vegetables. You can also include canned vegetables low in sodium (salt) and frozen vegetables without added butter or sauces. Eat a variety of fresh fruits , canned fruit in 100% juice, frozen fruit, and dried fruit. Include whole grains. At least half of the grains you eat should be whole grains. Examples include whole-wheat bread, wheat pasta, brown rice, and whole-grain cereals such as oatmeal.    Eat a variety of protein foods such as seafood (fish and shellfish), lean meat, and poultry without skin (turkey and chicken). Examples of lean meats include pork leg, shoulder, or tenderloin, and beef round, sirloin, tenderloin, and extra lean ground beef. Other protein foods include eggs and egg substitutes, beans, peas, soy products, nuts, and seeds. Choose low-fat dairy products such as skim or 1% milk or low-fat yogurt, cheese, and cottage cheese. Limit unhealthy fats  such as butter, hard margarine, and shortening. Exercise:  Exercise at least 30 minutes per day on most days of the week. Some examples of exercise include walking, biking, dancing, and swimming. You can also fit in more physical activity by taking the stairs instead of the elevator or parking farther away from stores. Include muscle strengthening activities 2 days each week.  Regular exercise provides many health benefits. It helps you manage your weight, and decreases your risk for type 2 diabetes, heart disease, stroke, and high blood pressure. Exercise can also help improve your mood. Ask your healthcare provider about the best exercise plan for you. General health and safety guidelines:   Do not smoke. Nicotine and other chemicals in cigarettes and cigars can cause lung damage. Ask your healthcare provider for information if you currently smoke and need help to quit. E-cigarettes or smokeless tobacco still contain nicotine. Talk to your healthcare provider before you use these products. Limit alcohol. A drink of alcohol is 12 ounces of beer, 5 ounces of wine, or 1½ ounces of liquor. Lose weight, if needed. Being overweight increases your risk of certain health conditions. These include heart disease, high blood pressure, type 2 diabetes, and certain types of cancer. Protect your skin. Do not sunbathe or use tanning beds. Use sunscreen with a SPF 15 or higher. Apply sunscreen at least 15 minutes before you go outside. Reapply sunscreen every 2 hours. Wear protective clothing, hats, and sunglasses when you are outside. Drive safely. Always wear your seatbelt. Make sure everyone in your car wears a seatbelt. A seatbelt can save your life if you are in an accident. Do not use your cell phone when you are driving. This could distract you and cause an accident. Pull over if you need to make a call or send a text message. Practice safe sex. Use latex condoms if are sexually active and have more than one partner. Your healthcare provider may recommend screening tests for sexually transmitted infections (STIs). Wear helmets, lifejackets, and protective gear. Always wear a helmet when you ride a bike or motorcycle, go skiing, or play sports that could cause a head injury. Wear protective equipment when you play sports.  Wear a lifejacket when you are on a boat or doing water sports. © Copyright [facility] 2022 Information is for End User's use only and may not be sold, redistributed or otherwise used for commercial purposes. The above information is an  only. It is not intended as medical advice for individual conditions or treatments. Talk to your doctor, nurse or pharmacist before following any medical regimen to see if it is safe and effective for you.

## 2023-08-23 ENCOUNTER — HOSPITAL ENCOUNTER (OUTPATIENT)
Dept: MAMMOGRAPHY | Facility: CLINIC | Age: 48
Discharge: HOME | End: 2023-08-23

## 2023-08-23 ENCOUNTER — HOSPITAL ENCOUNTER (OUTPATIENT)
Dept: ULTRASOUND IMAGING | Facility: CLINIC | Age: 48
Discharge: HOME | End: 2023-08-23
Admitting: RADIOLOGY
Payer: COMMERCIAL

## 2023-08-23 VITALS — DIASTOLIC BLOOD PRESSURE: 77 MMHG | SYSTOLIC BLOOD PRESSURE: 119 MMHG | HEART RATE: 56 BPM

## 2023-08-23 DIAGNOSIS — R92.8 ABNORMAL MAMMOGRAM: ICD-10-CM

## 2023-08-23 PROCEDURE — 88305 TISSUE EXAM BY PATHOLOGIST: CPT | Performed by: PATHOLOGY

## 2023-08-23 PROCEDURE — A4648 IMPLANTABLE TISSUE MARKER: HCPCS

## 2023-08-23 PROCEDURE — 19083 BX BREAST 1ST LESION US IMAG: CPT

## 2023-08-23 RX ORDER — LIDOCAINE HYDROCHLORIDE 10 MG/ML
5 INJECTION, SOLUTION EPIDURAL; INFILTRATION; INTRACAUDAL; PERINEURAL ONCE
Status: COMPLETED | OUTPATIENT
Start: 2023-08-23 | End: 2023-08-23

## 2023-08-23 RX ADMIN — LIDOCAINE HYDROCHLORIDE 5 ML: 10 INJECTION, SOLUTION EPIDURAL; INFILTRATION; INTRACAUDAL; PERINEURAL at 14:02

## 2023-08-23 NOTE — PROGRESS NOTES
Procedure type:    __x___ultrasound guided _____stereotactic    Breast:    _____Left __x___Right    Location: 10 o'clock 2 cmfn     Needle: 12 gauge onesimo    # of passes: 4    Clip: Ribbon     Performed by: Dr. Uday Haddad held for 5 minutes by: Cristiano Mccarthy Strips:    ___x__yes _____no    Band aid:    __x___yes_____no    Tape and guaze:    _____yes __x___no    Tolerated procedure:    ___x__yes _____no

## 2023-08-23 NOTE — PROGRESS NOTES
Ice pack given:    ___x__yes _____no    Discharge instructions signed by patient:    _____yes ___x__no    Discharge instructions given to patient:    __x___yes _____no    Discharged via:    __x___ambulatory    _____wheelchair    _____stretcher    Stable on discharge:    ___x__yes ____no  Patient would like results over the phone. Ok to leave a message.

## 2023-08-24 PROCEDURE — 88305 TISSUE EXAM BY PATHOLOGIST: CPT | Performed by: PATHOLOGY

## 2023-08-24 NOTE — PROGRESS NOTES
Post procedure call completed    Bleeding: _____yes __X___no, pt denies    Pain: ___X_yes _____no, pt did take Motrin early this AM with relief    Redness/Swelling: ______yes ___X___no, pt denies, used ice pack as directed    Band aid removed: _____yes ___X__no (discussed removing when she showers)    Steri-Strips intact: ___X___yes _____no (discussed with patient to remove steri strips on 8/28 if they have not come off on their own)    Pt with no questions at this time, adv will call when results available, adv to call with any questions or concerns, has name/# for contact

## 2023-08-25 ENCOUNTER — TELEPHONE (OUTPATIENT)
Dept: MAMMOGRAPHY | Facility: CLINIC | Age: 48
End: 2023-08-25

## 2023-08-29 ENCOUNTER — TELEPHONE (OUTPATIENT)
Dept: FAMILY MEDICINE CLINIC | Facility: CLINIC | Age: 48
End: 2023-08-29

## 2023-08-29 NOTE — TELEPHONE ENCOUNTER
Left a detailed message on machine informing patient and to call back If she has questions     ----- Message from [address] [place] [address] sent at 8/29/2023 12:54 PM EDT -----  Please let patient know that blood work is good. Cholesterol has improved, continue heart healthy diet, low salt. Continue good hydration rest and nutrition.

## 2024-01-10 ENCOUNTER — OFFICE VISIT (OUTPATIENT)
Dept: GASTROENTEROLOGY | Facility: CLINIC | Age: 49
End: 2024-01-10
Payer: COMMERCIAL

## 2024-01-10 VITALS
BODY MASS INDEX: 23.41 KG/M2 | DIASTOLIC BLOOD PRESSURE: 86 MMHG | OXYGEN SATURATION: 98 % | HEIGHT: 61 IN | SYSTOLIC BLOOD PRESSURE: 124 MMHG | WEIGHT: 124 LBS | HEART RATE: 94 BPM

## 2024-01-10 DIAGNOSIS — K58.0 IRRITABLE BOWEL SYNDROME WITH DIARRHEA: ICD-10-CM

## 2024-01-10 PROCEDURE — 99213 OFFICE O/P EST LOW 20 MIN: CPT | Performed by: PHYSICIAN ASSISTANT

## 2024-01-10 RX ORDER — DICYCLOMINE HCL 20 MG
20 TABLET ORAL EVERY 6 HOURS PRN
Qty: 60 TABLET | Refills: 11 | Status: SHIPPED | OUTPATIENT
Start: 2024-01-10

## 2024-01-10 NOTE — PROGRESS NOTES
[facility] - Outpatient Follow-up Note  Ann Toney 48 y.o. female MRN: [id number]  Encounter: [id number]          ASSESSMENT AND PLAN:      1. Irritable bowel syndrome with diarrhea  She notes ongoing daily abdominal pain and diarrhea in the morning  She admits the pain is quite intense  Sometimes Dicyclomine helps and other times it does not  She is taking her probiotic but admits she is not consistent with fiber    Restart fiber BID  Try taking Dicyclomine routinely at bedtime  Trial of a low FODMAP diet    We briefly discussed trying Amitriptyline if not improving    EGD and Colonoscopy in 12/2022 were normal with negative biopsies    ______________________________________________________________________    SUBJECTIVE: 48-year-old female with a long history of irritable bowel syndrome as well as diverticulitis who presents for routine follow-up.  She reports that she continues with symptoms of abdominal pain and diarrhea.  She reports that the symptoms are most notable in the morning.  She feels that sometimes the pain actually wakes her up from sleep.  She will typically have 2 or 3 loose to watery stools in the morning with severe cramping and pain.  She tends to be okay for the rest of the day.  She denies any rectal bleeding.  She is not waking up at night to have bowel movements.  She has no nausea or vomiting.  She did have 1 episode of severe symptoms in December which may have been related to food poisoning.  She uses dicyclomine as needed.  Sometimes she thinks this helps other times she does not.  She notes that by the time she takes it in the morning her symptoms have already come on full force.  She is currently taking her probiotic but admits that she has swayed from taking her fiber.  She has not been taking this consistently for quite some time.  She has thankfully not had another episode of diverticulitis since prior to her last appointment last year.  She had  a normal colonoscopy and upper endoscopy in 2022 with negative biopsies for celiac, H. pylori and microscopic colitis.  She has really noted a correlation between her symptoms and her stress levels.  Thankfully, she is learning how to manage her stress and anxiety better.  She also notes a specific correlation between eating and her symptoms.  She admits that she has had a migraine for the past few days and so was not eating well.  Her symptoms have been better since this is the case.  She is not losing any weight unexpectedly.  She reports that she had been on amitriptyline sometime in the past for migraines.  She cannot specifically remember the circumstances surrounding why she stopped other than maybe it was not helping.      REVIEW OF SYSTEMS IS OTHERWISE NEGATIVE.      Historical Information   Past Medical History:   Diagnosis Date    GERD (gastroesophageal reflux disease)     IBS (irritable bowel syndrome)     Migraines     Varicella      Past Surgical History:   Procedure Laterality Date    ABDOMINOPLASTY N/A 10/8/2020    Procedure: ABDOMINOPLASTY;  Surgeon: German Prasad MD;  Location:  MAIN OR;  Service: Plastics    APPENDECTOMY      TUBAL LIGATION      US GUIDED BREAST BIOPSY RIGHT COMPLETE Right 2023     Social History   Social History     Substance and Sexual Activity   Alcohol Use Yes    Comment: A couple times a month     Social History     Substance and Sexual Activity   Drug Use Yes    Types: Marijuana     Social History     Tobacco Use   Smoking Status Former    Current packs/day: 0.00    Types: Cigarettes    Quit date: 2014    Years since quittin.0   Smokeless Tobacco Never     Family History   Problem Relation Age of Onset    No Known Problems Mother     Cancer Father     Cancer Paternal Grandmother     Ovarian cancer Neg Hx     Cervical cancer Neg Hx     Uterine cancer Neg Hx     Breast cancer Neg Hx        Meds/Allergies       Current Outpatient Medications:      "dicyclomine (BENTYL) 20 mg tablet    Allergies   Allergen Reactions    Prochlorperazine Hives and Other (See Comments)    Asa [Aspirin] Vomiting    Ondansetron Anxiety           Objective     Blood pressure 124/86, pulse 94, height 5' 1\" (1.549 m), weight 56.2 kg (124 lb), SpO2 98%, not currently breastfeeding. Body mass index is 23.43 kg/m².      PHYSICAL EXAM:      General Appearance:   Alert, cooperative, no distress   HEENT:   Normocephalic, atraumatic, anicteric.     Neck:  Supple, symmetrical, trachea midline   Lungs:   Clear to auscultation bilaterally; no rales, rhonchi or wheezing; respirations unlabored    Heart::   Regular rate and rhythm; no murmur, rub, or gallop.   Abdomen:   Soft, non-tender, non-distended; normal bowel sounds; no masses, no organomegaly    Genitalia:   Deferred    Rectal:   Deferred    Extremities:  No cyanosis, clubbing or edema    Pulses:  2+ and symmetric    Skin:  No jaundice, rashes, or lesions    Lymph nodes:  No palpable cervical lymphadenopathy        Lab Results:   No visits with results within 1 Day(s) from this visit.   Latest known visit with results is:   Hospital Outpatient Visit on 08/23/2023   Component Date Value    Case Report 08/23/2023                      Value:Surgical Pathology Report                         Case: [id number]                                   Authorizing Provider:  Ayaka Roman,   Collected:           08/23/2023 1413                                     GRAHAM                                                                         Ordering Location:     [facility] Received:            08/23/2023 [address] [place] [address]                                                              Pathologist:           Chloé Saucedo MD                                                             Specimen:    Breast, Right, US right brst bx 1000 2cfmn, 4 passes, 12g marquee                          Final " Diagnosis 08/23/2023                      Value:This result contains rich text formatting which cannot be displayed here.    Additional Information 08/23/2023                      Value:This result contains rich text formatting which cannot be displayed here.    Gross Description 08/23/2023                      Value:This result contains rich text formatting which cannot be displayed here.    Clinical Information 08/23/2023                      Value:Fixed in formalin. Please contact Martha Garcia at [facility] with results at [phone]    FINDINGS:   RIGHT  B) MASS  Mammo diagnostic right w 3d & cad: There is a low density, round mass with obscured margins seen in the outer central region of the right breast in the middle depth. The mass correlates with the prior mammogram finding.   US breast right limited (diagnostic): There is a 3 mm x 2 mm x 3 mm round, hypoechoic mass with indistinct margins seen in the outer central region of the right breast at 10 o'clock in the middle depth, 2 cm from the nipple. The mass correlates with the prior mammogram finding.         IMPRESSION:  Noncircumscribed mass in the 10 o'clock position of the right breast, 2 cm from the nipple.  Right ultrasound-guided core needle biopsy is recommended.           Radiology Results:   No results found.  Answers submitted by the patient for this visit:  Abdominal Pain Questionnaire (Submitted on 1/9/2024)  Chief Complaint: Abdominal pain  Chronicity: chronic  Onset: more than 1 year ago  Onset quality: sudden  Frequency: daily  Episode duration: 2 Hours  Progression since onset: waxing and waning  Pain location: generalized abdominal region, suprapubic region  Pain - numeric: 9/10  Pain quality: cramping  Radiates to: epigastric region, back, perineum  anorexia: No  arthralgias: No  belching: No  constipation: No  diarrhea: Yes  dysuria: No  fever: No  flatus: No  frequency: No  headaches: No  hematochezia: No  hematuria: No  melena:  No  myalgias: No  nausea: Yes  weight loss: No  vomiting: No  Aggravated by: being still, certain positions, eating  Relieved by: bowel movements, movement  Diagnostic workup: lower endoscopy, upper endoscopy

## 2024-01-30 DIAGNOSIS — K58.0 IRRITABLE BOWEL SYNDROME WITH DIARRHEA: ICD-10-CM

## 2024-01-30 RX ORDER — DICYCLOMINE HCL 20 MG
TABLET ORAL
Qty: 360 TABLET | Refills: 1 | Status: SHIPPED | OUTPATIENT
Start: 2024-01-30

## 2024-04-15 ENCOUNTER — OFFICE VISIT (OUTPATIENT)
Dept: FAMILY MEDICINE CLINIC | Facility: CLINIC | Age: 49
End: 2024-04-15
Payer: COMMERCIAL

## 2024-04-15 VITALS
SYSTOLIC BLOOD PRESSURE: 116 MMHG | HEART RATE: 99 BPM | WEIGHT: 120.25 LBS | BODY MASS INDEX: 22.7 KG/M2 | OXYGEN SATURATION: 99 % | HEIGHT: 61 IN | TEMPERATURE: 97.6 F | DIASTOLIC BLOOD PRESSURE: 80 MMHG

## 2024-04-15 DIAGNOSIS — F32.A ANXIETY AND DEPRESSION: ICD-10-CM

## 2024-04-15 DIAGNOSIS — F41.9 ANXIETY AND DEPRESSION: ICD-10-CM

## 2024-04-15 DIAGNOSIS — R23.2 HOT FLASHES: Primary | ICD-10-CM

## 2024-04-15 PROCEDURE — 99214 OFFICE O/P EST MOD 30 MIN: CPT | Performed by: NURSE PRACTITIONER

## 2024-04-15 RX ORDER — VENLAFAXINE HYDROCHLORIDE 37.5 MG/1
37.5 CAPSULE, EXTENDED RELEASE ORAL
Qty: 30 CAPSULE | Refills: 5 | Status: SHIPPED | OUTPATIENT
Start: 2024-04-15 | End: 2024-04-16 | Stop reason: SINTOL

## 2024-04-15 NOTE — PROGRESS NOTES
Name: Ann Toney      : 1975      MRN: [id number]  Encounter Provider: GRAHAM Nieto  Encounter Date: 4/15/2024   Encounter department: [facility]    Assessment & Plan     1. Hot flashes  Assessment & Plan:  Patient is postmenopausal.  Reports experiencing hot flashes at least 4-5 times a day.  Symptoms increased at night.  Will start Effexor.  Patient also does have increased anxiety discussed this will also help.  Referral made to psychiatry for talk therapy.  Discussed self-care.  Discussed nonpharmacological intervention for anxiety.    Orders:  -     venlafaxine (EFFEXOR-XR) 37.5 mg 24 hr capsule; Take 1 capsule (37.5 mg total) by mouth daily with breakfast    2. Anxiety and depression  Assessment & Plan:  Reports increasing episodes of anxiety.  Does have a long family history of mental health.  Discussed nonpharmacological interventions for anxiety and depression.  Will start Effexor, will start at 37.5.  Will evaluate in 4 weeks for effectiveness.  Continue with self-care.  Maintain good sleep, hydration, nutrition, exercise.  Denies being suicidal.  Is concerned that her anxiety and frequent hot flashes is creating problems within her relationship.  Referral made for therapy    Orders:  -     Ambulatory referral to Psych Services; Future      Depression Screening and Follow-up Plan: Patient's depression screening was positive with a PHQ-9 score of 6. Continue regular follow-up with their mental health provider who is managing their mental health condition(s).         Subjective      Patient is being seen with complaints of having increased hot flashes, agitation.  Patient is postmenopausal.  Reports that her postmenopausal symptoms have been interfering with her relationship.  Does have a long family history of anxiety, mental health.      Review of Systems   Constitutional: Negative.    HENT: Negative.     Eyes: Negative.    Respiratory: Negative.    "  Cardiovascular: Negative.    Gastrointestinal: Negative.    Endocrine: Negative.    Genitourinary:  Positive for menstrual problem.        Night sweats, hot flashes   Musculoskeletal: Negative.    Skin: Negative.    Allergic/Immunologic: Negative.    Neurological: Negative.    Psychiatric/Behavioral:  Positive for decreased concentration and dysphoric mood. The patient is nervous/anxious and is hyperactive.        Current Outpatient Medications on File Prior to Visit   Medication Sig   • dicyclomine (BENTYL) 20 mg tablet TAKE 1 TABLET (20 MG TOTAL) BY MOUTH EVERY 6 HOURS AS NEEDED FOR ABDOMINAL PAIN       Objective     /80 (BP Location: Left arm, Patient Position: Sitting, Cuff Size: Large)   Pulse 99   Temp 97.6 °F (36.4 °C) (Temporal)   Ht 5' 1\" (1.549 m)   Wt 54.5 kg (120 lb 4 oz)   SpO2 99%   BMI 22.72 kg/m²     Physical Exam  Vitals and nursing note reviewed.   Constitutional:       Appearance: Normal appearance. She is well-developed.   HENT:      Head: Normocephalic and atraumatic.      Right Ear: External ear normal.      Left Ear: External ear normal.   Eyes:      Pupils: Pupils are equal, round, and reactive to light.   Cardiovascular:      Rate and Rhythm: Normal rate and regular rhythm.      Pulses: Normal pulses.      Heart sounds: Normal heart sounds.   Pulmonary:      Effort: Pulmonary effort is normal.      Breath sounds: Normal breath sounds.   Abdominal:      General: Bowel sounds are normal.      Palpations: Abdomen is soft.   Musculoskeletal:         General: Normal range of motion.      Cervical back: Normal range of motion.   Skin:     General: Skin is warm and dry.   Neurological:      General: No focal deficit present.      Mental Status: She is alert and oriented to person, place, and time.   Psychiatric:         Attention and Perception: Attention and perception normal.         Mood and Affect: Mood is anxious.         Speech: Speech is rapid and pressured.         Behavior: " Behavior normal.         Thought Content: Thought content normal.         Cognition and Memory: Cognition and memory normal.         Judgment: Judgment normal.     I have spent a total time of 45 minutes on 04/16/24 in caring for this patient including Instructions for management and Importance of tx compliance.    GRAHAM Nieto

## 2024-04-15 NOTE — PATIENT INSTRUCTIONS
Take effexor daily  Follow up with psychiatry  Menopause   AMBULATORY CARE:   Menopause  is a normal stage in a person's life when monthly periods stop. You are considered to be in menopause when you have not had a period for a full year after the age of 40. Menopause usually occurs between ages 47 to 53. Perimenopause is a stage before menopause that may cause signs and symptoms similar to menopause. Perimenopause may start about 4 years before menopause.       What causes menopause:  Menopause starts when the ovaries stop making the female hormones estrogen and progesterone. After menopause, you are no longer able to become pregnant. Any of the following may trigger menopause or early menopause:  Surgery, including a hysterectomy or oophorectomy    Family history of early menopause    Smoking    Chemotherapy or pelvic radiation    Chromosome abnormalities, including Sauceda syndrome and Fragile X syndrome    Premature ovarian insufficiency (the ovaries stop producing eggs before age 40)    Common symptoms include the following:   Irregular menstrual cycles with heavy vaginal bleeding followed by decreased bleeding until it stops    Hot flashes (feeling warm, flushed, and sweaty)    Vaginal changes such as increased dryness    Mood changes such as anxiety, depression, or decreased desire to have sex    Trouble sleeping, joint pain, headaches    Brittle nails, hair on chin or chest where it is normally absent    Decrease in breast size and change in skin texture    Weight gain    Call your doctor or gynecologist if:   You have vaginal bleeding after menopause.    You have questions or concerns about your condition or care.    Treat or manage menopause symptoms:   Hormone replacement therapy (HRT) is medicine that replaces your low hormone levels.  HRT contains estrogen and sometimes progestin. HRT has benefits and risks:     HRT has several benefits.  HRT helps prevent osteoporosis, which decreases your risk for bone  fractures. HRT also protects you from colorectal cancer.    HRT also has some risks.  HRT increases your risk for breast cancer, blood clots, heart disease, a heart attack, or a stroke. If you are 65 years or older, HRT can also increase your risk for dementia. Your risk for uterine or endometrial cancer, gallbladder disease, and urinary incontinence is higher if you take estrogen-only HRT.    Manage hot flashes.  Hot flashes are brief periods of feeling very warm, flushed, and sweaty. Hot flashes can last from a few seconds to several minutes. They may happen many times during the day, and are common at night. Layer your clothing so that you can easily remove some clothing and cool yourself during a hot flash. Cold drinks may also be helpful. Non-hormone medicines can help relieve or prevent hot flashes. Examples include certain antidepressants, nerve medicines, and high blood pressure medicines.    Reduce vaginal dryness by using over-the-counter vaginal creams.  Vaginal dryness may cause you to have pain or discomfort during sex. Only use creams that are made for vaginal use. Do  not  use petroleum jelly. You may put an estrogen cream in and around your vagina. Estrogen cream may help decrease vaginal dryness and lower your risk of vaginal infections.    Continue to use birth control during perimenopause if you do not want to get pregnant.  You may need to use birth control until it has been 1 year since your periods stopped. Ask your healthcare provider when you can stop using birth control to prevent pregnancy.    Lead a healthy lifestyle:  After menopause, your risk for heart disease and bone loss increases. Ask about these and other ways to stay healthy:  Exercise regularly.  Exercise helps you maintain a healthy weight. Exercise can also help to control your blood pressure and cholesterol levels. Include weight-bearing exercise for strong bones. Weight bearing exercise is recommended for at least 30 minutes,  3 times a week. Ask your healthcare provider about the best exercise plan for you.         Eat a variety of healthy foods.  Include fruits, vegetables, whole grains (whole-wheat bread, pasta, and cereals), low-fat dairy, and lean protein foods (beans, poultry, and fish). Limit foods high in sodium (salt). Ask your healthcare provider for more information about a meal plan that is right for you.         Do not smoke.  If you smoke, it is never too late to quit. You are more likely to have a heart attack, lung disease, blood clots, and cancer if you smoke. Ask your healthcare provider for information if you need help quitting.    Take supplements as directed.  You may need extra calcium and vitamin D to help prevent osteoporosis.            Limit alcohol and caffeine.  Alcohol and caffeine may worsen your symptoms.    Follow up with your doctor as directed:  Write down your questions so you remember to ask them during your visits.  © Copyright [employer] 2023 Information is for End User's use only and may not be sold, redistributed or otherwise used for commercial purposes.  The above information is an  only. It is not intended as medical advice for individual conditions or treatments. Talk to your doctor, nurse or pharmacist before following any medical regimen to see if it is safe and effective for you.

## 2024-04-16 ENCOUNTER — TELEPHONE (OUTPATIENT)
Dept: FAMILY MEDICINE CLINIC | Facility: CLINIC | Age: 49
End: 2024-04-16

## 2024-04-16 DIAGNOSIS — R23.2 HOT FLASHES: Primary | ICD-10-CM

## 2024-04-16 RX ORDER — VENLAFAXINE 25 MG/1
12.5 TABLET ORAL DAILY
Qty: 30 TABLET | Refills: 0 | Status: SHIPPED | OUTPATIENT
Start: 2024-04-16

## 2024-04-16 NOTE — TELEPHONE ENCOUNTER
Hi, this is a message from Ann Vasquez. My date of birth is [birth year]. I was in yesterday on Monday April 15th to see Doctor Cailin and she prescribed me a new medication VENLAFAXI NEUM. It was for anxiety, depression and possibly help with my side effects. I took this medication last night and it has made me very ill. I wanted to speak to her about it before I took that second dose again tonight. Somebody could please give me a call back today and give me some direction. I would greatly appreciate it. I've had dizziness, vomiting, nausea, shakiness, just really uncomfortable and in bed all day today. So I would just like to speak to somebody. But like I said before, I take this medication. Again, my number is [phone]. And again, it's Ann Marti. Thank you.

## 2024-04-16 NOTE — ASSESSMENT & PLAN NOTE
Patient is postmenopausal.  Reports experiencing hot flashes at least 4-5 times a day.  Symptoms increased at night.  Will start Effexor.  Patient also does have increased anxiety discussed this will also help.  Referral made to psychiatry for talk therapy.  Discussed self-care.  Discussed nonpharmacological intervention for anxiety.

## 2024-04-16 NOTE — TELEPHONE ENCOUNTER
Spoke with patient. New rx sent for 12.5 mg daily times one week, Discussed management of sie effects

## 2024-04-16 NOTE — ASSESSMENT & PLAN NOTE
Reports increasing episodes of anxiety.  Does have a long family history of mental health.  Discussed nonpharmacological interventions for anxiety and depression.  Will start Effexor, will start at 37.5.  Will evaluate in 4 weeks for effectiveness.  Continue with self-care.  Maintain good sleep, hydration, nutrition, exercise.  Denies being suicidal.  Is concerned that her anxiety and frequent hot flashes is creating problems within her relationship.  Referral made for therapy

## 2024-04-24 ENCOUNTER — OFFICE VISIT (OUTPATIENT)
Dept: GASTROENTEROLOGY | Facility: CLINIC | Age: 49
End: 2024-04-24
Payer: COMMERCIAL

## 2024-04-24 VITALS
BODY MASS INDEX: 23.03 KG/M2 | SYSTOLIC BLOOD PRESSURE: 110 MMHG | WEIGHT: 122 LBS | DIASTOLIC BLOOD PRESSURE: 78 MMHG | HEART RATE: 65 BPM | HEIGHT: 61 IN | TEMPERATURE: 97.8 F | OXYGEN SATURATION: 98 %

## 2024-04-24 DIAGNOSIS — K58.0 IRRITABLE BOWEL SYNDROME WITH DIARRHEA: Primary | ICD-10-CM

## 2024-04-24 PROCEDURE — 99213 OFFICE O/P EST LOW 20 MIN: CPT | Performed by: PHYSICIAN ASSISTANT

## 2024-04-24 NOTE — PROGRESS NOTES
[facility] - Outpatient Follow-up Note  Ann Toney 48 y.o. female MRN: [id number]  Encounter: [id number]          ASSESSMENT AND PLAN:      1. Irritable bowel syndrome with diarrhea  She is doing well!  She is taking Digestive Advantage daily  She has increased her dietary fiber significantly  She has learned many foods which help and many foods she cannot tolerate  She is working hard to manage her stress/anxiety  She uses Dicyclomine almost every morning which  has been a big help    She has a very good understanding of how diet and stress as well as hormones related to her menstrual cycle are impacting her stomach    She will continue present management and f/u routinely in 1 year or sooner if necessary    Her last colonoscopy was in 2022 without polyps    ______________________________________________________________________    SUBJECTIVE: 48-year-old female with a history of diarrhea predominant irritable bowel syndrome, anxiety and migraine headaches who presents for routine follow-up.  At her last appointment she was still reporting significant almost daily abdominal pain with irregular bowel movements.  Since that time she has really worked to improve her diet.  She has reviewed the low FODMAP diet.  She started taking digestive advantage.  She has increased her dietary fiber.  She is working very hard to learn how to cope with and manage her stress and anxiety.  All of her symptoms have improved dramatically.  She is taking dicyclomine typically once a day in the morning.  She admits that this helped significantly as well.  She notes that she is only had 1 episode of severe pain since her last appointment.  She knows that if she does something like eat takeout she will have symptoms.  She has learned that she needs to avoid spicy foods.  She has increased her legume intake.  She is doing exceptionally well!      REVIEW OF SYSTEMS IS OTHERWISE NEGATIVE.      Historical  "Information   Past Medical History:   Diagnosis Date    Diverticulitis of colon     GERD (gastroesophageal reflux disease)     Headache(784.0)     IBS (irritable bowel syndrome)     Inflammatory bowel disease     Migraines     Varicella      Past Surgical History:   Procedure Laterality Date    ABDOMINOPLASTY N/A 10/8/2020    Procedure: ABDOMINOPLASTY;  Surgeon: German Prasad MD;  Location:  MAIN OR;  Service: Plastics    APPENDECTOMY      TUBAL LIGATION      US GUIDED BREAST BIOPSY RIGHT COMPLETE Right 2023     Social History   Social History     Substance and Sexual Activity   Alcohol Use Yes    Comment: On occasions     Social History     Substance and Sexual Activity   Drug Use Yes    Types: Marijuana    Comment: medical marijuana     Social History     Tobacco Use   Smoking Status Former    Current packs/day: 0.00    Average packs/day: 0.5 packs/day for 23.0 years (11.5 ttl pk-yrs)    Types: Cigarettes    Start date: 1992    Quit date: 2015    Years since quittin.8   Smokeless Tobacco Never     Family History   Problem Relation Age of Onset    No Known Problems Mother     Cancer Father     Cancer Paternal Grandmother     Ovarian cancer Neg Hx     Cervical cancer Neg Hx     Uterine cancer Neg Hx     Breast cancer Neg Hx        Meds/Allergies       Current Outpatient Medications:     dicyclomine (BENTYL) 20 mg tablet    venlafaxine (EFFEXOR) 25 mg tablet    Allergies   Allergen Reactions    Prochlorperazine Hives and Other (See Comments)    Asa [Aspirin] Vomiting    Ondansetron Anxiety           Objective     Blood pressure 110/78, pulse 65, temperature 97.8 °F (36.6 °C), temperature source Temporal, height 5' 1\" (1.549 m), weight 55.3 kg (122 lb), SpO2 98%, not currently breastfeeding. Body mass index is 23.05 kg/m².      PHYSICAL EXAM:      General Appearance:   Alert, cooperative, no distress   HEENT:   Normocephalic, atraumatic, anicteric.     Neck:  Supple, symmetrical, trachea " midline   Lungs:   Clear to auscultation bilaterally; no rales, rhonchi or wheezing; respirations unlabored    Heart::   Regular rate and rhythm; no murmur, rub, or gallop.   Abdomen:   Soft, non-tender, non-distended; normal bowel sounds; no masses, no organomegaly    Genitalia:   Deferred    Rectal:   Deferred    Extremities:  No cyanosis, clubbing or edema    Pulses:  2+ and symmetric    Skin:  No jaundice, rashes, or lesions    Lymph nodes:  No palpable cervical lymphadenopathy        Lab Results:   No visits with results within 1 Day(s) from this visit.   Latest known visit with results is:   Hospital Outpatient Visit on 08/23/2023   Component Date Value    Case Report 08/23/2023                      Value:Surgical Pathology Report                         Case: [id number]                                   Authorizing Provider:  Ayaka Roman,   Collected:           08/23/2023 1413                                     CRNP                                                                         Ordering Location:     [facility] Breast Received:            08/23/2023 [facility]                                     [facility]                                                              Pathologist:           Chloé Saucedo MD                                                             Specimen:    Breast, Right, US right brst bx 1000 2cfmn, 4 passes, 12g marquee                          Final Diagnosis 08/23/2023                      Value:This result contains rich text formatting which cannot be displayed here.    Additional Information 08/23/2023                      Value:This result contains rich text formatting which cannot be displayed here.    Gross Description 08/23/2023                      Value:This result contains rich text formatting which cannot be displayed here.    Clinical Information 08/23/2023                      Value:Fixed in formalin. Please contact Martha Garcia at [facility]  with results at [phone]    FINDINGS:   RIGHT  B) MASS  Mammo diagnostic right w 3d & cad: There is a low density, round mass with obscured margins seen in the outer central region of the right breast in the middle depth. The mass correlates with the prior mammogram finding.   US breast right limited (diagnostic): There is a 3 mm x 2 mm x 3 mm round, hypoechoic mass with indistinct margins seen in the outer central region of the right breast at 10 o'clock in the middle depth, 2 cm from the nipple. The mass correlates with the prior mammogram finding.         IMPRESSION:  Noncircumscribed mass in the 10 o'clock position of the right breast, 2 cm from the nipple.  Right ultrasound-guided core needle biopsy is recommended.           Radiology Results:   No results found.

## 2024-04-30 ENCOUNTER — TELEPHONE (OUTPATIENT)
Dept: PSYCHIATRY | Facility: CLINIC | Age: 49
End: 2024-04-30

## 2024-04-30 NOTE — TELEPHONE ENCOUNTER
Contacted patient in regards to Routine Referral in attempts to verify patient's needs of services and add patient to proper wait list. Writer left vm to call intake at [phone]    Referral closed due to unable to contact pt.

## 2024-05-14 ENCOUNTER — OFFICE VISIT (OUTPATIENT)
Dept: FAMILY MEDICINE CLINIC | Facility: CLINIC | Age: 49
End: 2024-05-14
Payer: COMMERCIAL

## 2024-05-14 VITALS
HEART RATE: 105 BPM | TEMPERATURE: 97.5 F | BODY MASS INDEX: 23.45 KG/M2 | OXYGEN SATURATION: 97 % | SYSTOLIC BLOOD PRESSURE: 120 MMHG | DIASTOLIC BLOOD PRESSURE: 84 MMHG | RESPIRATION RATE: 12 BRPM | WEIGHT: 124.2 LBS | HEIGHT: 61 IN

## 2024-05-14 DIAGNOSIS — M62.838 MUSCLE SPASM: ICD-10-CM

## 2024-05-14 DIAGNOSIS — F32.A ANXIETY AND DEPRESSION: ICD-10-CM

## 2024-05-14 DIAGNOSIS — R23.2 HOT FLASHES: Primary | ICD-10-CM

## 2024-05-14 DIAGNOSIS — F41.9 ANXIETY AND DEPRESSION: ICD-10-CM

## 2024-05-14 PROCEDURE — 99214 OFFICE O/P EST MOD 30 MIN: CPT | Performed by: NURSE PRACTITIONER

## 2024-05-14 RX ORDER — VENLAFAXINE HYDROCHLORIDE 37.5 MG/1
37.5 CAPSULE, EXTENDED RELEASE ORAL
Qty: 90 CAPSULE | Refills: 0 | Status: SHIPPED | OUTPATIENT
Start: 2024-05-14

## 2024-05-14 RX ORDER — METHOCARBAMOL 500 MG/1
500 TABLET, FILM COATED ORAL 2 TIMES DAILY PRN
Qty: 30 TABLET | Refills: 0 | Status: SHIPPED | OUTPATIENT
Start: 2024-05-14

## 2024-05-14 NOTE — ASSESSMENT & PLAN NOTE
Patient been taking 37.5 of Effexor.  Reports the medication has been working well.  Did have some difficulty loading on medication but currently symptoms are manageable.  Reports decreased incidence of hot flashes, anxiety.  Continue meds continue self-care   This RN to the room to provide pt with DC/Care/Follow up instructions. Pt verbalizes understanding. Pt ambulates with steady gait, chest rise and fall equal, resps easy unlabored, no signs of distress noted.       Radha Hartley RN  09/14/22 [id number]

## 2024-05-14 NOTE — PROGRESS NOTES
Name: Ann Toney      : 1975      MRN: [id number]  Encounter Provider: GRAHAM Nieto  Encounter Date: 2024   Encounter department: [facility]    Assessment & Plan     1. Hot flashes  Assessment & Plan:  Patient been taking 37.5 of Effexor.  Reports the medication has been working well.  Did have some difficulty loading on medication but currently symptoms are manageable.  Reports decreased incidence of hot flashes, anxiety.  Continue meds continue self-care    Orders:  -     venlafaxine (EFFEXOR-XR) 37.5 mg 24 hr capsule; Take 1 capsule (37.5 mg total) by mouth daily with breakfast    2. Muscle spasm  Assessment & Plan:  Pain diagnosed with occipital neuralgia.  Has a neck spasm neck pain.  May use heat muscle rub.  Robaxin for acute levels of pain    Orders:  -     methocarbamol (ROBAXIN) 500 mg tablet; Take 1 tablet (500 mg total) by mouth 2 (two) times a day as needed for muscle spasms    3. Anxiety and depression  Assessment & Plan:  Patient has been taking Effexor to help flashes but reports it has also been helping with anxiety.  Has decreased amount of social anxiety, panic attacks.  Continue nonpharmacological interventions continue therapy, exercise.  Continue medication           Subjective      Patient is here for follow-up on anxiety and hot flashes.  Has been taking Effexor.  Has titrated up to 37.5.  Reports doing well on the medication      Review of Systems   Constitutional: Negative.         Hot flashes   HENT: Negative.     Eyes: Negative.    Respiratory: Negative.     Cardiovascular: Negative.    Gastrointestinal: Negative.    Endocrine: Negative.    Genitourinary: Negative.    Musculoskeletal: Negative.    Skin: Negative.    Allergic/Immunologic: Negative.    Neurological: Negative.    Psychiatric/Behavioral:  Positive for decreased concentration. The patient is nervous/anxious.        Current Outpatient Medications on File Prior to Visit  "  Medication Sig   • dicyclomine (BENTYL) 20 mg tablet TAKE 1 TABLET (20 MG TOTAL) BY MOUTH EVERY 6 HOURS AS NEEDED FOR ABDOMINAL PAIN (Patient taking differently: prn)   • [DISCONTINUED] venlafaxine (EFFEXOR) 25 mg tablet Take 0.5 tablets (12.5 mg total) by mouth in the morning       Objective     /84   Pulse 105   Temp 97.5 °F (36.4 °C) (Temporal)   Resp 12   Ht 5' 1\" (1.549 m)   Wt 56.3 kg (124 lb 3.2 oz)   SpO2 97%   BMI 23.47 kg/m²     Physical Exam  Vitals and nursing note reviewed.   Constitutional:       Appearance: She is well-developed.   HENT:      Head: Normocephalic and atraumatic.      Right Ear: External ear normal.      Left Ear: External ear normal.   Eyes:      Pupils: Pupils are equal, round, and reactive to light.   Cardiovascular:      Rate and Rhythm: Normal rate and regular rhythm.   Pulmonary:      Effort: Pulmonary effort is normal.   Abdominal:      General: Bowel sounds are normal.      Palpations: Abdomen is soft.   Musculoskeletal:         General: Normal range of motion.      Cervical back: Normal range of motion.   Skin:     General: Skin is warm and dry.   Neurological:      Mental Status: She is alert and oriented to person, place, and time.       GRAHAM Nieto    "

## 2024-05-14 NOTE — PATIENT INSTRUCTIONS
Menopause   AMBULATORY CARE:   Menopause  is a normal stage in a person's life when monthly periods stop. You are considered to be in menopause when you have not had a period for a full year after the age of 40. Menopause usually occurs between ages 47 to 53. Perimenopause is a stage before menopause that may cause signs and symptoms similar to menopause. Perimenopause may start about 4 years before menopause.       What causes menopause:  Menopause starts when the ovaries stop making the female hormones estrogen and progesterone. After menopause, you are no longer able to become pregnant. Any of the following may trigger menopause or early menopause:  Surgery, including a hysterectomy or oophorectomy    Family history of early menopause    Smoking    Chemotherapy or pelvic radiation    Chromosome abnormalities, including Sauceda syndrome and Fragile X syndrome    Premature ovarian insufficiency (the ovaries stop producing eggs before age 40)    Common symptoms include the following:   Irregular menstrual cycles with heavy vaginal bleeding followed by decreased bleeding until it stops    Hot flashes (feeling warm, flushed, and sweaty)    Vaginal changes such as increased dryness    Mood changes such as anxiety, depression, or decreased desire to have sex    Trouble sleeping, joint pain, headaches    Brittle nails, hair on chin or chest where it is normally absent    Decrease in breast size and change in skin texture    Weight gain    Call your doctor or gynecologist if:   You have vaginal bleeding after menopause.    You have questions or concerns about your condition or care.    Treat or manage menopause symptoms:   Hormone replacement therapy (HRT) is medicine that replaces your low hormone levels.  HRT contains estrogen and sometimes progestin. HRT has benefits and risks:     HRT has several benefits.  HRT helps prevent osteoporosis, which decreases your risk for bone fractures. HRT also protects you from  colorectal cancer.    HRT also has some risks.  HRT increases your risk for breast cancer, blood clots, heart disease, a heart attack, or a stroke. If you are 65 years or older, HRT can also increase your risk for dementia. Your risk for uterine or endometrial cancer, gallbladder disease, and urinary incontinence is higher if you take estrogen-only HRT.    Manage hot flashes.  Hot flashes are brief periods of feeling very warm, flushed, and sweaty. Hot flashes can last from a few seconds to several minutes. They may happen many times during the day, and are common at night. Layer your clothing so that you can easily remove some clothing and cool yourself during a hot flash. Cold drinks may also be helpful. Non-hormone medicines can help relieve or prevent hot flashes. Examples include certain antidepressants, nerve medicines, and high blood pressure medicines.    Reduce vaginal dryness by using over-the-counter vaginal creams.  Vaginal dryness may cause you to have pain or discomfort during sex. Only use creams that are made for vaginal use. Do  not  use petroleum jelly. You may put an estrogen cream in and around your vagina. Estrogen cream may help decrease vaginal dryness and lower your risk of vaginal infections.    Continue to use birth control during perimenopause if you do not want to get pregnant.  You may need to use birth control until it has been 1 year since your periods stopped. Ask your healthcare provider when you can stop using birth control to prevent pregnancy.    Lead a healthy lifestyle:  After menopause, your risk for heart disease and bone loss increases. Ask about these and other ways to stay healthy:  Exercise regularly.  Exercise helps you maintain a healthy weight. Exercise can also help to control your blood pressure and cholesterol levels. Include weight-bearing exercise for strong bones. Weight bearing exercise is recommended for at least 30 minutes, 3 times a week. Ask your healthcare  provider about the best exercise plan for you.         Eat a variety of healthy foods.  Include fruits, vegetables, whole grains (whole-wheat bread, pasta, and cereals), low-fat dairy, and lean protein foods (beans, poultry, and fish). Limit foods high in sodium (salt). Ask your healthcare provider for more information about a meal plan that is right for you.         Do not smoke.  If you smoke, it is never too late to quit. You are more likely to have a heart attack, lung disease, blood clots, and cancer if you smoke. Ask your healthcare provider for information if you need help quitting.    Take supplements as directed.  You may need extra calcium and vitamin D to help prevent osteoporosis.            Limit alcohol and caffeine.  Alcohol and caffeine may worsen your symptoms.    Follow up with your doctor as directed:  Write down your questions so you remember to ask them during your visits.  © Copyright [employer] 2023 Information is for End User's use only and may not be sold, redistributed or otherwise used for commercial purposes.  The above information is an  only. It is not intended as medical advice for individual conditions or treatments. Talk to your doctor, nurse or pharmacist before following any medical regimen to see if it is safe and effective for you.

## 2024-05-14 NOTE — ASSESSMENT & PLAN NOTE
Patient has been taking Effexor to help flashes but reports it has also been helping with anxiety.  Has decreased amount of social anxiety, panic attacks.  Continue nonpharmacological interventions continue therapy, exercise.  Continue medication

## 2024-05-14 NOTE — ASSESSMENT & PLAN NOTE
Pain diagnosed with occipital neuralgia.  Has a neck spasm neck pain.  May use heat muscle rub.  Robaxin for acute levels of pain

## 2024-06-08 DIAGNOSIS — Z00.6 ENCOUNTER FOR EXAMINATION FOR NORMAL COMPARISON OR CONTROL IN CLINICAL RESEARCH PROGRAM: ICD-10-CM

## 2024-06-09 DIAGNOSIS — M62.838 MUSCLE SPASM: ICD-10-CM

## 2024-06-10 RX ORDER — METHOCARBAMOL 500 MG/1
500 TABLET, FILM COATED ORAL 2 TIMES DAILY PRN
Qty: 30 TABLET | Refills: 0 | Status: SHIPPED | OUTPATIENT
Start: 2024-06-10

## 2024-06-18 ENCOUNTER — APPOINTMENT (OUTPATIENT)
Dept: LAB | Facility: CLINIC | Age: 49
End: 2024-06-18

## 2024-06-18 DIAGNOSIS — Z00.6 ENCOUNTER FOR EXAMINATION FOR NORMAL COMPARISON OR CONTROL IN CLINICAL RESEARCH PROGRAM: ICD-10-CM

## 2024-06-18 PROCEDURE — 36415 COLL VENOUS BLD VENIPUNCTURE: CPT

## 2024-07-02 LAB
APOB+LDLR+PCSK9 GENE MUT ANL BLD/T: NOT DETECTED
BRCA1+BRCA2 DEL+DUP + FULL MUT ANL BLD/T: NOT DETECTED
MLH1+MSH2+MSH6+PMS2 GN DEL+DUP+FUL M: NOT DETECTED

## 2024-07-30 DIAGNOSIS — M62.838 MUSCLE SPASM: ICD-10-CM

## 2024-07-30 RX ORDER — METHOCARBAMOL 500 MG/1
500 TABLET, FILM COATED ORAL 2 TIMES DAILY PRN
Qty: 30 TABLET | Refills: 0 | Status: SHIPPED | OUTPATIENT
Start: 2024-07-30

## 2024-08-15 ENCOUNTER — NURSE TRIAGE (OUTPATIENT)
Age: 49
End: 2024-08-15

## 2024-08-15 NOTE — PROGRESS NOTES
Diagnoses and all orders for this visit:    Breast pain  -     Mammo diagnostic right w 3d & cad; Future      Overdue for annual exam. Please schedule   Call to schedule  diagnostic mammogram  May use warm compresses for tenderness  May use Tylenol or ibuprofen for discomfort  Try evening of primrose oil for breast tenderness  Discussed hormonal fluctuations causing breast tenderness during menstrual cycles    See after visit summary for further information and recommendations to the above mentioned subjects which we may or may not have covered in detail during your visit     Subjective    CC: Problem visit     Ann Toney is a 48 y.o. female   Presents with concerns for right breast pain, her  was touching her breasts on Thursday, she felt some pain in the outer right breast.  She currently has her menstrual cycle   Hx of right breast  mass at 10 o clock, diagnostic mammo with US & biopsy, clip present   Benign finding: the pathology findings indicate fibroadenomatoid change.   Area of concern today is the same area evaluated and biopsied 2023      Patient's last menstrual period was 08/15/2024 (exact date).    Past Medical History:   Diagnosis Date    Diverticulitis of colon     GERD (gastroesophageal reflux disease)     Headache(784.0)     IBS (irritable bowel syndrome)     Inflammatory bowel disease     Migraines     Varicella      Past Surgical History:   Procedure Laterality Date    ABDOMINOPLASTY N/A 10/8/2020    Procedure: ABDOMINOPLASTY;  Surgeon: German Prasad MD;  Location:  MAIN OR;  Service: Plastics    APPENDECTOMY      TUBAL LIGATION      US GUIDED BREAST BIOPSY RIGHT COMPLETE Right 2023       Immunization History   Administered Date(s) Administered    H1N1, All Formulations 2010       Family History   Problem Relation Age of Onset    No Known Problems Mother     Cancer Father     Cancer Paternal Grandmother     Ovarian cancer Neg Hx     Cervical cancer Neg Hx      Uterine cancer Neg Hx     Breast cancer Neg Hx      Social History     Tobacco Use    Smoking status: Former     Current packs/day: 0.00     Average packs/day: 0.5 packs/day for 23.0 years (11.5 ttl pk-yrs)     Types: Cigarettes     Start date: 1992     Quit date: 2015     Years since quittin.1    Smokeless tobacco: Never   Vaping Use    Vaping status: Never Used   Substance Use Topics    Alcohol use: Yes     Comment: On occasions    Drug use: Yes     Types: Marijuana     Comment: medical marijuana       Current Outpatient Medications:     dicyclomine (BENTYL) 20 mg tablet, TAKE 1 TABLET (20 MG TOTAL) BY MOUTH EVERY 6 HOURS AS NEEDED FOR ABDOMINAL PAIN (Patient taking differently: prn), Disp: 360 tablet, Rfl: 1    methocarbamol (ROBAXIN) 500 mg tablet, TAKE 1 TABLET (500 MG TOTAL) BY MOUTH 2 (TWO) TIMES A DAY AS NEEDED FOR MUSCLE SPASMS, Disp: 30 tablet, Rfl: 0    venlafaxine (EFFEXOR-XR) 37.5 mg 24 hr capsule, Take 1 capsule (37.5 mg total) by mouth daily with breakfast, Disp: 90 capsule, Rfl: 0  Patient Active Problem List    Diagnosis Date Noted    Muscle spasm 2024    Hot flashes 2024    Chronic daily headache 2022    Occipital neuralgia of right side 2022    Hypovitaminosis D 2022    Annual physical exam 2022    Perimenopausal symptoms 2021    Encounter to establish care 2021    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    Anxiety and depression 2012    Migraine 2011    Vitamin D deficiency 2011       Allergies   Allergen Reactions    Prochlorperazine Hives and Other (See Comments)    Asa [Aspirin] Vomiting    Ondansetron Anxiety       OB History    Para Term  AB Living   3 3 3     3   SAB IAB Ectopic Multiple Live Births           3      # Outcome Date GA Lbr Domingo/2nd Weight Sex Type Anes PTL Lv   3 Term     F Vag-Spont   XAVI   2 Term     M Vag-Spont   XAVI   1 Term     M Vag-Spont   XAVI       Vitals:    24 1532   BP: 124/70  "  BP Location: Left arm   Patient Position: Sitting   Cuff Size: Large   Weight: 57.6 kg (127 lb)   Height: 5' 1\" (1.549 m)     Body mass index is 24 kg/m².    Review of Systems     Constitutional: Negative for chills, fatigue, fever, headaches, visual disturbances, and unexpected weight change.   Respiratory: Negative for cough, & shortness of breath.  Cardiovascular: Negative for chest pain. .    Gastrointestinal: Negative for Abd pain, nausea & vomiting, constipation and diarrhea.   Genitourinary: Negative for difficulty urinating, dysuria, hematuria, dyspareunia, unusual vaginal bleeding or discharge  Skin: Negative skin changes    Physical Exam     Constitutional: Alert & Oriented x3, well-developed and well-nourished. No distress.   HENT: Atraumatic, Normocephalic,   Neck: Normal range of motion.   Pulmonary: Effort normal.   Abdominal: Soft. No tenderness or masses  Musculoskeletal: Normal ROM  Skin: Warm & Dry  Psychological: Normal mood, thought content, behavior & judgement     Breasts:   Right: + tenderness right outer at 10 o'clock, tissue soft without masses,  skin changes or nipple discharge. No areas of erythema  No subclavicular, axillary, pectoral adenopathy  Left:  tissue soft without masses, tenderness, skin changes or nipple discharge. No areas of erythema or pain. No subclavicular, axillary, pectoral adenopathy         "

## 2024-08-15 NOTE — TELEPHONE ENCOUNTER
"Ann reports she had pain when spouse palpated right breast- spouse also advised Ann he felt a breast lump.  Ann is concerned- Prior h/o biopsy on right breast 2023= benign.    No availability in book it. Warm transfer to [facility]      Reason for Disposition  • Breast lump    Answer Assessment - Initial Assessment Questions  1. SYMPTOM: \"What's the main symptom you're concerned about?\"  (e.g., lump, pain, rash, nipple discharge)      Tenderness/pain/lump  2. LOCATION: \"Where is the tenderness located?\"      Right breast  3. ONSET: \"When did tenderness  start?\"      Tuesday evening- 2 days ago  4. PRIOR HISTORY: \"Do you have any history of prior problems with your breasts?\" (e.g., lumps, cancer, fibrocystic breast disease)      Yes, biopsy 2023 right breast  5. CAUSE: \"What do you think is causing this symptom?\"      unknown  6. OTHER SYMPTOMS: \"Do you have any other symptoms?\" (e.g., fever, breast pain, redness or rash, nipple discharge)      Pain with palpation  7. PREGNANCY-BREASTFEEDING: \"Is there any chance you are pregnant?\" \"When was your last menstrual period?\" \"Are you breastfeeding?\"      LMP- maybe 2 weeks ago    Protocols used: Breast Symptoms-ADULT-[place]    "

## 2024-08-15 NOTE — PATIENT INSTRUCTIONS
"Breast Pain   We recommend wearing a very supportive bra, sports bra's are the best, even at night when sleeping, you may use warm compresses or ice, see which one helps with relief of pain and continue with that regimen.   Use Coconut oil and massage breasts.  Tylenol and/or ibuprofen, may be used for pain relief, try OTC aspercreme which has aspirin in it.    Chamomile tea or the extract, may help  Eliminate caffeine for a while, see if that helps.  Evening primrose oil, and Vit E are other options to take orally, this doesn't help everyone but you can try.    You may need to see your PCP if you are having neck or upper back pain which can radiate to the breasts. If your breast pain continues after trying these recommendations, we can refer you to a Breast Specialist physician if the pain becomes disruptive to your daily activities.      Evening of primrose oil: Take one 500 mg capsule daily for 1 week. If tolerated, increase to one 500 mg twice a day. If after 1 month you have not experienced any relief you may double the dose, taking two 500mg capsule in the morning and the evening. If you still continue with pain after trying this, you most likely will not benefit from use. Patient Education     Common breast problems   The Basics   Written by the doctors and editors at [facility]   What are some common breast problems? -- People can have different kinds of problems with their breasts. The important thing to know is that in most but not all cases, breast-related problems are not caused by breast cancer. Even so, if you develop any problem with your breasts, see a doctor or nurse to have it checked out.  Some common breast problems include:   Breast lumpiness   Single breast lump (which doctors call a \"mass\")   Breast pain   Breast tenderness   Nipple discharge (meaning fluid leaks from the nipples, such as clear, white, yellow, green, or red fluid)   Nipple inversion (meaning the nipples point inward instead of " outward) and that is new and has occurred in just 1 breast   Changes in the skin of the breast, such as redness or puckering  These problems can happen at any age. If you develop any of these problems, see your doctor or nurse. Breast problems are not usually an emergency, but you should get checked out as soon as possible. If there is something serious going on, it's important to find out quickly. Your doctor or nurse might be able to tell what's happening just by doing an exam. If not, they can order some tests, or send you to a specialist.  Which tests might I need? -- Your doctor or nurse will decide which tests you need based on your individual situation. Common tests used to evaluate breast problems are listed below:   Breast ultrasound - This is an imaging test that uses sound waves to create pictures of the inside of your breast. Among other things, it can show if a lump is solid or filled with fluid.   Breast biopsy - During a biopsy, a doctor takes 1 or more tiny samples of suspicious breast tissue using a needle. The samples then go to the lab to be checked for cancer or other problems.   Mammogram - Mammograms are special X-rays of the breast. They can help doctors find breast cancer.  What could be causing the problem? -- The breast symptoms listed above could be caused by a number of problems, most of which are not serious. For example, normal hormone changes in your monthly cycle can sometimes cause breast pain or even lumps that turn out to be nothing. Still, new breast symptoms can be a sign of cancer, so it's important to see a doctor if you develop any symptom.  All topics are updated as new evidence becomes available and our peer review process is complete.  This topic retrieved from [employer] on: Feb 26, 2024.  Topic 69351 Version 13.0  Release: 32.2.4 - C32.56  © 2024 UpToDate, Inc. and/or its affiliates. All rights reserved.  Consumer Information Use and Disclaimer   Disclaimer: This  generalized information is a limited summary of diagnosis, treatment, and/or medication information. It is not meant to be comprehensive and should be used as a tool to help the user understand and/or assess potential diagnostic and treatment options. It does NOT include all information about conditions, treatments, medications, side effects, or risks that may apply to a specific patient. It is not intended to be medical advice or a substitute for the medical advice, diagnosis, or treatment of a health care provider based on the health care provider's examination and assessment of a patient's specific and unique circumstances. Patients must speak with a health care provider for complete information about their health, medical questions, and treatment options, including any risks or benefits regarding use of medications. This information does not endorse any treatments or medications as safe, effective, or approved for treating a specific patient. UpToDate, Inc. and its affiliates disclaim any warranty or liability relating to this information or the use thereof.The use of this information is governed by the Terms of Use, available at https://www.wolterskluwer.com/en/know/clinical-effectiveness-terms. 2024© UpToDate, Inc. and its affiliates and/or licensors. All rights reserved.  Copyright   © 2024 UpToDate, Inc. and/or its affiliates. All rights reserved.

## 2024-08-15 NOTE — TELEPHONE ENCOUNTER
----- Message from Carmencita MAC sent at 8/15/2024  7:47 AM EDT -----  Pt has found a lump in her breast, pt at [facility]

## 2024-08-20 ENCOUNTER — OFFICE VISIT (OUTPATIENT)
Dept: OBGYN CLINIC | Facility: CLINIC | Age: 49
End: 2024-08-20
Payer: COMMERCIAL

## 2024-08-20 VITALS
SYSTOLIC BLOOD PRESSURE: 124 MMHG | DIASTOLIC BLOOD PRESSURE: 70 MMHG | WEIGHT: 127 LBS | BODY MASS INDEX: 23.98 KG/M2 | HEIGHT: 61 IN

## 2024-08-20 DIAGNOSIS — N64.4 BREAST PAIN: Primary | ICD-10-CM

## 2024-08-20 PROCEDURE — 99213 OFFICE O/P EST LOW 20 MIN: CPT | Performed by: OBSTETRICS & GYNECOLOGY

## 2024-08-21 ENCOUNTER — APPOINTMENT (OUTPATIENT)
Dept: LAB | Facility: CLINIC | Age: 49
End: 2024-08-21
Payer: COMMERCIAL

## 2024-08-21 ENCOUNTER — APPOINTMENT (OUTPATIENT)
Dept: RADIOLOGY | Facility: CLINIC | Age: 49
End: 2024-08-21
Payer: COMMERCIAL

## 2024-08-21 ENCOUNTER — OFFICE VISIT (OUTPATIENT)
Dept: FAMILY MEDICINE CLINIC | Facility: CLINIC | Age: 49
End: 2024-08-21
Payer: COMMERCIAL

## 2024-08-21 VITALS
TEMPERATURE: 97.5 F | SYSTOLIC BLOOD PRESSURE: 120 MMHG | DIASTOLIC BLOOD PRESSURE: 80 MMHG | WEIGHT: 129.4 LBS | HEIGHT: 61 IN | OXYGEN SATURATION: 98 % | HEART RATE: 98 BPM | BODY MASS INDEX: 24.43 KG/M2

## 2024-08-21 DIAGNOSIS — F41.9 ANXIETY AND DEPRESSION: ICD-10-CM

## 2024-08-21 DIAGNOSIS — F32.A ANXIETY AND DEPRESSION: ICD-10-CM

## 2024-08-21 DIAGNOSIS — M25.511 CHRONIC RIGHT SHOULDER PAIN: ICD-10-CM

## 2024-08-21 DIAGNOSIS — G89.29 CHRONIC RIGHT SHOULDER PAIN: ICD-10-CM

## 2024-08-21 DIAGNOSIS — N95.1 PERIMENOPAUSAL SYMPTOMS: ICD-10-CM

## 2024-08-21 DIAGNOSIS — Z00.00 ANNUAL PHYSICAL EXAM: ICD-10-CM

## 2024-08-21 DIAGNOSIS — Z00.00 ANNUAL PHYSICAL EXAM: Primary | ICD-10-CM

## 2024-08-21 DIAGNOSIS — E55.9 HYPOVITAMINOSIS D: ICD-10-CM

## 2024-08-21 DIAGNOSIS — Z12.31 ENCOUNTER FOR SCREENING MAMMOGRAM FOR BREAST CANCER: ICD-10-CM

## 2024-08-21 LAB
25(OH)D3 SERPL-MCNC: 17 NG/ML (ref 30–100)
ALBUMIN SERPL BCG-MCNC: 4.3 G/DL (ref 3.5–5)
ALP SERPL-CCNC: 44 U/L (ref 34–104)
ALT SERPL W P-5'-P-CCNC: 15 U/L (ref 7–52)
ANION GAP SERPL CALCULATED.3IONS-SCNC: 8 MMOL/L (ref 4–13)
AST SERPL W P-5'-P-CCNC: 16 U/L (ref 13–39)
BASOPHILS # BLD AUTO: 0.04 THOUSANDS/ÂΜL (ref 0–0.1)
BASOPHILS NFR BLD AUTO: 1 % (ref 0–1)
BILIRUB SERPL-MCNC: 0.31 MG/DL (ref 0.2–1)
BUN SERPL-MCNC: 13 MG/DL (ref 5–25)
CALCIUM SERPL-MCNC: 9.1 MG/DL (ref 8.4–10.2)
CHLORIDE SERPL-SCNC: 105 MMOL/L (ref 96–108)
CHOLEST SERPL-MCNC: 277 MG/DL
CO2 SERPL-SCNC: 29 MMOL/L (ref 21–32)
CREAT SERPL-MCNC: 0.71 MG/DL (ref 0.6–1.3)
EOSINOPHIL # BLD AUTO: 0.28 THOUSAND/ÂΜL (ref 0–0.61)
EOSINOPHIL NFR BLD AUTO: 4 % (ref 0–6)
ERYTHROCYTE [DISTWIDTH] IN BLOOD BY AUTOMATED COUNT: 12.4 % (ref 11.6–15.1)
GFR SERPL CREATININE-BSD FRML MDRD: 101 ML/MIN/1.73SQ M
GLUCOSE P FAST SERPL-MCNC: 91 MG/DL (ref 65–99)
HCT VFR BLD AUTO: 41.7 % (ref 34.8–46.1)
HDLC SERPL-MCNC: 53 MG/DL
HGB BLD-MCNC: 13.7 G/DL (ref 11.5–15.4)
IMM GRANULOCYTES # BLD AUTO: 0.02 THOUSAND/UL (ref 0–0.2)
IMM GRANULOCYTES NFR BLD AUTO: 0 % (ref 0–2)
LDLC SERPL CALC-MCNC: 180 MG/DL (ref 0–100)
LYMPHOCYTES # BLD AUTO: 2.54 THOUSANDS/ÂΜL (ref 0.6–4.47)
LYMPHOCYTES NFR BLD AUTO: 34 % (ref 14–44)
MCH RBC QN AUTO: 33.2 PG (ref 26.8–34.3)
MCHC RBC AUTO-ENTMCNC: 32.9 G/DL (ref 31.4–37.4)
MCV RBC AUTO: 101 FL (ref 82–98)
MONOCYTES # BLD AUTO: 0.54 THOUSAND/ÂΜL (ref 0.17–1.22)
MONOCYTES NFR BLD AUTO: 7 % (ref 4–12)
NEUTROPHILS # BLD AUTO: 4.02 THOUSANDS/ÂΜL (ref 1.85–7.62)
NEUTS SEG NFR BLD AUTO: 54 % (ref 43–75)
NONHDLC SERPL-MCNC: 224 MG/DL
NRBC BLD AUTO-RTO: 0 /100 WBCS
PLATELET # BLD AUTO: 251 THOUSANDS/UL (ref 149–390)
PMV BLD AUTO: 11.1 FL (ref 8.9–12.7)
POTASSIUM SERPL-SCNC: 4.4 MMOL/L (ref 3.5–5.3)
PROT SERPL-MCNC: 6.6 G/DL (ref 6.4–8.4)
RBC # BLD AUTO: 4.13 MILLION/UL (ref 3.81–5.12)
SODIUM SERPL-SCNC: 142 MMOL/L (ref 135–147)
TRIGL SERPL-MCNC: 218 MG/DL
TSH SERPL DL<=0.05 MIU/L-ACNC: 0.86 UIU/ML (ref 0.45–4.5)
WBC # BLD AUTO: 7.44 THOUSAND/UL (ref 4.31–10.16)

## 2024-08-21 PROCEDURE — 80053 COMPREHEN METABOLIC PANEL: CPT

## 2024-08-21 PROCEDURE — 73030 X-RAY EXAM OF SHOULDER: CPT

## 2024-08-21 PROCEDURE — 82306 VITAMIN D 25 HYDROXY: CPT

## 2024-08-21 PROCEDURE — 85025 COMPLETE CBC W/AUTO DIFF WBC: CPT

## 2024-08-21 PROCEDURE — 84443 ASSAY THYROID STIM HORMONE: CPT

## 2024-08-21 PROCEDURE — 80061 LIPID PANEL: CPT

## 2024-08-21 PROCEDURE — 99396 PREV VISIT EST AGE 40-64: CPT | Performed by: NURSE PRACTITIONER

## 2024-08-21 PROCEDURE — 36415 COLL VENOUS BLD VENIPUNCTURE: CPT

## 2024-08-21 NOTE — PROGRESS NOTES
Adult Annual Physical  Name: Ann Toney      : 1975      MRN: [id number]  Encounter Provider: GRAHAM Nieto  Encounter Date: 2024   Encounter department: [facility]    Assessment & Plan   1. Annual physical exam  Assessment & Plan:  Annual physical completed.  Blood work ordered.  Patient reports doing well has had some improvement with incidence of hot flashes, management of anxiety.  Discussed self-care.  Continue heart healthy diet, exercise, fluid hydration.  Mammogram ordered.  Up-to-date with cervical cancer screening and colon cancer screening  Orders:  -     CBC and differential; Future  -     Comprehensive metabolic panel; Future  -     Lipid panel; Future  -     TSH, 3rd generation with Free T4 reflex; Future  2. Encounter for screening mammogram for breast cancer  -     Mammo screening bilateral w 3d & cad; Future; Expected date: 2024  3. Hypovitaminosis D  -     Vitamin D 25 hydroxy; Future  4. Chronic right shoulder pain  Assessment & Plan:  Reports ongoing problems with right shoulder, denies any acute injury.  X-ray ordered.  Referral made to physical therapy May use heat muscle rub.  Orders:  -     XR shoulder 2+ vw right; Future; Expected date: 2024  -     Ambulatory Referral to Physical Therapy; Future  5. Anxiety and depression  Assessment & Plan:  Patient has been using 37.5 mg of Effexor.  Reports less incidence of hot flashes.  Reports anxiety is better controlled.  Will keep at this dose, discussed may increase if it is not therapeutic.  Discussed long-term side effects of medication.  Patient uses nonpharmacological interventions together with medications to help with management of anxiety  6. Perimenopausal symptoms  Assessment & Plan:  Patient reports using Effexor, it has helped significantly with incidence of reducing hot flashes.  Has also helped with management of anxiety.  Continue medication continue  "self-care    Immunizations and preventive care screenings were discussed with patient today. Appropriate education was printed on patient's after visit summary.    Counseling:  Alcohol/drug use: discussed moderation in alcohol intake, the recommendations for healthy alcohol use, and avoidance of illicit drug use.  Dental Health: discussed importance of regular tooth brushing, flossing, and dental visits.  Injury prevention: discussed safety/seat belts, safety helmets, smoke detectors, carbon dioxide detectors, and smoking near bedding or upholstery.  Sexual health: discussed sexually transmitted diseases, partner selection, use of condoms, avoidance of unintended pregnancy, and contraceptive alternatives.  Exercise: the importance of regular exercise/physical activity was discussed. Recommend exercise 3-5 times per week for at least 30 minutes.       Depression Screening and Follow-up Plan: Patient was screened for depression during today's encounter. They screened negative with a PHQ-9 score of 0.        History of Present Illness     Adult Annual Physical:  Patient presents for annual physical.     Diet and Physical Activity:  - Diet/Nutrition: well balanced diet.    Depression Screening:    - PHQ-9 Score: 0    Review of Systems   Constitutional: Negative.    HENT: Negative.     Eyes: Negative.    Respiratory: Negative.     Cardiovascular: Negative.    Gastrointestinal: Negative.    Endocrine: Negative.    Genitourinary: Negative.    Musculoskeletal:  Positive for arthralgias (rt shoulder).   Skin: Negative.    Allergic/Immunologic: Negative.    Neurological: Negative.    Psychiatric/Behavioral:  The patient is nervous/anxious.          Objective     /80   Pulse 98   Temp 97.5 °F (36.4 °C) (Temporal)   Ht 5' 1\" (1.549 m)   Wt 58.7 kg (129 lb 6.4 oz)   LMP 08/15/2024 (Exact Date)   SpO2 98%   BMI 24.45 kg/m²     Physical Exam  Constitutional:       General: She is not in acute distress.     Appearance: " Normal appearance. She is not ill-appearing.   HENT:      Head: Normocephalic and atraumatic.      Nose: Nose normal.      Mouth/Throat:      Mouth: Mucous membranes are moist.   Eyes:      Pupils: Pupils are equal, round, and reactive to light.   Cardiovascular:      Rate and Rhythm: Normal rate and regular rhythm.      Pulses: Normal pulses.      Heart sounds: Normal heart sounds.   Pulmonary:      Effort: Pulmonary effort is normal. No respiratory distress.      Breath sounds: Normal breath sounds.   Chest:      Chest wall: No tenderness.   Abdominal:      General: Abdomen is flat. Bowel sounds are normal. There is no distension.      Palpations: There is no mass.      Tenderness: There is no abdominal tenderness.   Musculoskeletal:         General: Normal range of motion.      Cervical back: Normal range of motion and neck supple.   Skin:     General: Skin is warm and dry.   Neurological:      General: No focal deficit present.      Mental Status: She is alert and oriented to person, place, and time.   Psychiatric:         Mood and Affect: Mood normal.         Behavior: Behavior normal.         Thought Content: Thought content normal.         Judgment: Judgment normal.

## 2024-08-21 NOTE — PATIENT INSTRUCTIONS
"Patient Education     Routine physical for adults   The Basics   Written by the doctors and editors at [facility]   What is a physical? -- A physical is a routine visit, or \"check-up,\" with your doctor. You might also hear it called a \"wellness visit\" or \"preventive visit.\"  During each visit, the doctor will:   Ask about your physical and mental health   Ask about your habits, behaviors, and lifestyle   Do an exam   Give you vaccines if needed   Talk to you about any medicines you take   Give advice about your health   Answer your questions  Getting regular check-ups is an important part of taking care of your health. It can help your doctor find and treat any problems you have. But it's also important for preventing health problems.  A routine physical is different from a \"sick visit.\" A sick visit is when you see a doctor because of a health concern or problem. Since physicals are scheduled ahead of time, you can think about what you want to ask the doctor.  How often should I get a physical? -- It depends on your age and health. In general, for people age 21 years and older:   If you are younger than 50 years, you might be able to get a physical every 3 years.   If you are 50 years or older, your doctor might recommend a physical every year.  If you have an ongoing health condition, like diabetes or high blood pressure, your doctor will probably want to see you more often.  What happens during a physical? -- In general, each visit will include:   Physical exam - The doctor or nurse will check your height, weight, heart rate, and blood pressure. They will also look at your eyes and ears. They will ask about how you are feeling and whether you have any symptoms that bother you.   Medicines - It's a good idea to bring a list of all the medicines you take to each doctor visit. Your doctor will talk to you about your medicines and answer any questions. Tell them if you are having any side effects that bother you. You " "should also tell them if you are having trouble paying for any of your medicines.   Habits and behaviors - This includes:   Your diet   Your exercise habits   Whether you smoke, drink alcohol, or use drugs   Whether you are sexually active   Whether you feel safe at home  Your doctor will talk to you about things you can do to improve your health and lower your risk of health problems. They will also offer help and support. For example, if you want to quit smoking, they can give you advice and might prescribe medicines. If you want to improve your diet or get more physical activity, they can help you with this, too.   Lab tests, if needed - The tests you get will depend on your age and situation. For example, your doctor might want to check your:   Cholesterol   Blood sugar   Iron level   Vaccines - The recommended vaccines will depend on your age, health, and what vaccines you already had. Vaccines are very important because they can prevent certain serious or deadly infections.   Discussion of screening - \"Screening\" means checking for diseases or other health problems before they cause symptoms. Your doctor can recommend screening based on your age, risk, and preferences. This might include tests to check for:   Cancer, such as breast, prostate, cervical, ovarian, colorectal, prostate, lung, or skin cancer   Sexually transmitted infections, such as chlamydia and gonorrhea   Mental health conditions like depression and anxiety  Your doctor will talk to you about the different types of screening tests. They can help you decide which screenings to have. They can also explain what the results might mean.   Answering questions - The physical is a good time to ask the doctor or nurse questions about your health. If needed, they can refer you to other doctors or specialists, too.  Adults older than 65 years often need other care, too. As you get older, your doctor will talk to you about:   How to prevent falling at " home   Hearing or vision tests   Memory testing   How to take your medicines safely   Making sure that you have the help and support you need at home  All topics are updated as new evidence becomes available and our peer review process is complete.  This topic retrieved from "[employer] on: May 02, 2024.  Topic 468096 Version 1.0  Release: 32.4.3 - C32.122  © 2024 UpToDate, Inc. and/or its affiliates. All rights reserved.  Consumer Information Use and Disclaimer   Disclaimer: This generalized information is a limited summary of diagnosis, treatment, and/or medication information. It is not meant to be comprehensive and should be used as a tool to help the user understand and/or assess potential diagnostic and treatment options. It does NOT include all information about conditions, treatments, medications, side effects, or risks that may apply to a specific patient. It is not intended to be medical advice or a substitute for the medical advice, diagnosis, or treatment of a health care provider based on the health care provider's examination and assessment of a patient's specific and unique circumstances. Patients must speak with a health care provider for complete information about their health, medical questions, and treatment options, including any risks or benefits regarding use of medications. This information does not endorse any treatments or medications as safe, effective, or approved for treating a specific patient. UpToDate, Inc. and its affiliates disclaim any warranty or liability relating to this information or the use thereof.The use of this information is governed by the Terms of Use, available at https://www.woltersVarthanauwer.com/en/know/clinical-effectiveness-terms. 2024© UpToDate, Inc. and its affiliates and/or licensors. All rights reserved.  Copyright   © 2024 UpToDate, Inc. and/or its affiliates. All rights reserved.

## 2024-08-22 PROBLEM — G89.29 CHRONIC RIGHT SHOULDER PAIN: Status: ACTIVE | Noted: 2024-08-22

## 2024-08-22 PROBLEM — M25.511 CHRONIC RIGHT SHOULDER PAIN: Status: ACTIVE | Noted: 2024-08-22

## 2024-08-22 NOTE — ASSESSMENT & PLAN NOTE
Reports ongoing problems with right shoulder, denies any acute injury.  X-ray ordered.  Referral made to physical therapy May use heat muscle rub.

## 2024-08-22 NOTE — ASSESSMENT & PLAN NOTE
Annual physical completed.  Blood work ordered.  Patient reports doing well has had some improvement with incidence of hot flashes, management of anxiety.  Discussed self-care.  Continue heart healthy diet, exercise, fluid hydration.  Mammogram ordered.  Up-to-date with cervical cancer screening and colon cancer screening

## 2024-08-22 NOTE — ASSESSMENT & PLAN NOTE
Patient has been using 37.5 mg of Effexor.  Reports less incidence of hot flashes.  Reports anxiety is better controlled.  Will keep at this dose, discussed may increase if it is not therapeutic.  Discussed long-term side effects of medication.  Patient uses nonpharmacological interventions together with medications to help with management of anxiety

## 2024-08-29 DIAGNOSIS — R23.2 HOT FLASHES: ICD-10-CM

## 2024-08-29 RX ORDER — VENLAFAXINE HYDROCHLORIDE 37.5 MG/1
CAPSULE, EXTENDED RELEASE ORAL
Qty: 90 CAPSULE | Refills: 1 | Status: SHIPPED | OUTPATIENT
Start: 2024-08-29

## 2024-09-12 ENCOUNTER — EVALUATION (OUTPATIENT)
Dept: PHYSICAL THERAPY | Facility: CLINIC | Age: 49
End: 2024-09-12
Payer: COMMERCIAL

## 2024-09-12 DIAGNOSIS — M75.41 IMPINGEMENT SYNDROME OF RIGHT SHOULDER: Primary | ICD-10-CM

## 2024-09-12 DIAGNOSIS — M54.2 NECK PAIN: ICD-10-CM

## 2024-09-12 PROCEDURE — 97161 PT EVAL LOW COMPLEX 20 MIN: CPT | Performed by: PHYSICAL THERAPIST

## 2024-09-12 PROCEDURE — 97110 THERAPEUTIC EXERCISES: CPT | Performed by: PHYSICAL THERAPIST

## 2024-09-12 PROCEDURE — 97140 MANUAL THERAPY 1/> REGIONS: CPT | Performed by: PHYSICAL THERAPIST

## 2024-09-12 NOTE — PROGRESS NOTES
PT Evaluation     Today's date: 2024  Patient name: Ann Toney  : 1975  MRN: [id number]  Referring provider: Abbi Crump CRNP  Dx:   Encounter Diagnosis     ICD-10-CM    1. Impingement syndrome of right shoulder  M75.41       2. Neck pain  M54.2           Start Time: 0845  Stop Time: 0930  Total time in clinic (min): 45 minutes    Assessment  Impairments: abnormal muscle tone, abnormal or restricted ROM, activity intolerance, impaired physical strength, lacks appropriate home exercise program, pain with function, poor posture  and poor body mechanics    Assessment details: Patient is a 48 y.o. female who presents to physical therapy with c/o chronic neck and right shoulder pain consistent with subacromial pain syndrome. Patient presents to evaluation with pain, decreased range of motion, decreased strength, and decreased tolerance to activity. Patient demonstrates good tolerance to treatment and was provided with a written copy of their initial home exercise program focusing on postural re-education and shoulder/t-spine ROM - pt was encouraged to perform daily per tolerance. I discussed risks, benefits, and alternatives to treatment, and answered all patient questions to patient satisfaction. Patient presents with baseline FOTO score of 52 indicating limited tolerance/ability to complete ADLs. Patient is an appropriate candidate for skilled PT and would benefit from skilled PT services to address the aforementioned impairments, achieve goals, maximize function, and improve quality of life. Pt is in agreement with this plan.    Patient Education: activity modifications as needed, pacing of activities, importance of HEP compliance, PT prognosis/POC    Barriers to therapy: Chronicity of sx    Goals  ST weeks  Pt will demonstrate good understanding and compliance with HEP  Pt will decrease pain to 3-4/10 with activity     Pt will demonstrate improved postural awareness and ability to  self-correct without reliance on external cues  Pt will demonstrate centralization of symptoms with repeated movements and/or traction of cervical spine    LT weeks  Pt will improve FOTO score to > or = to 66 to indicate improved functional abilities   Pt will decrease pain to 0-1/10 with activity  Pt will increase shoulder strength to 5-/5 for improved tolerance/independence with ADLs  Pt will increase PROM to WNL to allow for return of AROM of affected shoulder  Pt will increase shoulder flexion/abduction to 170 degrees to increase independence with ADLs   Pt will increase functional IR to be able to don/doff bra and wash back without pain   Pt will increase functional ER to be able to wash hair independently without pain  Pt will demonstrate abolished radicular symptoms for 2 weeks  Pt will be able to tolerate prolonged standing/sitting activities > 30 minutes without provocation of neck pain             Plan  Patient would benefit from: PT eval and skilled physical therapy  Planned modality interventions: cryotherapy, thermotherapy: hydrocollator packs, TENS and unattended electrical stimulation    Planned therapy interventions: ADL training, activity modification, joint mobilization, manual therapy, body mechanics training, neuromuscular re-education, patient/caregiver education, postural training, self care, strengthening, stretching, therapeutic activities, therapeutic exercise, home exercise program, graded exercise, graded activity, functional ROM exercises and flexibility    Frequency: 2x week  Duration in weeks: 8  Plan of Care beginning date: 2024  Plan of Care expiration date: 2024  Treatment plan discussed with: patient        Subjective Evaluation    History of Present Illness  Mechanism of injury: Pt reports to PT with c/o right shoulder pain that started 15 months ago while lifting a heavy table and noticed onset of right shoulder pain that has episodically bothered her since and now  more constant and impacting daily activities. Pt denies N/T into extremities. Pt does admit hx of chronic neck pain and migraines, states she has recently started working with massage therapist a few months ago for her neck and feels it is helping. Pt denies hx of shoulder issues prior to current episode. Pt had recent x-rays which were negative. Pt is right hand dominant. Pt has increased pain/difficulty with raising arm above shoulder level, rotation of arm, reaching out to the side, heavy lifting/carrying, sleeping, reaching behind back, reaching behind her head to wash hair. Pt is using Advil, ice, and heat as needed for pain relief    Aggravating factors: raising arm above shoulder level, rotation of arm, reaching out to the side, heavy lifting/carrying, sleeping, reaching behind back, reaching behind her head to wash hair    Easing Factors: Advil, ice, and heat    PLOF:  Hx of pain for 15 months, no hx of similar shoulder pain prior to this    PMH: Chronic neck pain and migraines   Patient Goals  Patient goals for therapy: increased motion, decreased pain, increased strength, independence with ADLs/IADLs and return to sport/leisure activities    Pain  Current pain ratin  At best pain rating: 3  At worst pain ratin  Quality: burning, dull ache, tight, knife-like and sharp    Hand dominance: right      Diagnostic Tests  X-ray: normal  Treatments  Current treatment: massage        Objective     General Comments:      Shoulder Comments   Posture: FHP, RS, increased thoracic kyphosis    Cervical Screen: (+) Spurlings, (+) SOR, cervical AROM WNL     Right shoulder AROM: Flex 170* (pain) Abd 70* (pain) Functional IR to L4 (pain) Functional ER to T4 (pain)    Right shoulder PROM: Flex 175* (pain) Abd 160* (pain) IR 70* (pain) ER 60* (pain)    Right shoulder MMT: Flex 5-/5 Abd 4/5 (pain) IR 4+/5 (pain) ER 5-/5    Light touch intact and symmetrical bilateral UE    Mahsa-Teto:(+)  Empty can: (+)  Frederick's  "Compression: (-)  Modified Dynamic Labral Shear: (-)  Sulcus sign: (-)  Hor Adduction: (-)  Apprehension/relocation: (-)  Speeds: (-)    FOTO: 52               Diagnosis: Chronic neck pain and right shoulder subacromial pain syndrome    Precautions: Chronic neck pain/migraines    POC Expires: 11/7/24   Re-evaluation Date: 10/10/24    FOTO Scores/Date: Goal - 66; 9/12 - 52   Visit Count 1/10       Manuals 9/12       Right shoulder PROM all planes per tolerance KD       Cervical traction KD       SOR KD       Seated mid thoracic Gr V KD               Ther Ex 9/12       Supine thoracic ext 10x3\"        Wall wash 10x3\" ea (flex/scap)       IR belt stretch  NV                                       Pt education KD       HEP Creation/instruction       Neuro Re-Ed 9/12       UBE (retro) for postural re-education/strength NV       Prone scapular retraction/depression NV       Prone scapular retraction/depression with lift off NV                                      Ther Act                                                                                 Modalities                                            "

## 2024-09-17 ENCOUNTER — OFFICE VISIT (OUTPATIENT)
Dept: PHYSICAL THERAPY | Facility: CLINIC | Age: 49
End: 2024-09-17
Payer: COMMERCIAL

## 2024-09-17 DIAGNOSIS — M75.41 IMPINGEMENT SYNDROME OF RIGHT SHOULDER: Primary | ICD-10-CM

## 2024-09-17 DIAGNOSIS — M54.2 NECK PAIN: ICD-10-CM

## 2024-09-17 PROCEDURE — 97112 NEUROMUSCULAR REEDUCATION: CPT | Performed by: PHYSICAL THERAPIST

## 2024-09-17 PROCEDURE — 97110 THERAPEUTIC EXERCISES: CPT | Performed by: PHYSICAL THERAPIST

## 2024-09-17 PROCEDURE — 97140 MANUAL THERAPY 1/> REGIONS: CPT | Performed by: PHYSICAL THERAPIST

## 2024-09-17 NOTE — PROGRESS NOTES
Daily Note     Today's date: 2024  Patient name: Ann Toney  : 1975  MRN: [id number]  Referring provider: Abbi Crump CRNP  Dx:   Encounter Diagnosis     ICD-10-CM    1. Impingement syndrome of right shoulder  M75.41       2. Neck pain  M54.2           Start Time: 0930  Stop Time: 1015  Total time in clinic (min): 45 minutes    Subjective: Pt reports improved shoulder motion and pain overall, notes good HEP compliance and feels stretches are helping. Pt rates current pain 3-4/10.       Objective: See treatment diary below      Assessment: Pt demonstrates significant improvement in PROM all planes which is now essentially full all planes without end range pain. Progressed wall wash to include active lift off component in both flexion and scaption with good tolerance and no pain, initial cues to avoid elbow flexion/trunk lean compensations to isolate at GHJ. Initiated prone scapular re-education with cues for proper scapular mechanics and avoidance of UT activation with good tolerance, notes very minor anterior shoulder discomfort after completion of lift off component but doesn't linger. Introduced IR belt stretch with good mobility and minimal pain, only end range stretching/stiffness - added IR stick pass with more discomfort that stays constant throughout set of 10 and doesn't dissipate with repetition. HEP was updated and reviewed, will progress next visit as able.       Plan: Continue per plan of care.  Progress treatment as tolerated.       Diagnosis: Chronic neck pain and right shoulder subacromial pain syndrome    Precautions: Chronic neck pain/migraines    POC Expires: 24   Re-evaluation Date: 10/10/24    FOTO Scores/Date: Goal - 66;  -    Visit Count 1/10 2/10      Manuals       Right shoulder PROM all planes per tolerance KD KD      Cervical traction KD KD      SOR KD KD      Seated mid thoracic Gr V KD KD              Ther Ex       Supine thoracic ext  "10x3\"  10x3\"      Wall wash 10x3\" ea (flex/scap) 10x3\" ea w/ lift off      IR belt stretch  NV 10x10\"      IR stick pass  10x                              Pt education KD KD      HEP Creation/instruction Updated       Neuro Re-Ed 9/12 9/17      UBE (retro) for postural re-education/strength NV L1 x 5 min      Prone scapular retraction/depression NV 10x10\"       Prone scapular retraction/depression with lift off NV 10x10\"       Robbery  Blue 10x5\"                              Ther Act                                                                                 Modalities                                            "

## 2024-09-19 ENCOUNTER — OFFICE VISIT (OUTPATIENT)
Dept: PHYSICAL THERAPY | Facility: CLINIC | Age: 49
End: 2024-09-19
Payer: COMMERCIAL

## 2024-09-19 DIAGNOSIS — M54.2 NECK PAIN: ICD-10-CM

## 2024-09-19 DIAGNOSIS — M75.41 IMPINGEMENT SYNDROME OF RIGHT SHOULDER: Primary | ICD-10-CM

## 2024-09-19 PROCEDURE — 97140 MANUAL THERAPY 1/> REGIONS: CPT | Performed by: PHYSICAL THERAPIST

## 2024-09-19 PROCEDURE — 97112 NEUROMUSCULAR REEDUCATION: CPT | Performed by: PHYSICAL THERAPIST

## 2024-09-19 PROCEDURE — 97110 THERAPEUTIC EXERCISES: CPT | Performed by: PHYSICAL THERAPIST

## 2024-09-19 NOTE — PROGRESS NOTES
Daily Note     Today's date: 2024  Patient name: Ann Toney  : 1975  MRN: [id number]  Referring provider: Abbi Crump CRNP  Dx:   Encounter Diagnosis     ICD-10-CM    1. Impingement syndrome of right shoulder  M75.41       2. Neck pain  M54.2           Start Time: 0930  Stop Time: 1015  Total time in clinic (min): 45 minutes    Subjective: Pt reports feeling good after last visit but noticed significant neck pain yesterday that has since calmed down and back to baseline. Pt overall notes significant improvement in postural awareness and shoulder mobility.       Objective: See treatment diary below      Assessment: Pt reports increased neck pain day following therapy but unsure if this was caused by exercises, feels that sub-occipital release feels good but has been more tender in that area and requests to hold SOR today to see how symptoms respond after session today. Denies this reaction with SOR following IE. Held SOR but good tolerance noted with all other manual interventions and maintains essentially full PROM of the shoulder with only slight discomfort noted with end range IR. Much improved scapular mechanics noted with prone exercises and less reliant on cues for form. Pt demonstrates significant improvement in functional IR mobility today with less discomfort compared to last session. Pt reports pain decreased to 0/10 post tx. HEP was updated and reviewed, will progress next visit as able.       Plan: Continue per plan of care.  Progress treatment as tolerated.       Diagnosis: Chronic neck pain and right shoulder subacromial pain syndrome    Precautions: Chronic neck pain/migraines    POC Expires: 24   Re-evaluation Date: 10/10/24    FOTO Scores/Date: Goal - 66;  -    Visit Count 1/10 2/10 310     Manuals      Right shoulder PROM all planes per tolerance KD KD KD     Cervical traction KD KD KD     SOR KD KD NT     Seated mid thoracic Gr V KD KD KD            "  Ther Ex 9/12 9/17 9/19     Supine thoracic ext 10x3\"  10x3\" 10x3\"      Wall wash 10x3\" ea (flex/scap) 10x3\" ea w/ lift off 10x3\" ea w/ lift off     IR belt stretch  NV 10x10\" 10x10\"      IR stick pass  10x 10x                             Pt education KD KD KD     HEP Creation/instruction Updated  Updated      Neuro Re-Ed 9/12 9/17 9/19     UBE (retro) for postural re-education/strength NV L1 x 5 min L1 x 5 min     Prone scapular retraction/depression NV 10x10\"  10x10\"      Prone scapular retraction/depression with lift off NV 10x10\"  10x10\"      Robbery  Blue 10x5\"  Blue 10x5\"                             Ther Act                                                                                 Modalities                                              "

## 2024-09-22 DIAGNOSIS — M62.838 MUSCLE SPASM: ICD-10-CM

## 2024-09-24 ENCOUNTER — OFFICE VISIT (OUTPATIENT)
Dept: PHYSICAL THERAPY | Facility: CLINIC | Age: 49
End: 2024-09-24
Payer: COMMERCIAL

## 2024-09-24 DIAGNOSIS — M75.41 IMPINGEMENT SYNDROME OF RIGHT SHOULDER: Primary | ICD-10-CM

## 2024-09-24 DIAGNOSIS — N95.1 MENOPAUSAL SYMPTOMS: Primary | ICD-10-CM

## 2024-09-24 DIAGNOSIS — M54.2 NECK PAIN: ICD-10-CM

## 2024-09-24 PROCEDURE — 97140 MANUAL THERAPY 1/> REGIONS: CPT | Performed by: PHYSICAL THERAPIST

## 2024-09-24 PROCEDURE — 97110 THERAPEUTIC EXERCISES: CPT | Performed by: PHYSICAL THERAPIST

## 2024-09-24 PROCEDURE — 97112 NEUROMUSCULAR REEDUCATION: CPT | Performed by: PHYSICAL THERAPIST

## 2024-09-24 RX ORDER — METHOCARBAMOL 500 MG/1
500 TABLET, FILM COATED ORAL 2 TIMES DAILY PRN
Qty: 30 TABLET | Refills: 0 | Status: SHIPPED | OUTPATIENT
Start: 2024-09-24

## 2024-09-24 NOTE — PROGRESS NOTES
"Daily Note     Today's date: 2024  Patient name: Ann Toney  : 1975  MRN: [id number]  Referring provider: Abbi Crump CRNP  Dx:   Encounter Diagnosis     ICD-10-CM    1. Impingement syndrome of right shoulder  M75.41       2. Neck pain  M54.2           Start Time: 0930  Stop Time: 1015  Total time in clinic (min): 45 minutes    Subjective: Pt reports continued improvements in both neck and shoulder pain/function. Pt reports significant improvement in ability to reach behind her back with less pain.       Objective: See treatment diary below      Assessment: Pt continues to progress excellently with shoulder mobility all planes but significant improvement in quality/quantity of motion with functional IR and able to complete with minimal discomfort today. Overall good end range motion noted with active lift offs at end range flexion/abd and able to introduce 4-way OH steamboats with work OH strength/stability with good tolerance but initial cues to avoid compensatory elbow flexion or trunk lean to isolate at GHJ. Progressed scapular strengthening with push up plus and rows/pulldowns with notable fatigue but slight shoulder discomfort noted with final 1-2 reps of each set but pain doesn't linger after completion. HEP was updated and reviewed and encouraged to continue with daily as able.       Plan: Continue per plan of care.  Progress treatment as tolerated.       Diagnosis: Chronic neck pain and right shoulder subacromial pain syndrome    Precautions: Chronic neck pain/migraines    POC Expires: 24   Re-evaluation Date: 10/10/24    FOTO Scores/Date: Goal - 66;  -    Visit Count 10 2/10 3/10 4/10    Manuals     Right shoulder PROM all planes per tolerance KD KD KD KD    Cervical traction KD KD KD KD    SOR KD KD NT NT    Seated mid thoracic Gr V KD KD KD KD            Ther Ex     Supine thoracic ext 10x3\"  10x3\" 10x3\"  10x3\"     Wall wash 10x3\" " "ea (flex/scap) 10x3\" ea w/ lift off 10x3\" ea w/ lift off 10x3\" ea w/ lift off    IR belt stretch  NV 10x10\" 10x10\"  10x10\"     IR stick pass  10x 10x 10x                            Pt education KD KD KD KD    HEP Creation/instruction Updated  Updated  Updated     Neuro Re-Ed 9/12 9/17 9/19 9/24    UBE (retro) for postural re-education/strength NV L1 x 5 min L1 x 5 min L1 x 5 min    Prone scapular retraction/depression NV 10x10\"  10x10\"  DC    Prone scapular retraction/depression with lift off NV 10x10\"  10x10\"  10x10\" 1#    Robbery  Blue 10x5\"  Blue 10x5\"  Blue 10x5\"    Rows/pulldowns Thomas     18/14# 2x10     4-way [place] steamboat    Red 20x ea    Push up plus    10x at plinth                   Ther Act                                                                                 Modalities                                                "

## 2024-09-26 ENCOUNTER — OFFICE VISIT (OUTPATIENT)
Dept: PHYSICAL THERAPY | Facility: CLINIC | Age: 49
End: 2024-09-26
Payer: COMMERCIAL

## 2024-09-26 DIAGNOSIS — M54.2 NECK PAIN: ICD-10-CM

## 2024-09-26 DIAGNOSIS — M75.41 IMPINGEMENT SYNDROME OF RIGHT SHOULDER: Primary | ICD-10-CM

## 2024-09-26 PROCEDURE — 97112 NEUROMUSCULAR REEDUCATION: CPT | Performed by: PHYSICAL THERAPIST

## 2024-09-26 PROCEDURE — 97110 THERAPEUTIC EXERCISES: CPT | Performed by: PHYSICAL THERAPIST

## 2024-09-26 PROCEDURE — 97140 MANUAL THERAPY 1/> REGIONS: CPT | Performed by: PHYSICAL THERAPIST

## 2024-09-26 NOTE — PROGRESS NOTES
"Daily Note     Today's date: 2024  Patient name: Ann Toney  : 1975  MRN: [id number]  Referring provider: Abbi Crump CRNP  Dx:   Encounter Diagnosis     ICD-10-CM    1. Impingement syndrome of right shoulder  M75.41       2. Neck pain  M54.2           Start Time: 09  Stop Time: 1018  Total time in clinic (min): 44 minutes    Subjective: Pt reports overall improved symptoms with notable muscle soreness after last visit. Pt reports onset of migraine and increased right sided neck tightness/tension yesterday and is bothering her today, rates current pain 5/10.      Objective: See treatment diary below      Assessment: Pt arrives with increased baseline pain and started with manual tx with significant reduction in pain with SOR and STM/stretching with ability to reduce symptoms to 0/10. Modified tx session due to increased symptoms to hold strengthening exercises but demonstrates good tolerance with ROM exercises and able to complete without provocation of pain symptoms. Updated HEP to include LS/UT stretching and encouraged to perform daily as symptoms allow. Will progress as able next visit.       Plan: Continue per plan of care.  Progress treatment as tolerated.       Diagnosis: Chronic neck pain and right shoulder subacromial pain syndrome    Precautions: Chronic neck pain/migraines    POC Expires: 24   Re-evaluation Date: 10/10/24    FOTO Scores/Date: Goal - 66;  -    Visit Count 1/10 2/10 3/10 4/10 5/10   Manuals    Right shoulder PROM all planes per tolerance KD KD KD KD KD   Cervical traction KD KD KD KD KD   SOR KD KD NT NT KD   Seated mid thoracic Gr V KD KD KD KD KD   Right UT STM/stretch; LS stretch     KD   Ther Ex    Supine thoracic ext 10x3\"  10x3\" 10x3\"  10x3\"  10x3\"    Wall wash 10x3\" ea (flex/scap) 10x3\" ea w/ lift off 10x3\" ea w/ lift off 10x3\" ea w/ lift off 10x3\" ea w/ lift off   IR belt stretch  NV 10x10\" 10x10\"  " "10x10\"  10x10\"    IR stick pass  10x 10x 10x                            Pt education KD KD KD KD    HEP Creation/instruction Updated  Updated  Updated     Neuro Re-Ed 9/12 9/17 9/19 9/24 9/26   UBE (retro) for postural re-education/strength NV L1 x 5 min L1 x 5 min L1 x 5 min L1 x 5 min   Prone scapular retraction/depression NV 10x10\"  10x10\"  DC DC   Prone scapular retraction/depression with lift off NV 10x10\"  10x10\"  10x10\" 1# 10x10\"    Robbery  Blue 10x5\"  Blue 10x5\"  Blue 10x5\" Blue 10x5\"    Rows/pulldowns Thomas     18/14# 2x10     4-way [place] ste[place]oat    Red 20x ea NT   Push up plus    10x at plint                   Ther Act                                                                                 Modalities                                                  "

## 2024-10-01 ENCOUNTER — OFFICE VISIT (OUTPATIENT)
Dept: PHYSICAL THERAPY | Facility: CLINIC | Age: 49
End: 2024-10-01
Payer: COMMERCIAL

## 2024-10-01 DIAGNOSIS — M54.2 NECK PAIN: ICD-10-CM

## 2024-10-01 DIAGNOSIS — M75.41 IMPINGEMENT SYNDROME OF RIGHT SHOULDER: Primary | ICD-10-CM

## 2024-10-01 PROCEDURE — 97110 THERAPEUTIC EXERCISES: CPT | Performed by: PHYSICAL THERAPIST

## 2024-10-01 PROCEDURE — 97112 NEUROMUSCULAR REEDUCATION: CPT | Performed by: PHYSICAL THERAPIST

## 2024-10-01 PROCEDURE — 97140 MANUAL THERAPY 1/> REGIONS: CPT | Performed by: PHYSICAL THERAPIST

## 2024-10-01 NOTE — PROGRESS NOTES
"Daily Note     Today's date: 10/1/2024  Patient name: Ann Toney  : 1975  MRN: [id number]  Referring provider: Abbi Crump CRNP  Dx:   Encounter Diagnosis     ICD-10-CM    1. Impingement syndrome of right shoulder  M75.41       2. Neck pain  M54.2           Start Time: 0930  Stop Time: 1020  Total time in clinic (min): 50 minutes    Subjective: Pt reports significant improvement in pain after last visit, notes pain has been much better since. Denies any current shoulder/neck pain.      Objective: See treatment diary below      Assessment: Pt continues to respond excellently to manual interventions with good improvement in cervical mobility/stiffness after completion, maintains essentially full right shoulder PROM all planes with slight limitation with end range IR. Pt able to resume all exercises today with good tolerance, as well as progress scapular strengthening per chart without provocation of pain. Pt did note slight transient shoulder discomfort with 4-way steamboats but able to modify ROM as needed to abolish pain. Cues issued for proper form/sequencing with push up plus but becomes independent with form after initial cues. Pt denies any shoulder/neck pain post tx, only muscle fatigue. Will continue to progress next visit as symptoms allow.      Plan: Continue per plan of care.  Progress treatment as tolerated.       Diagnosis: Chronic neck pain and right shoulder subacromial pain syndrome    Precautions: Chronic neck pain/migraines    POC Expires: 24   Re-evaluation Date: 10/10/24    FOTO Scores/Date: Goal - 66;  -    Visit Count 6/10 2/10 3/10 4/10 5/10   Manuals 10/1 9/17 9/19 9/24 9/26   Right shoulder PROM all planes per tolerance KD KD KD KD KD   Cervical traction KD KD KD KD KD   SOR KD KD NT NT KD   Seated mid thoracic Gr V KD KD KD KD KD   Right UT STM/stretch; LS stretch KD    KD   Ther Ex 10/1 9/17 9/19 9/24 9/26   Supine thoracic ext 10x3\"  10x3\" 10x3\"  10x3\"  10x3\"  " "  Wall wash 10x3\" ea (flex/scap) 10x3\" ea w/ lift off 10x3\" ea w/ lift off 10x3\" ea w/ lift off 10x3\" ea w/ lift off   IR belt stretch  10x10\" 10x10\" 10x10\"  10x10\"  10x10\"    IR stick pass 10x 10x 10x 10x    Vertical ER slides Red 10x       Lateral ER wall walk Red 3 laps                               Pt education KD KD KD KD    HEP Updated  Updated  Updated  Updated     Neuro Re-Ed 10/1 9/17 9/19 9/24 9/26   UBE (retro) for postural re-education/strength L1 x 5 min L1 x 5 min L1 x 5 min L1 x 5 min L1 x 5 min   Prone scapular retraction/depression DC 10x10\"  10x10\"  DC DC   Prone scapular retraction/depression with lift off 10x10\" 1# 10x10\"  10x10\"  10x10\" 1# 10x10\"    Robbery Blue 10x5\"  Blue 10x5\"  Blue 10x5\"  Blue 10x5\" Blue 10x5\"    Rows/pulldowns [place]  18/14# 2x10    18/14# 2x10     4-way [place] ste[place]oat Red 20x ea   Red 20x ea NT   Push up plus 10x at plinth   10x at plinth                   Ther Act                                                                                 Modalities                                                    "

## 2024-10-03 ENCOUNTER — OFFICE VISIT (OUTPATIENT)
Dept: PHYSICAL THERAPY | Facility: CLINIC | Age: 49
End: 2024-10-03
Payer: COMMERCIAL

## 2024-10-03 DIAGNOSIS — M54.2 NECK PAIN: ICD-10-CM

## 2024-10-03 DIAGNOSIS — M75.41 IMPINGEMENT SYNDROME OF RIGHT SHOULDER: Primary | ICD-10-CM

## 2024-10-03 PROCEDURE — 97110 THERAPEUTIC EXERCISES: CPT | Performed by: PHYSICAL THERAPIST

## 2024-10-03 PROCEDURE — 97112 NEUROMUSCULAR REEDUCATION: CPT | Performed by: PHYSICAL THERAPIST

## 2024-10-03 PROCEDURE — 97140 MANUAL THERAPY 1/> REGIONS: CPT | Performed by: PHYSICAL THERAPIST

## 2024-10-03 NOTE — PROGRESS NOTES
"Daily Note     Today's date: 10/3/2024  Patient name: Ann Toney  : 1975  MRN: [id number]  Referring provider: Abbi Crump CRNP  Dx:   Encounter Diagnosis     ICD-10-CM    1. Impingement syndrome of right shoulder  M75.41       2. Neck pain  M54.2           Start Time: 0930  Stop Time: 1022  Total time in clinic (min): 52 minutes    Subjective: Pt reports muscle soreness but no increase in pain after last visit, overall feels she is progressing well in therapy.      Objective: See treatment diary below      Assessment: Pt continues to respond well to manual interventions with good reduction in neck pain, excellent improvement in shoulder PROM all planes which is now full and pain-free all planes. Significant improvement in movement quality with active wall wash with lift offs and pain-free, as well as improved motion with OH steamboats, however requires modified tension with extension. Progressed depth with push up plus to low mat table with much more difficulty but maintains good push up depth and no pain, only fatigue. Cues issued throughout session for correction of exercise form and eccentric control. Pt denies increase in pain post tx, only significant muscle fatigue. HEP was updated and reviewed, will progress next visit as able.       Plan: Continue per plan of care.  Progress treatment as tolerated.         Diagnosis: Chronic neck pain and right shoulder subacromial pain syndrome    Precautions: Chronic neck pain/migraines    POC Expires: 24   Re-evaluation Date: 10/10/24    FOTO Scores/Date: Goal - 66;  -    Visit Count 6/10 7/10 3/10 4/10 5/10   Manuals 10/1 10/3 9/19 9/24 9/26   Right shoulder PROM all planes per tolerance KD KD KD KD KD   Cervical traction KD KD KD KD KD   SOR KD KD NT NT KD   Seated mid thoracic Gr V KD KD KD KD KD   Right UT STM/stretch; LS stretch KD KD   KD   Ther Ex 10/1 10/3 9/19 9/24 9/26   Supine thoracic ext 10x3\"  10x3\" 10x3\"  10x3\"  10x3\"    Wall " "wash 10x3\" ea (flex/scap) 10x3\" ea w/ lift off 10x3\" ea w/ lift off 10x3\" ea w/ lift off 10x3\" ea w/ lift off   IR belt stretch  10x10\" 10x10\" 10x10\"  10x10\"  10x10\"    IR stick pass 10x 10x 10x 10x    Vertical ER slides Red 10x Red 10x      Lateral ER wall walk Red 3 laps Red 3 laps      OH press into ER + OH press  Red 10x      ER walk outs  6# 4 laps                      Pt education KD KD KD KD    HEP Updated  Updated  Updated  Updated     Neuro Re-Ed 10/1 10/3 9/19 9/24 9/26   UBE (retro) for postural re-education/strength L1 x 5 min L1 x 5 min L1 x 5 min L1 x 5 min L1 x 5 min   Prone scapular retraction/depression DC DC 10x10\"  DC DC   Prone scapular retraction/depression with lift off 10x10\" 1# 10x10\" 2# 10x10\"  10x10\" 1# 10x10\"    Robbery Blue 10x5\"  Blue 10x5\"  Blue 10x5\"  Blue 10x5\" Blue 10x5\"    Rows/pulldowns Thomas  18/14# 2x10  18/15# 2x10  18/14# 2x10     4-way [place] ste[place] Red 20x ea Red 20x ea  Red 20x ea NT   Push up plus 10x at plinth 10x ea low mat   10x at plinth                                   Ther Act                                                                                 Modalities                                                      "

## 2024-10-08 ENCOUNTER — OFFICE VISIT (OUTPATIENT)
Dept: PHYSICAL THERAPY | Facility: CLINIC | Age: 49
End: 2024-10-08
Payer: COMMERCIAL

## 2024-10-08 DIAGNOSIS — M54.2 NECK PAIN: ICD-10-CM

## 2024-10-08 DIAGNOSIS — M75.41 IMPINGEMENT SYNDROME OF RIGHT SHOULDER: Primary | ICD-10-CM

## 2024-10-08 PROCEDURE — 97140 MANUAL THERAPY 1/> REGIONS: CPT | Performed by: PHYSICAL THERAPIST

## 2024-10-08 PROCEDURE — 97110 THERAPEUTIC EXERCISES: CPT | Performed by: PHYSICAL THERAPIST

## 2024-10-08 PROCEDURE — 97112 NEUROMUSCULAR REEDUCATION: CPT | Performed by: PHYSICAL THERAPIST

## 2024-10-08 NOTE — PROGRESS NOTES
"Daily Note     Today's date: 10/8/2024  Patient name: Ann Toney  : 1975  MRN: [id number]  Referring provider: Abbi Crump CRNP  Dx:   Encounter Diagnosis     ICD-10-CM    1. Impingement syndrome of right shoulder  M75.41       2. Neck pain  M54.2           Start Time: 0845  Stop Time: 09  Total time in clinic (min): 50 minutes    Subjective: Pt denies any current pain today, notes slight discomfort with reaching behind her back but significant less pain than before and now has full motion.       Objective: See treatment diary below      Assessment: Pt continues to respond excellently with manual tx today and much improved tissue quality of right UT, continues to note good relief in muscular tension/tightness following cervical traction/SOR/STM. Pt was able to complete all exercises today with good tolerance but notable fatigue throughout. Pt was issued cues throughout session for correction of exercise form/sequencing. Pt remains with slight discomfort with shoulder extension during 4-way OH steamboats but abolishes with decreasing TB tension. Pt denies increase in pain post tx, only muscle fatigue. Will continue to progress next visit as symptoms allow.      Plan: Continue per plan of care.  Progress treatment as tolerated.       Diagnosis: Chronic neck pain and right shoulder subacromial pain syndrome    Precautions: Chronic neck pain/migraines    POC Expires: 24   Re-evaluation Date: 10/10/24    FOTO Scores/Date: Goal - 66;    Visit Count 6/10 7/10 3/10 4/10 5/10   Manuals 10/1 10/3 10/8 9/24 9/26   Right shoulder PROM all planes per tolerance KD KD KD KD KD   Cervical traction KD KD KD KD KD   SOR KD KD KD NT KD   Seated mid thoracic Gr V KD KD KD KD KD   Right UT STM/stretch; LS stretch KD KD KD  KD   Ther Ex 10/1 10/3 10/8 9/24 9/26   Supine thoracic ext 10x3\"  10x3\" 10x3\"  10x3\"  10x3\"    Wall wash 10x3\" ea (flex/scap) 10x3\" ea w/ lift off 10x3\" ea w/ lift off 10x3\" ea w/ " "lift off 10x3\" ea w/ lift off   IR belt stretch  10x10\" 10x10\" 10x10\"  10x10\"  10x10\"    IR stick pass 10x 10x 10x 10x    Vertical ER slides Red 10x Red 10x Red 10x     Lateral ER wall walk Red 3 laps Red 3 laps Red 3 laps     OH press into ER + OH press  Red 10x Red 10x     ER walk outs  6# 4 laps 6# 4 laps                     Pt education KD KD KD KD    HEP Updated  Updated  Updated  Updated     Neuro Re-Ed 10/1 10/3 10/8 9/24 9/26   UBE (retro) for postural re-education/strength L1 x 5 min L1 x 5 min L1 x 5 min L1 x 5 min L1 x 5 min   Prone scapular retraction/depression DC DC DC DC DC   Prone scapular retraction/depression with lift off 10x10\" 1# 10x10\" 2# 10x10\" 2# 10x10\" 1# 10x10\"    Robbery Blue 10x5\"  Blue 10x5\"  Blue 10x5\"  Blue 10x5\" Blue 10x5\"    Rows/pulldowns [place]  18/14# 2x10  18/15# 2x10 20/15# 2x10 18/14# 2x10     4-way [place] steamboat Red 20x ea Red 20x ea Red 20x ea Red 20x ea NT   Push up plus 10x at plinth 10x ea low mat  10x at low mat 10x at plinth                                   Ther Act                                                                                 Modalities                                                        "

## 2024-10-10 ENCOUNTER — OFFICE VISIT (OUTPATIENT)
Dept: PHYSICAL THERAPY | Facility: CLINIC | Age: 49
End: 2024-10-10
Payer: COMMERCIAL

## 2024-10-10 DIAGNOSIS — M75.41 IMPINGEMENT SYNDROME OF RIGHT SHOULDER: Primary | ICD-10-CM

## 2024-10-10 DIAGNOSIS — M54.2 NECK PAIN: ICD-10-CM

## 2024-10-10 PROCEDURE — 97112 NEUROMUSCULAR REEDUCATION: CPT | Performed by: PHYSICAL THERAPIST

## 2024-10-10 PROCEDURE — 97110 THERAPEUTIC EXERCISES: CPT | Performed by: PHYSICAL THERAPIST

## 2024-10-10 PROCEDURE — 97140 MANUAL THERAPY 1/> REGIONS: CPT | Performed by: PHYSICAL THERAPIST

## 2024-10-10 NOTE — PROGRESS NOTES
Daily Note     Today's date: 10/10/2024  Patient name: Ann Toney  : 1975  MRN: [id number]  Referring provider: Abbi Crump CRNP  Dx:   Encounter Diagnosis     ICD-10-CM    1. Impingement syndrome of right shoulder  M75.41       2. Neck pain  M54.2           Start Time: 1545  Stop Time: 1645  Total time in clinic (min): 60 minutes    Subjective: Pt reports current pain is slightly more aggravated with certain movements today that she noticed while doing her step class at home and was 4-5/10.       Objective: See treatment diary below      Assessment: Pt continues to respond excellently to manual interventions, brings in her  to be taught how to perform cervical traction and SOR as needed to help with while away from therapy as needed to modulate pain symptoms given hx of chronic neck pain/migraines and significant symptom relief with manual interventions. Despite increased baseline pain symptoms, pt demonstrates abolished pain following manual tx and was able to complete all exercises today without provocation of pain symptoms. Pt remains easily fatigued with current exercise program with continued reliance on cues for proper exercise form/control. Pt reports maintained improvements in pain of 0/10 post tx. HEP was updated and reviewed, will progress next visit as able.       Plan: Continue per plan of care.  Progress treatment as tolerated.       Diagnosis: Chronic neck pain and right shoulder subacromial pain syndrome    Precautions: Chronic neck pain/migraines    POC Expires: 24   Re-evaluation Date: 10/10/24    FOTO Scores/Date: Goal - 66;  -    Visit Count 6/10 7/10 3/10 4/10 5/10   Manuals 10/1 10/3 10/8 10/10 9/26   Right shoulder PROM all planes per tolerance KD KD KD KD KD   Cervical traction KD KD KD KD - instructed  on how to perform KD   SOR KD KD KD KD - instructed  on how to perform  KD   Seated mid thoracic Gr V KD KD KD KD KD   Right UT STM/stretch;  "LS stretch KD KD KD KD KD   Ther Ex 10/1 10/3 10/8 10/10 9/26   Supine thoracic ext 10x3\"  10x3\" 10x3\"  10x3\"  10x3\"    Wall wash 10x3\" ea (flex/scap) 10x3\" ea w/ lift off 10x3\" ea w/ lift off 10x3\" ea w/ lift off 10x3\" ea w/ lift off   IR belt stretch  10x10\" 10x10\" 10x10\"  10x10\"  10x10\"    IR stick pass 10x 10x 10x 10x    Vertical ER slides Red 10x Red 10x Red 10x Red 10x    Lateral ER wall walk Red 3 laps Red 3 laps Red 3 laps Red 3 laps    OH press into ER + OH press  Red 10x Red 10x Red 10x    ER walk outs  6# 4 laps 6# 4 laps 6# 4 laps                    Pt education KD KD KD KD    HEP Updated  Updated  Updated  Updated     Neuro Re-Ed 10/1 10/3 10/8 10/10 9/26   UBE (retro) for postural re-education/strength L1 x 5 min L1 x 5 min L1 x 5 min L1 x 5 min L1 x 5 min   Prone scapular retraction/depression DC DC DC DC DC   Prone scapular retraction/depression with lift off 10x10\" 1# 10x10\" 2# 10x10\" 2# 10x10\" 2# 10x10\"    Robbery Blue 10x5\"  Blue 10x5\"  Blue 10x5\"  nT Blue 10x5\"    Rows/pulldowns [place]  18/14# 2x10  18/15# 2x10 20/15# 2x10 20/15# 2x10     4-way [place] ste[place]oat Red 20x ea Red 20x ea Red 20x ea Red 20x ea NT   Push up plus 10x at plinth 10x ea low mat  10x at low mat 10x at low mat                                   Ther Act                                                                                 Modalities                                                          "

## 2024-10-15 ENCOUNTER — EVALUATION (OUTPATIENT)
Dept: PHYSICAL THERAPY | Facility: CLINIC | Age: 49
End: 2024-10-15
Payer: COMMERCIAL

## 2024-10-15 DIAGNOSIS — M75.41 IMPINGEMENT SYNDROME OF RIGHT SHOULDER: Primary | ICD-10-CM

## 2024-10-15 DIAGNOSIS — M54.2 NECK PAIN: ICD-10-CM

## 2024-10-15 PROCEDURE — 97110 THERAPEUTIC EXERCISES: CPT

## 2024-10-15 NOTE — PROGRESS NOTES
PT Re-Evaluation     Collection of subjective/objective data performed by Cuong Prasad PTA; Assessment, POC, and Goal attainment performed by Rhonda MACIELT    Today's date: 10/15/2024  Patient name: Ann Toney  : 1975  MRN: [id number]  Referring provider: Abbi Crump CRNP  Dx:   Encounter Diagnosis     ICD-10-CM    1. Impingement syndrome of right shoulder  M75.41       2. Neck pain  M54.2           Start Time: 854  Stop Time: 932  Total time in clinic (min): 38 minutes    Assessment  Impairments: abnormal muscle tone, abnormal or restricted ROM, activity intolerance, impaired physical strength, lacks appropriate home exercise program, pain with function, poor posture  and poor body mechanics    Assessment details: Patient is a 49 y/o female who presents to therapy with complaints of chronic neck and right shoulder pain and has completed 10 visits to date with improved FOTO score of 86 from 52 at IE. Patient demonstrates subjective/objective improvement since starting PT such as improved active range of motion, improved strength, improved sleep at night, and improved ability to reach/lift. Pt is ready for discharge from formal therapy at this time and will transition to HEP. HEP updated and reviewed with patient. Pt is in agreement with this plan.       Barriers to therapy: Chronicity of sx    Goals  ST weeks  Pt will demonstrate good understanding and compliance with HEP  MET  Pt will decrease pain to 3-4/10 with activity   MET  Pt will demonstrate improved postural awareness and ability to self-correct without reliance on external cues MET  Pt will demonstrate centralization of symptoms with repeated movements and/or traction of cervical spine  MET    LT weeks  Pt will improve FOTO score to > or = to 66 to indicate improved functional abilities MET  Pt will decrease pain to 0-1/10 with activity  MET  Pt will increase shoulder strength to 5-/5 for improved  tolerance/independence with ADLs  MET   Pt will increase PROM to WNL to allow for return of AROM of affected shoulder  MET  Pt will increase shoulder flexion/abduction to 170 degrees to increase independence with ADLs MET  Pt will increase functional IR to be able to don/doff bra and wash back without pain MET  Pt will increase functional ER to be able to wash hair independently without pain  MET  Pt will demonstrate abolished radicular symptoms for 2 weeks  MET  Pt will be able to tolerate prolonged standing/sitting activities > 30 minutes without provocation of neck pain MET            Plan  Patient would benefit from: PT eval and skilled physical therapy  Planned modality interventions: cryotherapy, thermotherapy: hydrocollator packs, TENS and unattended electrical stimulation    Planned therapy interventions: ADL training, activity modification, joint mobilization, manual therapy, body mechanics training, neuromuscular re-education, patient/caregiver education, postural training, self care, strengthening, stretching, therapeutic activities, therapeutic exercise, home exercise program, graded exercise, graded activity, functional ROM exercises and flexibility    Frequency: 2x week  Duration in weeks: 8  Plan of Care beginning date: 9/12/2024  Plan of Care expiration date: 11/7/2024  Treatment plan discussed with: patient        Subjective Evaluation    History of Present Illness  Mechanism of injury: RE-EVAL (10/15/24)  Pt has completed 9 PT visits to date and reports 90% improvement.  Reports being more postural aware.  Reports decreased pain intensity and frequency.  Reports improved ability to sleep, reach up back, and doing her hair.  Pt states she is ready transition to HEP.        Pt reports to PT with c/o right shoulder pain that started 15 months ago while lifting a heavy table and noticed onset of right shoulder pain that has episodically bothered her since and now more constant and impacting daily  activities. Pt denies N/T into extremities. Pt does admit hx of chronic neck pain and migraines, states she has recently started working with massage therapist a few months ago for her neck and feels it is helping. Pt denies hx of shoulder issues prior to current episode. Pt had recent x-rays which were negative. Pt is right hand dominant. Pt has increased pain/difficulty with raising arm above shoulder level, rotation of arm, reaching out to the side, heavy lifting/carrying, sleeping, reaching behind back, reaching behind her head to wash hair. Pt is using Advil, ice, and heat as needed for pain relief    Aggravating factors: raising arm above shoulder level, rotation of arm, reaching out to the side, heavy lifting/carrying, sleeping, reaching behind back, reaching behind her head to wash hair    Easing Factors: Advil, ice, and heat    PLOF:  Hx of pain for 15 months, no hx of similar shoulder pain prior to this    PMH: Chronic neck pain and migraines   Patient Goals  Patient goals for therapy: increased motion, decreased pain, increased strength, independence with ADLs/IADLs and return to sport/leisure activities    Pain  Current pain ratin  At best pain ratin  At worst pain ratin  Quality: burning, dull ache, tight, knife-like and sharp    Hand dominance: right      Diagnostic Tests  X-ray: normal  Treatments  Current treatment: massage        Objective     General Comments:      Shoulder Comments       Right shoulder AROM: Flex WNL*  Abd WNL  Functional IR to T1 (min pain) Functional ER to T5   Right shoulder PROM: WNL all planes without pain    Right shoulder MMT: Flex 5/5 Abd 5/5  IR 5/5  ER 5/5    Light touch intact and symmetrical bilateral UE      FOTO: 86  (52 @ eval)      Diagnosis: Chronic neck pain and right shoulder subacromial pain syndrome    Precautions: Chronic neck pain/migraines    POC Expires: 24   Re-evaluation Date: 10/10/24    FOTO Scores/Date: Goal - 66;  -    Visit  "Count 6/10 7/10 3/10 4/10 5/10   Manuals 10/1 10/3 10/8 10/10 10/15   Right shoulder PROM all planes per tolerance KD KD KD KD    Cervical traction KD KD KD KD - instructed  on how to perform    SOR KD KD KD KD - instructed  on how to perform     Seated mid thoracic Gr V KD KD KD KD    Right UT STM/stretch; LS stretch KD KD KD KD    Ther Ex 10/1 10/3 10/8 10/10 10/15   Supine thoracic ext 10x3\"  10x3\" 10x3\"  10x3\"     Wall wash 10x3\" ea (flex/scap) 10x3\" ea w/ lift off 10x3\" ea w/ lift off 10x3\" ea w/ lift off    IR belt stretch  10x10\" 10x10\" 10x10\"  10x10\"     IR stick pass 10x 10x 10x 10x    Vertical ER slides Red 10x Red 10x Red 10x Red 10x    Lateral ER wall walk Red 3 laps Red 3 laps Red 3 laps Red 3 laps    OH press into ER + OH press  Red 10x Red 10x Red 10x    ER walk outs  6# 4 laps 6# 4 laps 6# 4 laps                    Pt education KD KD KD KD FOTO updated subjective objective data collected for Re   HEP Updated  Updated  Updated  Updated  Updated   Neuro Re-Ed 10/1 10/3 10/8 10/10    UBE (retro) for postural re-education/strength L1 x 5 min L1 x 5 min L1 x 5 min L1 x 5 min    Prone scapular retraction/depression DC DC DC DC    Prone scapular retraction/depression with lift off 10x10\" 1# 10x10\" 2# 10x10\" 2# 10x10\" 2#    Robbery Blue 10x5\"  Blue 10x5\"  Blue 10x5\"  nT    Rows/pulldowns [place]  18/14# 2x10  18/15# 2x10 20/15# 2x10 20/15# 2x10     4-way [place] ste[place]oat Red 20x ea Red 20x ea Red 20x ea Red 20x ea    Push up plus 10x at plinth 10x ea low mat  10x at low mat 10x at low mat                                  Ther Act                                                                                Modalities                                            "

## 2024-10-17 ENCOUNTER — APPOINTMENT (OUTPATIENT)
Dept: PHYSICAL THERAPY | Facility: CLINIC | Age: 49
End: 2024-10-17
Payer: COMMERCIAL

## 2024-10-30 ENCOUNTER — OFFICE VISIT (OUTPATIENT)
Dept: GYNECOLOGY | Facility: CLINIC | Age: 49
End: 2024-10-30
Payer: COMMERCIAL

## 2024-10-30 VITALS — SYSTOLIC BLOOD PRESSURE: 118 MMHG | DIASTOLIC BLOOD PRESSURE: 65 MMHG | WEIGHT: 129 LBS | BODY MASS INDEX: 24.37 KG/M2

## 2024-10-30 DIAGNOSIS — N95.1 PERIMENOPAUSE: Primary | ICD-10-CM

## 2024-10-30 PROCEDURE — 99214 OFFICE O/P EST MOD 30 MIN: CPT | Performed by: OBSTETRICS & GYNECOLOGY

## 2024-10-30 NOTE — PROGRESS NOTES
Assessment/Plan:    Menopause vs perimenopause vs hormonal fluctuations at age 48 reviewed. Given that pt is perimenopausal, has no contraindications to estrogen, discussed the option of continuous Nuva Ring to prevent significant hormonal fluctuations. Risks/ACHES/benefits/instructions for use reviewed. Pt would like to do some research on her own, discuss with her  and will notify the office with her decision.     I have spent a total time of 51 minutes in caring for this patient on the day of the visit/encounter including Diagnostic results, Prognosis, Risks and benefits of tx options, Instructions for management, Patient and family education, Importance of tx compliance, Risk factor reductions, Impressions, Counseling / Coordination of care, Documenting in the medical record, Reviewing / ordering tests, medicine, procedures  , and Obtaining or reviewing history  .      Diagnoses and all orders for this visit:    Perimenopause        Subjective:      Patient ID: Ann Toney is a 48 y.o. female.    New pt presents for perimenopausal consult.   Had menses in January of 2024 and then again in May.   Most bothersome sx is sleep disruption - started before summer with night sweats, increased anxiety  and PCP started her on Effexor and sx were much better. Goes to bed at 9-10 pm, up at 3 am and can't get back to sleep.   Pt has occasional hot flashes, not every day, but can occur to 2x per day. Night sweats - 2-3x at night.  Joint aches not bothersome.  , not bothered by vaginal dryness.   Ringing in in right ear.    Pt has had IBS-D since childhood. She now has constipation.   Pt has a TL. She has mammo scheduled. She is UTD on pap and colonoscopy.         The following portions of the patient's history were reviewed and updated as appropriate: allergies, current medications, past family history, past medical history, past social history, past surgical history and problem list.    Review of  Systems   Constitutional:  Positive for fatigue.   Respiratory: Negative.     Cardiovascular: Negative.    Gastrointestinal: Negative.    Endocrine: Positive for heat intolerance.   Genitourinary:  Negative for dyspareunia, dysuria, frequency, pelvic pain, urgency, vaginal bleeding, vaginal discharge and vaginal pain.   Musculoskeletal: Negative.    Skin: Negative.    Neurological: Negative.    Psychiatric/Behavioral:  Positive for decreased concentration and sleep disturbance.          Objective:      /65   Wt 58.5 kg (129 lb)   LMP 10/23/2024 (Approximate)   BMI 24.37 kg/m²          Physical Exam  Vitals and nursing note reviewed.   Constitutional:       Appearance: Normal appearance.   HENT:      Head: Normocephalic and atraumatic.   Pulmonary:      Effort: Pulmonary effort is normal.   Musculoskeletal:         General: Normal range of motion.      Cervical back: Normal range of motion.   Skin:     General: Skin is warm and dry.   Neurological:      Mental Status: She is alert and oriented to person, place, and time.   Psychiatric:         Mood and Affect: Mood normal.         Behavior: Behavior normal.         Thought Content: Thought content normal.         Judgment: Judgment normal.

## 2024-11-01 ENCOUNTER — NURSE TRIAGE (OUTPATIENT)
Age: 49
End: 2024-11-01

## 2024-11-01 NOTE — TELEPHONE ENCOUNTER
Patients GI provider:  TOMY Nunez    Number to return call:  [phone]    Reason for call: Pt calling requesting to speak with Miriam regarding being constipated causing abdomen pain / Taking Miralax 3d now and still no relief.    Scheduled procedure/appointment date if applicable: 4/24/2025 @ 9:30

## 2024-11-01 NOTE — TELEPHONE ENCOUNTER
Last OV: 4/24/24   Hx: IBS-D    Pt transferred to [facility] line.     Reports no BM since Saturday or Sunday, she is feeling bloated, distended hard abdomen and lower abdomen is sore. She did pass very small stool this morning and is passing very little gas. She went to see gynecology on 10/30 and started miralax 3 days ago once a day.   Pt is not taking dicyclomine. She usually has IBS-D and goes on a regular basis.     I advised to increase miralax to BID, colace BID, increase water intake and I scheduled pt for follow up next week due to change in bowel habits.           Additional Information   Affirmative: The patient is passing pebble like stool    Answer Assessment - Initial Assessment Questions  1. When was your last bowel movement? What is the consistency, volume and how frequently are you going or when was the last time you went?  Saturdya or Sunday   Small little bit today     4. If pain is present, when did it start, did the pain recently worsen or stay the same? Does anything make it feel better?   Tenderness in lower abd   5. Where is your pain located, does it radiate, is it constant, intermittent, or does it occur randomly?    Lower abd       9. Are you passing any black or bloody stools?   No   10. Are you taking any over the counter (OTC) or prescribed medications? If so, names, dosage, frequency?   Miralax once a day for 3 days   11. Have you had any recent diet/ lifestyle modifications (life stressors)?   No    Protocols used: GI-IBS (irritable bowel syndrome)-ADULT-OH

## 2024-11-07 ENCOUNTER — OFFICE VISIT (OUTPATIENT)
Dept: GASTROENTEROLOGY | Facility: CLINIC | Age: 49
End: 2024-11-07
Payer: COMMERCIAL

## 2024-11-07 VITALS
WEIGHT: 127 LBS | HEART RATE: 91 BPM | TEMPERATURE: 94.1 F | SYSTOLIC BLOOD PRESSURE: 115 MMHG | HEIGHT: 61 IN | BODY MASS INDEX: 23.98 KG/M2 | OXYGEN SATURATION: 97 % | DIASTOLIC BLOOD PRESSURE: 76 MMHG

## 2024-11-07 DIAGNOSIS — K58.0 IRRITABLE BOWEL SYNDROME WITH DIARRHEA: Primary | ICD-10-CM

## 2024-11-07 DIAGNOSIS — K59.00 CONSTIPATION, UNSPECIFIED CONSTIPATION TYPE: ICD-10-CM

## 2024-11-07 PROCEDURE — 99213 OFFICE O/P EST LOW 20 MIN: CPT | Performed by: PHYSICIAN ASSISTANT

## 2024-11-07 NOTE — PROGRESS NOTES
Ambulatory Visit  Name: Ann Toney      : 1975      MRN: [id number]  Encounter Provider: Miriam Nunez PA-C  Encounter Date: 2024   Encounter department: [facility] [place]    Assessment & Plan  Irritable bowel syndrome with diarrhea  Symptoms were increasingly problematic in September when she admits she was not following her specific dietary needs  She has improved her diet significantly and is back to feeling well  She is eating a very low carbohydrate, high fiber and protein diet  We discussed this in great detail  I encouraged ongoing dietary modification as this has always been the most helpful treatment for her    She is worried about her fiber intake  She is using a packet of Benefiber with a probiotic every morning  She will keep track of her fiber intake but it sounds like she is close if not hitting her goal of 20-25g per day       Constipation, unspecified constipation type  Acute symptoms last week  No BM x 5 days  Finally resolved with Miralax and Colace  She is understandably concerned about what caused this  It could be part of her IBS evolving over time  She is perimenopausal which can contribute         History of Present Illness     Ann Toney is a 48 y.o. female with a history of diarrhea predominant irritable bowel syndrome, anxiety, diverticulitis who presents for evaluation of acute onset of constipation.  The patient has always maintained over time that dietary modifications has been her best treatment records for her irritable bowel syndrome.  She was doing very well with this until September of this year.  She admits that she had a lot of social activities and so was not eating well.  Because of this she was having increasing episodes of abdominal pain, diarrhea and even dry heaves.  In the very beginning of October her and her  put themselves backed on a strict diet.  She is eating a high-protein, high-fiber,  very low carbohydrate diet.  She has been mostly back to feeling well.  She has not had any major episodes of diarrhea.  She reports she is having 1 soft stool a day without urgency or pain.  Last week she had an acute episode of constipation.  She reports that she did not have a bowel movement for 5 days.  She called the office and was advised to use MiraLAX.  She took several capfuls of MiraLAX daily for 2 days and finally had a bowel movement on Saturday morning.  She reports that since that time she is again back to feeling well.  She continues to struggle significantly with her perimenopausal symptoms.  She recently saw a hormone specialist who advised she go on the NuvaRing.  She is not sure if she was comfortable with this.  Her family doctor started her on Effexor earlier this year and attempted control her anxiety symptoms which have been triggered by all of this.  This has been helping her.    History obtained from : patient  Review of Systems   Constitutional:  Negative for activity change, appetite change, fatigue, fever and unexpected weight change.   Respiratory:  Negative for cough, shortness of breath and wheezing.    Gastrointestinal:  Positive for abdominal pain, constipation, diarrhea and nausea. Negative for abdominal distention, blood in stool and vomiting.   Endocrine: Negative for cold intolerance and heat intolerance.   Genitourinary:  Negative for dysuria, flank pain and hematuria.   Neurological:  Negative for dizziness, numbness and headaches.   Psychiatric/Behavioral:  The patient is nervous/anxious.      Pertinent Medical History     Current Outpatient Medications on File Prior to Visit   Medication Sig Dispense Refill    dicyclomine (BENTYL) 20 mg tablet TAKE 1 TABLET (20 MG TOTAL) BY MOUTH EVERY 6 HOURS AS NEEDED FOR ABDOMINAL PAIN (Patient taking differently: prn) 360 tablet 1    methocarbamol (ROBAXIN) 500 mg tablet TAKE 1 TABLET (500 MG TOTAL) BY MOUTH 2 (TWO) TIMES A DAY AS NEEDED  "FOR MUSCLE SPASMS 30 tablet 0    venlafaxine (EFFEXOR-XR) 37.5 mg 24 hr capsule TAKE 1 CAPSULE BY MOUTH DAILY WITH BREAKFAST. 90 capsule 1     No current facility-administered medications on file prior to visit.          Objective     /76 (BP Location: Left arm, Patient Position: Sitting, Cuff Size: Standard)   Pulse 91   Temp (!) 94.1 °F (34.5 °C) (Temporal)   Ht 5' 1\" (1.549 m)   Wt 57.6 kg (127 lb)   LMP 10/23/2024 (Approximate)   SpO2 97%   BMI 24.00 kg/m²     Physical Exam    "

## 2024-11-22 DIAGNOSIS — M62.838 MUSCLE SPASM: ICD-10-CM

## 2024-11-22 RX ORDER — METHOCARBAMOL 500 MG/1
500 TABLET, FILM COATED ORAL 2 TIMES DAILY PRN
Qty: 30 TABLET | Refills: 0 | Status: SHIPPED | OUTPATIENT
Start: 2024-11-22

## 2024-12-12 DIAGNOSIS — M62.838 MUSCLE SPASM: ICD-10-CM

## 2024-12-13 RX ORDER — METHOCARBAMOL 500 MG/1
500 TABLET, FILM COATED ORAL 2 TIMES DAILY PRN
Qty: 30 TABLET | Refills: 0 | Status: SHIPPED | OUTPATIENT
Start: 2024-12-13

## 2024-12-23 ENCOUNTER — HOSPITAL ENCOUNTER (OUTPATIENT)
Dept: ULTRASOUND IMAGING | Facility: CLINIC | Age: 49
Discharge: HOME | End: 2024-12-23
Payer: COMMERCIAL

## 2024-12-23 ENCOUNTER — HOSPITAL ENCOUNTER (OUTPATIENT)
Dept: MAMMOGRAPHY | Facility: CLINIC | Age: 49
Discharge: HOME | End: 2024-12-23
Payer: COMMERCIAL

## 2024-12-23 VITALS — BODY MASS INDEX: 23.98 KG/M2 | HEIGHT: 61 IN | WEIGHT: 127 LBS

## 2024-12-23 DIAGNOSIS — N64.4 BREAST PAIN: ICD-10-CM

## 2024-12-23 PROCEDURE — 77066 DX MAMMO INCL CAD BI: CPT

## 2024-12-23 PROCEDURE — 76642 ULTRASOUND BREAST LIMITED: CPT

## 2024-12-23 PROCEDURE — G0279 TOMOSYNTHESIS, MAMMO: HCPCS

## 2025-01-07 DIAGNOSIS — M62.838 MUSCLE SPASM: ICD-10-CM

## 2025-01-08 RX ORDER — METHOCARBAMOL 500 MG/1
500 TABLET, FILM COATED ORAL 2 TIMES DAILY PRN
Qty: 30 TABLET | Refills: 0 | Status: SHIPPED | OUTPATIENT
Start: 2025-01-08

## 2025-01-14 ENCOUNTER — ANNUAL EXAM (OUTPATIENT)
Dept: OBGYN CLINIC | Facility: CLINIC | Age: 50
End: 2025-01-14
Payer: COMMERCIAL

## 2025-01-14 VITALS — SYSTOLIC BLOOD PRESSURE: 106 MMHG | DIASTOLIC BLOOD PRESSURE: 70 MMHG | WEIGHT: 127.2 LBS | BODY MASS INDEX: 24.03 KG/M2

## 2025-01-14 DIAGNOSIS — Z12.4 PAP SMEAR FOR CERVICAL CANCER SCREENING: ICD-10-CM

## 2025-01-14 DIAGNOSIS — Z01.419 ENCOUNTER FOR GYNECOLOGICAL EXAMINATION: Primary | ICD-10-CM

## 2025-01-14 PROCEDURE — G0145 SCR C/V CYTO,THINLAYER,RESCR: HCPCS | Performed by: OBSTETRICS & GYNECOLOGY

## 2025-01-14 PROCEDURE — G0476 HPV COMBO ASSAY CA SCREEN: HCPCS | Performed by: OBSTETRICS & GYNECOLOGY

## 2025-01-14 PROCEDURE — 99396 PREV VISIT EST AGE 40-64: CPT | Performed by: OBSTETRICS & GYNECOLOGY

## 2025-01-14 NOTE — PROGRESS NOTES
Name: Ann Toney      : 1975      MRN: [id number]  Encounter Provider: Kailey Panda MD  Encounter Date: 2025   Encounter department: [facility]  :  Assessment & Plan  Encounter for gynecological examination  Pap/HPV collected  Mammogram ordered  Colonoscopy current    Encourage healthy diet, exercise, Calcium 1200mg per day and at least 800 iu Vitamin D daily.             History of Present Illness   Gynecologic Exam  She reports no genital itching, genital lesions, genital odor, genital rash, pelvic pain, vaginal bleeding or vaginal discharge. The patient is experiencing no pain. Pertinent negatives include no chills, constipation, diarrhea, fever, nausea, sore throat or vomiting.     Ann Toney is a 49 y.o. female who presents for a routine annual visit    Menarche- Y/O  Last Pap Smear- 21 neg/neg  LMP-24   Birth control-tubal  Mammogram and US R breast-24. B/l screening order already in chart  Colonoscopy-12/15/22 recall 10 years. Dr. Agudelo    Former smoker  -current medical thc  Social drinker  Currently sexually active  No family history of uterine, ovarian, cervical or breast cancer    Would like pap today and discuss hormonal and period changes.         Review of Systems   Constitutional:  Negative for activity change, appetite change, chills, fatigue and fever.   HENT:  Negative for rhinorrhea, sneezing and sore throat.    Eyes:  Negative for visual disturbance.   Respiratory:  Negative for cough, shortness of breath and wheezing.    Cardiovascular:  Negative for chest pain, palpitations and leg swelling.   Gastrointestinal:  Negative for abdominal distention, constipation, diarrhea, nausea and vomiting.   Genitourinary:  Negative for difficulty urinating, pelvic pain and vaginal discharge.   Neurological:  Negative for syncope and light-headedness.          Objective   /70 (BP Location: Left arm,  Patient Position: Sitting, Cuff Size: Standard)   Wt 57.7 kg (127 lb 3.2 oz)   LMP 12/20/2024 (Exact Date)   BMI 24.03 kg/m²      Physical Exam  Chest:   Breasts:     Breasts are symmetrical.      Right: No inverted nipple, mass, nipple discharge, skin change or tenderness.      Left: No inverted nipple, mass, nipple discharge, skin change or tenderness.   Genitourinary:     Labia:         Right: No rash, tenderness, lesion or injury.         Left: No rash, tenderness, lesion or injury.       Vagina: Normal. No vaginal discharge, erythema, tenderness or bleeding.      Cervix: No cervical motion tenderness, discharge, friability, erythema or cervical bleeding.      Uterus: Not deviated, not enlarged, not fixed and not tender.       Adnexa:         Right: No mass, tenderness or fullness.          Left: No mass, tenderness or fullness.     Neurological:      Mental Status: She is alert and oriented to person, place, and time.

## 2025-01-21 ENCOUNTER — RESULTS FOLLOW-UP (OUTPATIENT)
Dept: OBGYN CLINIC | Facility: CLINIC | Age: 50
End: 2025-01-21

## 2025-01-21 LAB
LAB AP GYN PRIMARY INTERPRETATION: NORMAL
Lab: NORMAL

## 2025-03-01 DIAGNOSIS — R23.2 HOT FLASHES: ICD-10-CM

## 2025-03-03 RX ORDER — VENLAFAXINE HYDROCHLORIDE 37.5 MG/1
CAPSULE, EXTENDED RELEASE ORAL
Qty: 90 CAPSULE | Refills: 1 | Status: SHIPPED | OUTPATIENT
Start: 2025-03-03

## 2025-03-06 ENCOUNTER — TELEPHONE (OUTPATIENT)
Age: 50
End: 2025-03-06

## 2025-03-06 NOTE — TELEPHONE ENCOUNTER
Patient advises when she last seen Olga Lidia, she was to call back with her decision on what she wanted to do regarding hormone medications. Patient has decided to go on the vaginal insert that was discussed- states the ring was discussed at this visit. .

## 2025-03-07 ENCOUNTER — TELEPHONE (OUTPATIENT)
Dept: GYNECOLOGY | Facility: CLINIC | Age: 50
End: 2025-03-07

## 2025-03-07 DIAGNOSIS — N95.1 PERIMENOPAUSE: Primary | ICD-10-CM

## 2025-03-07 RX ORDER — ETONOGESTREL AND ETHINYL ESTRADIOL VAGINAL RING .015; .12 MG/D; MG/D
RING VAGINAL
Qty: 5 EACH | Refills: 0 | Status: SHIPPED | OUTPATIENT
Start: 2025-03-07

## 2025-03-07 NOTE — TELEPHONE ENCOUNTER
LM on VM - script for continuous Nuva ring sent. Pt to use a new ring every 3 weeks. Risks/benefits/instructions for use reviewed. My office staff will call pt to schedule perimenopause follow up in 3 months.

## 2025-03-07 NOTE — TELEPHONE ENCOUNTER
----- Message from GRAHAM Uribe sent at 3/7/2025 12:19 PM EST -----  Regarding: 3 month appt  Pls call pt and schedule 30 min perimenopause follow up in 6/2025

## 2025-04-07 ENCOUNTER — HOSPITAL ENCOUNTER (EMERGENCY)
Facility: HOSPITAL | Age: 50
Discharge: HOME | End: 2025-04-07
Payer: COMMERCIAL

## 2025-04-07 ENCOUNTER — APPOINTMENT (EMERGENCY)
Dept: RADIOLOGY | Facility: HOSPITAL | Age: 50
End: 2025-04-07
Payer: COMMERCIAL

## 2025-04-07 VITALS
DIASTOLIC BLOOD PRESSURE: 75 MMHG | OXYGEN SATURATION: 99 % | HEART RATE: 111 BPM | SYSTOLIC BLOOD PRESSURE: 135 MMHG | TEMPERATURE: 98.4 F | RESPIRATION RATE: 16 BRPM

## 2025-04-07 DIAGNOSIS — S93.409A ANKLE SPRAIN: Primary | ICD-10-CM
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
## 2025-04-07 PROCEDURE — 99283 EMERGENCY DEPT VISIT LOW MDM: CPT

## 2025-04-07 PROCEDURE — 73630 X-RAY EXAM OF FOOT: CPT

## 2025-04-07 PROCEDURE — 73610 X-RAY EXAM OF ANKLE: CPT

## 2025-04-07 PROCEDURE — 73590 X-RAY EXAM OF LOWER LEG: CPT

## 2025-04-07 PROCEDURE — 99284 EMERGENCY DEPT VISIT MOD MDM: CPT

## 2025-04-08 NOTE — ED PROVIDER NOTES
Time reflects when diagnosis was documented in both MDM as applicable and the Disposition within this note       Time User Action Codes Description Comment    4/7/2025  8:47 PM Janice Martin Add [S93.409A] Ankle sprain     4/7/2025  8:47 PM Janice Martin Add [W19.XXXA] Fall, initial encounter           ED Disposition       ED Disposition   Discharge    Condition   Stable    Date/Time   Mon Apr 7, 2025  8:47 PM    Comment   Ann Toeny discharge to home/self care.                   Assessment & Plan       Medical Decision Making  49y F p/w ankle injury    Ddx includes but is not limited to fracture, dislocation, sprain, contusion    Plan: XR    Pt declines analgesia    XR without acute findings on my read. Concerning for ankle sprain. Placed in cam boot and given crutches, ortho f/u. Referral placed, contact given. Pt notes normal sensation of toes, no concern for vascular compromise, no concern for compartment syndrome. Strict return precautions given, instructions for rest, NSAID as long as not c/I, ortho f/u. PT verbalized understanding, pt d/c home in good condition.    Amount and/or Complexity of Data Reviewed  Radiology: ordered and independent interpretation performed.        ED Course as of 04/08/25 0028   Mon Apr 07, 2025 2124 Pt placed in cam walker boot, given crutches       Medications - No data to display    ED Risk Strat Scores                            SBIRT 22yo+      Flowsheet Row Most Recent Value   Initial Alcohol Screen: US AUDIT-C     1. How often do you have a drink containing alcohol? 0 Filed at: 04/07/2025 1928   2. How many drinks containing alcohol do you have on a typical day you are drinking?  0 Filed at: 04/07/2025 1928   3b. FEMALE Any Age, or MALE 65+: How often do you have 4 or more drinks on one occassion? 0 Filed at: 04/07/2025 1928   Audit-C Score 0 Filed at: 04/07/2025 1928   TYESHA: How many times in the past year have you...    Used an illegal drug or used a  prescription medication for non-medical reasons? Never Filed at: 2025 192                            History of Present Illness       Chief Complaint   Patient presents with    Ankle Injury     Pt stepped off a curb and twisted her L ankle.        Past Medical History:   Diagnosis Date    Diverticulitis of colon     GERD (gastroesophageal reflux disease)     Headache(784.0)     IBS (irritable bowel syndrome)     Inflammatory bowel disease     Migraines     Varicella       Past Surgical History:   Procedure Laterality Date    ABDOMINOPLASTY N/A 10/08/2020    Procedure: ABDOMINOPLASTY;  Surgeon: German Prasad MD;  Location:  MAIN OR;  Service: Plastics    APPENDECTOMY      BREAST BIOPSY Left     TUBAL LIGATION      US GUIDED BREAST BIOPSY RIGHT COMPLETE Right 2023      Family History   Problem Relation Age of Onset    Migraines Mother     Cancer Father         Testicular at age 19    Hyperlipidemia Daughter     Cancer Paternal Grandmother     Diabetes Son         Type 2024    Hyperlipidemia Son     Ovarian cancer Neg Hx     Cervical cancer Neg Hx     Uterine cancer Neg Hx     Breast cancer Neg Hx       Social History     Tobacco Use    Smoking status: Former     Current packs/day: 0.00     Average packs/day: 0.5 packs/day for 23.0 years (11.5 ttl pk-yrs)     Types: Cigarettes     Start date: 1992     Quit date: 2015     Years since quittin.7    Smokeless tobacco: Never   Vaping Use    Vaping status: Never Used   Substance Use Topics    Alcohol use: Not Currently     Comment: On occasions    Drug use: Yes     Types: Marijuana     Comment: medical marijuana      E-Cigarette/Vaping    E-Cigarette Use Never User       E-Cigarette/Vaping Substances    Nicotine No     THC No     CBD No     Flavoring No     Other No     Unknown No       I have reviewed and agree with the history as documented.     Patient is a 49y F w/ pmhx GERD presenting to the ED for evaluation of ankle pain.  Patient reports she was walking out of an escape room earlier today when she stepped off a step and twisted her L ankle. She notes she fell to the ground but denies any additional injury. Denies surgical history to the affected extremity. Denies any additional injury she wants worked up.        Review of Systems   Constitutional:  Negative for chills and fever.   HENT:  Negative for ear pain and sore throat.    Eyes:  Negative for pain and visual disturbance.   Respiratory:  Negative for cough and shortness of breath.    Cardiovascular:  Negative for chest pain and palpitations.   Gastrointestinal:  Negative for abdominal pain and vomiting.   Genitourinary:  Negative for dysuria and hematuria.   Musculoskeletal:  Positive for joint swelling. Negative for arthralgias and back pain.   Skin:  Negative for color change and rash.   Neurological:  Negative for seizures and syncope.   All other systems reviewed and are negative.          Objective       ED Triage Vitals [04/07/25 1926]   Temperature Pulse Blood Pressure Respirations SpO2 Patient Position - Orthostatic VS   98.4 °F (36.9 °C) (!) 111 135/75 16 99 % Sitting      Temp Source Heart Rate Source BP Location FiO2 (%) Pain Score    Temporal Monitor Left arm -- --      Vitals      Date and Time Temp Pulse SpO2 Resp BP Pain Score FACES Pain Rating User   04/07/25 1926 98.4 °F (36.9 °C) 111 99 % 16 135/75 -- -- TE            Physical Exam  Vitals and nursing note reviewed.   Constitutional:       General: She is not in acute distress.     Appearance: She is well-developed.   HENT:      Head: Normocephalic and atraumatic.   Eyes:      Conjunctiva/sclera: Conjunctivae normal.   Cardiovascular:      Rate and Rhythm: Normal rate and regular rhythm.      Heart sounds: No murmur heard.  Pulmonary:      Effort: Pulmonary effort is normal. No respiratory distress.      Breath sounds: Normal breath sounds.   Abdominal:      Palpations: Abdomen is soft.      Tenderness: There  is no abdominal tenderness.   Musculoskeletal:         General: Swelling, tenderness and signs of injury present.      Cervical back: Neck supple.      Comments: Pain to palpation L lateral malleolus, soft tissue swelling at same spot. No pain w/ palpation base 5th MT, minimal torsional forefoot pain. No tibial, fibular pain, no hip pain to palpation. Tolerates internal/external rotation of femur. No knee pain to palpation. 2+ DP, otherwise normal appearing foot. Able to wiggle toes, nl sensation all 5 toes.   Skin:     General: Skin is warm and dry.      Capillary Refill: Capillary refill takes less than 2 seconds.   Neurological:      Mental Status: She is alert.   Psychiatric:         Mood and Affect: Mood normal.         Results Reviewed       None            XR ankle 3+ views LEFT   ED Interpretation by Janice Martin DO (04/07 2050)   No fracture, dislocation      XR foot 3+ views LEFT   ED Interpretation by Janice Martin DO (04/07 2050)   No fracture, dislocaiton      XR tibia fibula 2 views LEFT   ED Interpretation by Janice Martin DO (04/07 2050)   No fracture, dislocation          Procedures    ED Medication and Procedure Management   Prior to Admission Medications   Prescriptions Last Dose Informant Patient Reported? Taking?   dicyclomine (BENTYL) 20 mg tablet  Self No No   Sig: TAKE 1 TABLET (20 MG TOTAL) BY MOUTH EVERY 6 HOURS AS NEEDED FOR ABDOMINAL PAIN   etonogestrel-ethinyl estradiol (NuvaRing) 0.12-0.015 MG/24HR vaginal ring   No No   Sig: USE CONTINUOUSLY, USING A NEW RING EVERY 3 WEEKS.   methocarbamol (ROBAXIN) 500 mg tablet   No No   Sig: TAKE 1 TABLET (500 MG TOTAL) BY MOUTH 2 (TWO) TIMES A DAY AS NEEDED FOR MUSCLE SPASMS   venlafaxine (EFFEXOR-XR) 37.5 mg 24 hr capsule   No No   Sig: TAKE 1 CAPSULE BY MOUTH DAILY WITH BREAKFAST.      Facility-Administered Medications: None     Discharge Medication List as of 4/7/2025  9:24 PM        CONTINUE these  medications which have NOT CHANGED    Details   dicyclomine (BENTYL) 20 mg tablet TAKE 1 TABLET (20 MG TOTAL) BY MOUTH EVERY 6 HOURS AS NEEDED FOR ABDOMINAL PAIN, Normal      etonogestrel-ethinyl estradiol (NuvaRing) 0.12-0.015 MG/24HR vaginal ring USE CONTINUOUSLY, USING A NEW RING EVERY 3 WEEKS., Normal      methocarbamol (ROBAXIN) 500 mg tablet TAKE 1 TABLET (500 MG TOTAL) BY MOUTH 2 (TWO) TIMES A DAY AS NEEDED FOR MUSCLE SPASMS, Starting Wed 1/8/2025, Normal      venlafaxine (EFFEXOR-XR) 37.5 mg 24 hr capsule TAKE 1 CAPSULE BY MOUTH DAILY WITH BREAKFAST., Normal             ED SEPSIS DOCUMENTATION   Time reflects when diagnosis was documented in both MDM as applicable and the Disposition within this note       Time User Action Codes Description Comment    4/7/2025  8:47 PM Janice Martin [S93.409A] Ankle sprain     4/7/2025  8:47 PM Janice Martin Add [W19.XXXA] Fall, initial encounter                  Janice Martin DO  04/08/25 0028

## 2025-04-08 NOTE — DISCHARGE INSTRUCTIONS
Follow-up with orthopedic surgery, follow-up with your family doctor office. Return to the ER for new/worsening symptoms.

## 2025-04-11 ENCOUNTER — OFFICE VISIT (OUTPATIENT)
Dept: OBGYN CLINIC | Facility: CLINIC | Age: 50
End: 2025-04-11
Payer: COMMERCIAL

## 2025-04-11 VITALS — WEIGHT: 134 LBS | BODY MASS INDEX: 25.3 KG/M2 | HEIGHT: 61 IN
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
## 2025-04-11 DIAGNOSIS — S93.409A ANKLE SPRAIN: Primary | ICD-10-CM

## 2025-04-11 PROCEDURE — 99204 OFFICE O/P NEW MOD 45 MIN: CPT | Performed by: FAMILY MEDICINE

## 2025-04-11 RX ORDER — NAPROXEN 500 MG/1
500 TABLET ORAL 2 TIMES DAILY WITH MEALS
Qty: 60 TABLET | Refills: 0 | Status: SHIPPED | OUTPATIENT
Start: 2025-04-11

## 2025-04-11 NOTE — PROGRESS NOTES
"Accompanied by     Subjective:     Chief Complaint   Patient presents with    Left Ankle - Clicking, Swelling, Numbness, Pain     Feels cold and then get numb    Left Foot - Pain, Clicking, Swelling, Numbness     Feels cold and then get numb     Ann Toney is a 49 y.o. female complains of left ankle pain. Onset of the symptoms was several days ago.  Mechanism of injury:  inverted her ankle while walking off curb . Aggravating factors: direct pressure, walking , and weight bearing. Treatment to date:  cam boot . Symptoms have progressed to a point and plateaued.     The following portions of the patient's history were reviewed and updated as appropriate: allergies, current medications, past family history, past medical history, past social history, past surgical history and problem list.     Occupation:       Review of Systems   Constitutional: Negative for fever.   Musculoskeletal: positive for ankle pain and difficulty walking.      Objective:  Ht 5' 1\" (1.549 m)   Wt 60.8 kg (134 lb)   BMI 25.32 kg/m²      General: No acute distress, not ill appearing, calm and cooperative, AAO X 3  Skin: No abnormality redness or warmth, no signs of infection  Vascular: Dorsalis pedis and posterior tibial pulses palpable, normal cap refill  Left  Ankle Evaluation:  No neurologic deficits in the lower extremity   No gross deformity  + swelling  + ecchymosis  - TTP over fifth metatarsal base  + TTP over lateral malleolus, - medial malleolus  TTP: ATFL +, PTFL +, CFL +, deltoid -  ROM:   Full dorsiflexion  Full plantarflexion  limited inversion and eversion    - Anterior drawer  - Talar tilt    - Syndesmosis squeeze test  - Calcaneal squeeze      Antalgic gait    There is ecchymosis extending to the calf, negative homans  Barnett squeeze does elicit plantar response               Imaging:     XR foot 3+ views LEFT  Result Date: 4/8/2025  Narrative: XR FOOT 3+ VW LEFT INDICATION: foot pain after fall. COMPARISON: " None FINDINGS: No acute fracture or dislocation. No significant degenerative changes. No lytic or blastic osseous lesion. Unremarkable soft tissues.     Impression: No acute osseous abnormality. Computerized Assisted Algorithm (CAA) may have been used to analyze all applicable images. Workstation performed: [id number]     XR ankle 3+ views LEFT  Result Date: 4/8/2025  Narrative: XR ANKLE 3+ VW LEFT INDICATION: lateral malleolus swelling. COMPARISON: None FINDINGS: No acute fracture or dislocation. No significant degenerative changes. No lytic or blastic osseous lesion. Soft tissue swelling over the lateral malleolus.     Impression: No acute osseous abnormality. Computerized Assisted Algorithm (CAA) may have been used to analyze all applicable images. Workstation performed: [id number]     XR tibia fibula 2 views LEFT  Result Date: 4/8/2025  Narrative: XR TIBIA FIBULA 2 VW LEFT INDICATION: fall. COMPARISON: None FINDINGS: No acute fracture or dislocation. No significant degenerative changes. No lytic or blastic osseous lesion. Lateral soft tissue swelling at the ankle.     Impression: No acute osseous abnormality. Computerized Assisted Algorithm (CAA) may have been used to analyze all applicable images. Workstation performed: [id number]           Assessment/Plan:  1. Ankle sprain (Primary)  Clinical impression is that the patient is suffering from an ankle sprain with some mild ankle involvement.  Her Achilles tendon seems to be intact as Barnett squeeze does elicit plantar response.  Her pain is reproducible with palpation over the anterior lateral aspect of the ankle.  Demonstrates negative anterior drawer and talar tilt.  Advised patient to begin home exercise program for ankle range of motion.  Advised ice to the affected area and keep elevated.  Begin naproxen 500 mg twice daily with food.  Return in 2 weeks for a reevaluation.  - Ambulatory Referral to Orthopedic Surgery  - naproxen (Naprosyn) 500 mg  tablet; Take 1 tablet (500 mg total) by mouth 2 (two) times a day with meals  Dispense: 60 tablet; Refill: 0    2. Fall, initial encounter    - Ambulatory Referral to Orthopedic Surgery

## 2025-04-24 ENCOUNTER — OFFICE VISIT (OUTPATIENT)
Dept: GASTROENTEROLOGY | Facility: CLINIC | Age: 50
End: 2025-04-24
Payer: COMMERCIAL

## 2025-04-24 VITALS
BODY MASS INDEX: 25.11 KG/M2 | OXYGEN SATURATION: 99 % | WEIGHT: 133 LBS | SYSTOLIC BLOOD PRESSURE: 121 MMHG | HEART RATE: 91 BPM | DIASTOLIC BLOOD PRESSURE: 84 MMHG | HEIGHT: 61 IN | TEMPERATURE: 97.6 F

## 2025-04-24 DIAGNOSIS — A09 DIARRHEA OF INFECTIOUS ORIGIN: ICD-10-CM

## 2025-04-24 DIAGNOSIS — K58.0 IRRITABLE BOWEL SYNDROME WITH DIARRHEA: Primary | ICD-10-CM

## 2025-04-24 PROCEDURE — 99213 OFFICE O/P EST LOW 20 MIN: CPT | Performed by: PHYSICIAN ASSISTANT

## 2025-04-24 RX ORDER — DICYCLOMINE HCL 20 MG
20 TABLET ORAL EVERY 6 HOURS PRN
Qty: 120 TABLET | Refills: 3 | Status: SHIPPED | OUTPATIENT
Start: 2025-04-24

## 2025-04-24 RX ORDER — OMEPRAZOLE 40 MG/1
40 CAPSULE, DELAYED RELEASE ORAL DAILY
Qty: 30 CAPSULE | Refills: 3 | Status: SHIPPED | OUTPATIENT
Start: 2025-04-24

## 2025-04-24 NOTE — PROGRESS NOTES
"Name: Ann Toney      : 1975      MRN: [id number]  Encounter Provider: Miriam Nunez PA-C  Encounter Date: 2025   Encounter department: [facility] [place]  :  Assessment & Plan  Irritable bowel syndrome with diarrhea  She had been doing very well maintaining a strict diet which was low sugar, high protein  Recently she severely sprained her ankle and has been unable to prepare food as she would like    Her IBS has been very uncontrolled over the past few weeks with urgent, watery diarrhea, severe pain and cramping  She is waking up in the middle of the night to have Bms    She is worried as her son's wedding is in May which will only further complicate her ability to get back on her strict diet    Advised to utilize Bentyl more frequently (currently taking only 1-2 times per day)  Will check labs and stool - she has never been tested for celiac  If normal will repeat a Xifaxan course which worked well for her a few years ago    Continue Benefiber and probiotic    Could trial peppermint oil however she has epigastric burning and a \"hollow\" feeling  Start Omeprazole 40mg daily    Advised to avoid eating for at least 2 hours prior to going to bed      History of Present Illness   Abdominal Pain  This is a recurrent problem. The current episode started in the past 7 days. The onset quality is sudden. The problem occurs daily. The problem has been gradually improving. The pain is located in the suprapubic region. The pain is at a severity of 10/10. The quality of the pain is cramping. The abdominal pain radiates to the epigastric region. Associated symptoms include diarrhea and nausea. Pertinent negatives include no anorexia, arthralgias, belching, constipation, dysuria, fever, flatus, frequency, headaches, hematochezia, hematuria, melena, myalgias, vomiting or weight loss. The pain is aggravated by eating. The pain is relieved by Bowel movements and " "movement.     Ann Toney is a 49 y.o. female with severe diarrhea predominant irritable bowel syndrome who presents for follow-up.  The patient had actually been doing very well after her last appointment and maintaining on a very strict low sugar, high-protein diet.  Unfortunately, 3 weeks ago she sprained her ankle.  This was a severe sprain and because of that she has been unable to prepare food as she would otherwise like to.  She has been eating more processed foods and high sugar foods.  Her symptoms are now out of control.  She reports daily severe abdominal pain and cramping.  She reports nausea and dry heaves.  She reports frequent urgent diarrhea.  She reports waking up in the middle of the night to have bowel movements.  She is using dicyclomine which typically helps but is only taking this 1-2 times a day.  She did start taking it at bedtime trying to eliminate waking up in the middle of the night to have bowel movements.  She reports that her hemorrhoids are flaring up.  She has not had any frankly bloody stools.  Although she has had dry heaves she has had no hematemesis or melena.  She describes a specific discomfort in her upper abdomen which feels like a hunger pain or which she describes as a \"hollow\" feeling.  She also describes it as burning.  When she feels this pain she will try to eat as she thinks that she is hungry and sometimes this helps and sometimes it does not.  She has some heartburn symptoms as well.        Review of Systems   Constitutional:  Positive for fatigue. Negative for fever and weight loss.   Gastrointestinal:  Positive for abdominal pain, diarrhea, nausea and rectal pain. Negative for anorexia, constipation, flatus, hematochezia, melena and vomiting.   Genitourinary:  Negative for dysuria, frequency and hematuria.   Musculoskeletal:  Negative for arthralgias and myalgias.   Neurological:  Negative for headaches.     Medical History Reviewed by provider this " encounter:     .  Past Medical History   Past Medical History:   Diagnosis Date    Diverticulitis of colon     Fibrocystic breast 2005    GERD (gastroesophageal reflux disease)     Headache(784.0)     IBS (irritable bowel syndrome)     Inflammatory bowel disease     Migraines     Varicella      Past Surgical History:   Procedure Laterality Date    ABDOMINOPLASTY N/A 10/08/2020    Procedure: ABDOMINOPLASTY;  Surgeon: German Prasad MD;  Location:  MAIN OR;  Service: Plastics    APPENDECTOMY      BREAST BIOPSY Left 2023    COLONOSCOPY  12/2022    TUBAL LIGATION  2001    UPPER GASTROINTESTINAL ENDOSCOPY  12/2022    US GUIDED BREAST BIOPSY RIGHT COMPLETE Right 08/23/2023     Family History   Problem Relation Age of Onset    Migraines Mother     Cancer Father         Testicular at age 19    Hyperlipidemia Daughter     Cancer Paternal Grandmother     Diabetes Son         Type 1 March 2024    Hyperlipidemia Son     Ovarian cancer Neg Hx     Cervical cancer Neg Hx     Uterine cancer Neg Hx     Breast cancer Neg Hx       reports that she quit smoking about 9 years ago. Her smoking use included cigarettes. She started smoking about 33 years ago. She has a 11.5 pack-year smoking history. She has never used smokeless tobacco. She reports that she does not currently use alcohol. She reports current drug use. Drug: Marijuana.  Current Outpatient Medications   Medication Instructions    dicyclomine (BENTYL) 20 mg, Oral, Every 6 hours PRN    etonogestrel-ethinyl estradiol (NuvaRing) 0.12-0.015 MG/24HR vaginal ring USE CONTINUOUSLY, USING A NEW RING EVERY 3 WEEKS.    methocarbamol (ROBAXIN) 500 mg, Oral, 2 times daily PRN    naproxen (NAPROSYN) 500 mg, Oral, 2 times daily with meals    omeprazole (PRILOSEC) 40 mg, Oral, Daily    rifaximin (XIFAXAN) 550 mg, Oral, Every 8 hours scheduled    rifaximin (XIFAXAN) 550 mg, Oral, Every 8 hours scheduled    venlafaxine (EFFEXOR-XR) 37.5 mg 24 hr capsule TAKE 1 CAPSULE BY MOUTH DAILY WITH  "BREAKFAST.     Allergies   Allergen Reactions    Prochlorperazine Hives and Other (See Comments)    Asa [Aspirin] Vomiting    Ondansetron Anxiety      Current Outpatient Medications on File Prior to Visit   Medication Sig Dispense Refill    etonogestrel-ethinyl estradiol (NuvaRing) 0.12-0.015 MG/24HR vaginal ring USE CONTINUOUSLY, USING A NEW RING EVERY 3 WEEKS. 5 each 0    methocarbamol (ROBAXIN) 500 mg tablet TAKE 1 TABLET (500 MG TOTAL) BY MOUTH 2 (TWO) TIMES A DAY AS NEEDED FOR MUSCLE SPASMS 30 tablet 0    naproxen (Naprosyn) 500 mg tablet Take 1 tablet (500 mg total) by mouth 2 (two) times a day with meals 60 tablet 0    venlafaxine (EFFEXOR-XR) 37.5 mg 24 hr capsule TAKE 1 CAPSULE BY MOUTH DAILY WITH BREAKFAST. 90 capsule 1    [DISCONTINUED] dicyclomine (BENTYL) 20 mg tablet TAKE 1 TABLET (20 MG TOTAL) BY MOUTH EVERY 6 HOURS AS NEEDED FOR ABDOMINAL PAIN (Patient taking differently: Take 20 mg by mouth every 6 (six) hours as needed) 360 tablet 1     No current facility-administered medications on file prior to visit.      Social History     Tobacco Use    Smoking status: Former     Current packs/day: 0.00     Average packs/day: 0.5 packs/day for 23.0 years (11.5 ttl pk-yrs)     Types: Cigarettes     Start date: 1992     Quit date: 2015     Years since quittin.8    Smokeless tobacco: Never   Vaping Use    Vaping status: Never Used   Substance and Sexual Activity    Alcohol use: Not Currently     Comment: On occasions    Drug use: Yes     Types: Marijuana     Comment: As needed for Migraine    Sexual activity: Yes     Partners: Male     Birth control/protection: Female Sterilization        Objective   /84 (BP Location: Right arm, Patient Position: Sitting, Cuff Size: Standard)   Pulse 91   Temp 97.6 °F (36.4 °C) (Temporal)   Ht 5' 1\" (1.549 m)   Wt 60.3 kg (133 lb)   SpO2 99%   BMI 25.13 kg/m²      Physical Exam      "

## 2025-04-25 ENCOUNTER — APPOINTMENT (OUTPATIENT)
Dept: LAB | Facility: CLINIC | Age: 50
End: 2025-04-25
Payer: COMMERCIAL

## 2025-04-25 ENCOUNTER — OFFICE VISIT (OUTPATIENT)
Dept: OBGYN CLINIC | Facility: CLINIC | Age: 50
End: 2025-04-25
Payer: COMMERCIAL

## 2025-04-25 VITALS — HEIGHT: 61 IN | WEIGHT: 134 LBS | BODY MASS INDEX: 25.3 KG/M2

## 2025-04-25 DIAGNOSIS — S93.402D SPRAIN OF LEFT ANKLE, UNSPECIFIED LIGAMENT, SUBSEQUENT ENCOUNTER: Primary | ICD-10-CM

## 2025-04-25 DIAGNOSIS — S86.012D STRAIN OF ACHILLES TENDON, LEFT, SUBSEQUENT ENCOUNTER: ICD-10-CM

## 2025-04-25 DIAGNOSIS — K58.0 IRRITABLE BOWEL SYNDROME WITH DIARRHEA: ICD-10-CM

## 2025-04-25 PROCEDURE — 99213 OFFICE O/P EST LOW 20 MIN: CPT | Performed by: FAMILY MEDICINE

## 2025-04-25 NOTE — PROGRESS NOTES
"Name: Ann Toney      : 1975      MRN: [id number]  Encounter Provider: Owen Stover DO  Encounter Date: 2025   Encounter department: [facility]THOPED[facility] SPECIAL[facility]  :  Assessment & Plan  Sprain of left ankle, unspecified ligament, subsequent encounter  Recommending the patient begin with physical therapy for ankle conditioning and rehabilitation.  Overall she is improving and reports less subjective pain symptoms and able to ambulate without cam boot.  Examination again is reassuring.  Does have some pain over the Achilles tendon however Achilles tendon examination does reveal intact tendon.  She will follow-up in about a month for reevaluation.  Orders:    Ambulatory Referral to Physical Therapy; Future    Strain of Achilles tendon, left, subsequent encounter    Orders:    Ambulatory Referral to Physical Therapy; Future        History of Present Illness   HPI  Ann Toney is a 49 y.o. female who presents for follow-up visit 2 weeks for left ankle sprain injury.  Injury had occurred after everting her ankle while walking off a curb.  Has been treated in cam boot immobilization.  Advised to begin home exercise program at the last visit.  Patient feels some improvement in regards to symptoms.  Able to ambulate without the cam boot.  Still feels some soreness in the posterior aspect of the ankle    History obtained from: patient    Review of Systems  Pertinent Medical History           Medical History Reviewed by provider this encounter:  Tobacco  Allergies  Meds  Problems  Med Hx  Surg Hx  Fam Hx     .     Objective   Ht 5' 1\" (1.549 m)   Wt 60.8 kg (134 lb)   BMI 25.32 kg/m²      Physical Exam      General: No acute distress, not ill appearing, calm and cooperative, AAO X 3  Skin: No abnormality redness or warmth, no signs of infection  Vascular: Dorsalis pedis and posterior tibial pulses palpable, normal cap refill  left Ankle Evaluation:  No neurologic " deficits in the lower extremity   No gross deformity  positive swelling  negative ecchymosis  negative TTP over fifth metatarsal base  + TTP over lateral malleolus, - medial malleolus  TTP: ATFL -, PTFL -, CFL -, deltoid -  +TTP achilles tendon  ROM:   Full dorsiflexion  Full plantarflexion  Full inversion and eversion    + Anterior drawer  - Talar tilt    - Windlass test  - Syndesmosis squeeze test  - Calcaneal squeeze    Normal gait pattern, able to bear weight on affected extremity     Barnett squeeze test elicits plantar response  Able to perform heel and toe walk                Administrative Statements   I have spent a total time of 30 minutes in caring for this patient on the day of the visit/encounter including Instructions for management, Impressions, Counseling / Coordination of care, and Documenting in the medical record.

## 2025-05-12 ENCOUNTER — EVALUATION (OUTPATIENT)
Dept: PHYSICAL THERAPY | Facility: CLINIC | Age: 50
End: 2025-05-12
Attending: FAMILY MEDICINE
Payer: COMMERCIAL

## 2025-05-12 DIAGNOSIS — S86.012D STRAIN OF ACHILLES TENDON, LEFT, SUBSEQUENT ENCOUNTER: ICD-10-CM

## 2025-05-12 DIAGNOSIS — S93.402D SPRAIN OF LEFT ANKLE, UNSPECIFIED LIGAMENT, SUBSEQUENT ENCOUNTER: ICD-10-CM

## 2025-05-12 PROCEDURE — 97161 PT EVAL LOW COMPLEX 20 MIN: CPT | Performed by: PHYSICAL THERAPIST

## 2025-05-12 PROCEDURE — 97110 THERAPEUTIC EXERCISES: CPT | Performed by: PHYSICAL THERAPIST

## 2025-05-12 NOTE — PROGRESS NOTES
PT Evaluation     Today's date: 2025  Patient name: Ann Toney  : 1975  MRN: [id number]  Referring provider: Owen Stover DO  Dx:   Encounter Diagnosis     ICD-10-CM    1. Sprain of left ankle, unspecified ligament, subsequent encounter  S93.402D Ambulatory Referral to Physical Therapy      2. Strain of Achilles tendon, left, subsequent encounter  S86.012D Ambulatory Referral to Physical Therapy          Start Time: 1025  Stop Time: 1100  Total time in clinic (min): 35 minutes    Assessment  Impairments: abnormal or restricted ROM, activity intolerance, impaired balance, impaired physical strength, lacks appropriate home exercise program, pain with function, poor posture , poor body mechanics, unable to perform ADL, participation limitations and activity limitations    Assessment details: Patient is a 49 y.o. female who presents to physical therapy with c/o left ankle pain consistent with lateral ankle sprain/Achilles tendon strain secondary to a fall where pt twisted her ankle on 25. Patient presents to evaluation with pain, decreased range of motion, decreased strength, and decreased tolerance to activity. Patient demonstrates good tolerance to treatment and was provided with a written copy of their initial home exercise program focusing on ankle strength, stability, and flexibility - pt was encouraged to perform daily per tolerance. I discussed risks, benefits, and alternatives to treatment, and answered all patient questions to patient satisfaction. Patient presents with baseline FOTO score of 52 indicating limited tolerance/ability to complete ADLs. Patient is an appropriate candidate for skilled PT and would benefit from skilled PT services to address the aforementioned impairments, achieve goals, maximize function, and improve quality of life. Pt is in agreement with this plan.    Patient Education: activity modifications as needed, pacing of activities, importance of HEP  "compliance, PT prognosis/POC    Barriers to therapy: Financial (high copay)    Goals  ST weeks  Pt will demonstrate good understanding and compliance with HEP  Pt will improve left ankle dorsiflexion to 10 degrees to allow proper toe clearance during mid-swing phase of gait and improve safety with ambulation  Pt will demonstrate pain free ankle AROM WNL all planes   Pt will decrease pain to 3-4/10 with activity     LT weeks  Pt will improve FOTO score to > or = to 74 to indicate improved functional abilities   Pt will be able to perform 25 repetitions with single leg heel raises to indicate improved functional ankle strength/endurance to improve tolerance for ADLs   Pt will be able to perform SLS on compliant and non-compliant surfaces for 30\" to demonstrates improved safety with balance   Pt will decrease pain to 0-1/10 with activity  Pt will be able to ascend/descend flight of stairs without onset of ankle pain or compensatory strategies       Plan  Patient would benefit from: PT eval and skilled physical therapy  Planned modality interventions: cryotherapy and thermotherapy: hydrocollator packs    Planned therapy interventions: ADL training, activity modification, joint mobilization, manual therapy, neuromuscular re-education, body mechanics training, balance, patient/caregiver education, postural training, self care, strengthening, stretching, therapeutic activities, therapeutic exercise, home exercise program, graded motor, graded exercise, gait training, functional ROM exercises and graded activity    Frequency: 1-2x week  Duration in weeks: 8  Plan of Care beginning date: 2025  Plan of Care expiration date: 2025  Treatment plan discussed with: patient        Subjective Evaluation    History of Present Illness  Mechanism of injury: Pt reports to PT with c/o left ankle pain that started on 25 while stepping off a curb when she twisted her ankle, went to ED same day where she had x-rays " "which were negative and plced in CAM boot referred to ortho. Pt was in CAM boot up until recently without any issues but notes transient increase in pain/swelling while weaning from this. Pt notes swelling continues to occur intermittently and worse at the end of the day or after increased activity. Pt denies prior left ankle injury in the past. Pt reports increased pain with prolonged walking, stairs, squatting, going up onto her tip toes, and walking on uneven terrain. Pt finds relief with rest, activity modifications, and ice.    Aggravating factors: prolonged walking, stairs, squatting, going up onto her tip toes, and walking on uneven terrain    Easing Factors:  rest, activity modifications, and ice.    PLOF:  No hx of prior left ankle injuries, full function and pain free prior to injury    PMH: (-)  Patient Goals  Patient goals for therapy: increased motion, decreased pain, improved balance, increased strength, decreased edema, independence with ADLs/IADLs and return to sport/leisure activities    Pain  Current pain ratin  At best pain ratin  At worst pain rating: 10  Quality: dull ache, tight, pulling, sharp and throbbing      Diagnostic Tests  X-ray: normal        Objective     General Comments:      Ankle/Foot Comments   Left ankle AROM: DF 8* (stiff) PF 55* (stiff/tight) Inv 30* (pain) Ev 4* (stiff)    Left ankle MMT: DF 5/5 PF 5/5 Inv 5/5 Ev 5/5    Gait Assessment: Pt ambulates unassisted with mild antalgic gait    Anterior Drawer: (+)  Talar Tilt: (-)  Inversion Test: (+)  Barnett Squeeze: (-)   Syndesmotic Squeeze: (-)  Kleiger Test: (-)     Palpation: TTP along left lateral malleolus and ATFL, Achilles tendon    SLS on level surface: 10\" with moderate ankle/hip strategy   SLS on compliant surface: 4\" with moderate ankle/hip strategy    Single leg heel raises: Left 12 reps (fatigue)    FOTO: 52                 POC expires Unit limit Auth Expiration date PT/OT/ST + Visit Limit?   25 BOMN " "Pending BOMN                           Visit/Unit Tracking  AUTH Status:  Date 5/12              Pending Used 1               Remaining                    Diagnosis: Left ankle sprain, Achilles strain (DOI 4/7/25)   Precautions: (-)   POC Expires: 7/7/25   Re-evaluation Date: 6/9/25   FOTO Scores/Date: Goal - 74; 5/12 - 52   Visit Count 1/10       Manuals 5/12                               Ther Ex 5/12       Gastroc stretch 3x30\"        Ankle 4-way 20x ea blue       SL heel raises 2x10       Lateral band walk w/ TB at forefoot NV                               HEP Creation/instruction       Pt education KD       Neuro Re-Ed 5/12       SLS 5x10\"        Standing BAPS NV       Tandem walk NV                                      Ther Act                                                                                 Modalities                                            "

## 2025-05-15 ENCOUNTER — APPOINTMENT (OUTPATIENT)
Dept: PHYSICAL THERAPY | Facility: CLINIC | Age: 50
End: 2025-05-15
Attending: FAMILY MEDICINE
Payer: COMMERCIAL

## 2025-05-16 ENCOUNTER — APPOINTMENT (OUTPATIENT)
Dept: LAB | Facility: HOSPITAL | Age: 50
End: 2025-05-16
Payer: COMMERCIAL

## 2025-05-16 DIAGNOSIS — A09 DIARRHEA OF INFECTIOUS ORIGIN: ICD-10-CM

## 2025-05-16 DIAGNOSIS — K58.0 IRRITABLE BOWEL SYNDROME WITH DIARRHEA: ICD-10-CM

## 2025-05-16 LAB
ALBUMIN SERPL BCG-MCNC: 4.1 G/DL (ref 3.5–5)
ALP SERPL-CCNC: 40 U/L (ref 34–104)
ALT SERPL W P-5'-P-CCNC: 12 U/L (ref 7–52)
ANION GAP SERPL CALCULATED.3IONS-SCNC: 7 MMOL/L (ref 4–13)
AST SERPL W P-5'-P-CCNC: 11 U/L (ref 13–39)
BILIRUB SERPL-MCNC: 0.29 MG/DL (ref 0.2–1)
BUN SERPL-MCNC: 13 MG/DL (ref 5–25)
CALCIUM SERPL-MCNC: 9 MG/DL (ref 8.4–10.2)
CHLORIDE SERPL-SCNC: 107 MMOL/L (ref 96–108)
CO2 SERPL-SCNC: 26 MMOL/L (ref 21–32)
CREAT SERPL-MCNC: 0.91 MG/DL (ref 0.6–1.3)
CRP SERPL QL: 12.1 MG/L
ERYTHROCYTE [DISTWIDTH] IN BLOOD BY AUTOMATED COUNT: 12 % (ref 11.6–15.1)
GFR SERPL CREATININE-BSD FRML MDRD: 74 ML/MIN/1.73SQ M
GLUCOSE SERPL-MCNC: 88 MG/DL (ref 65–140)
HCT VFR BLD AUTO: 37.9 % (ref 34.8–46.1)
HGB BLD-MCNC: 13.1 G/DL (ref 11.5–15.4)
IGA SERPL-MCNC: 96 MG/DL (ref 66–433)
MCH RBC QN AUTO: 33.9 PG (ref 26.8–34.3)
MCHC RBC AUTO-ENTMCNC: 34.6 G/DL (ref 31.4–37.4)
MCV RBC AUTO: 98 FL (ref 82–98)
PLATELET # BLD AUTO: 288 THOUSANDS/UL (ref 149–390)
PMV BLD AUTO: 10.8 FL (ref 8.9–12.7)
POTASSIUM SERPL-SCNC: 4 MMOL/L (ref 3.5–5.3)
PROT SERPL-MCNC: 6.3 G/DL (ref 6.4–8.4)
RBC # BLD AUTO: 3.86 MILLION/UL (ref 3.81–5.12)
SODIUM SERPL-SCNC: 140 MMOL/L (ref 135–147)
TSH SERPL DL<=0.05 MIU/L-ACNC: 0.72 UIU/ML (ref 0.45–4.5)
WBC # BLD AUTO: 8.75 THOUSAND/UL (ref 4.31–10.16)

## 2025-05-16 PROCEDURE — 86140 C-REACTIVE PROTEIN: CPT

## 2025-05-16 PROCEDURE — 82784 ASSAY IGA/IGD/IGG/IGM EACH: CPT

## 2025-05-16 PROCEDURE — 84443 ASSAY THYROID STIM HORMONE: CPT

## 2025-05-16 PROCEDURE — 85027 COMPLETE CBC AUTOMATED: CPT

## 2025-05-16 PROCEDURE — 80053 COMPREHEN METABOLIC PANEL: CPT

## 2025-05-16 PROCEDURE — 86364 TISS TRNSGLTMNASE EA IG CLAS: CPT

## 2025-05-17 ENCOUNTER — APPOINTMENT (OUTPATIENT)
Dept: LAB | Facility: HOSPITAL | Age: 50
End: 2025-05-17
Payer: COMMERCIAL

## 2025-05-17 PROCEDURE — 87505 NFCT AGENT DETECTION GI: CPT

## 2025-05-17 PROCEDURE — 83993 ASSAY FOR CALPROTECTIN FECAL: CPT

## 2025-05-18 DIAGNOSIS — K58.0 IRRITABLE BOWEL SYNDROME WITH DIARRHEA: ICD-10-CM

## 2025-05-18 LAB
C COLI+JEJUNI TUF STL QL NAA+PROBE: NEGATIVE
EC STX1+STX2 GENES STL QL NAA+PROBE: NEGATIVE
SALMONELLA SP SPAO STL QL NAA+PROBE: NEGATIVE
SHIGELLA SP+EIEC IPAH STL QL NAA+PROBE: NEGATIVE
TTG IGA SER IA-ACNC: <0.4 U/ML (ref ?–10)

## 2025-05-19 LAB — CALPROTECTIN STL-MCNC: 113 ÂΜG/G

## 2025-05-19 RX ORDER — DICYCLOMINE HCL 20 MG
TABLET ORAL
Qty: 360 TABLET | Refills: 1 | Status: SHIPPED | OUTPATIENT
Start: 2025-05-19

## 2025-05-20 ENCOUNTER — OFFICE VISIT (OUTPATIENT)
Dept: PHYSICAL THERAPY | Facility: CLINIC | Age: 50
End: 2025-05-20
Attending: FAMILY MEDICINE
Payer: COMMERCIAL

## 2025-05-20 DIAGNOSIS — S93.402D SPRAIN OF LEFT ANKLE, UNSPECIFIED LIGAMENT, SUBSEQUENT ENCOUNTER: Primary | ICD-10-CM

## 2025-05-20 DIAGNOSIS — S86.012D STRAIN OF ACHILLES TENDON, LEFT, SUBSEQUENT ENCOUNTER: ICD-10-CM

## 2025-05-20 PROCEDURE — 97110 THERAPEUTIC EXERCISES: CPT | Performed by: PHYSICAL THERAPIST

## 2025-05-20 PROCEDURE — 97112 NEUROMUSCULAR REEDUCATION: CPT | Performed by: PHYSICAL THERAPIST

## 2025-05-20 NOTE — PROGRESS NOTES
"Daily Note     Today's date: 2025  Patient name: Ann Toney  : 1975  MRN: [id number]  Referring provider: Owen Stover DO  Dx:   Encounter Diagnosis     ICD-10-CM    1. Sprain of left ankle, unspecified ligament, subsequent encounter  S93.402D       2. Strain of Achilles tendon, left, subsequent encounter  S86.012D           Start Time: 1105  Stop Time: 1145  Total time in clinic (min): 40 minutes    Subjective: Pt reports doing a lot of walking this past weekend while away in [place] where she had some increase in ankle pain/swelling but only lasted through the rest of the day and was resolved by the next day. Pt denies any current pain, only tightness/stiffness in ankle.       Objective: See treatment diary below      Assessment: Pt demonstrates much improved eccentric control with ankle 4-way and denies any pain except mild discomfort with eversion. Introduced ankle DF mobilizations with TB with good tolerance and trialed descending stairs after intervention with subjective improvement noted with ankle discomfort/stiffness. Introduced standing BAPS with significant challenge but gradually improves with control with repetition without increase in ankle pain. Notable improvement in static SLS and now able to hold for up to 25\" with minimal ankle strategy, however increased fatigue noted with more repetition and more ankle/hip strategy noted with final few reps. Pt denies increase in pain post tx, only muscle fatigue/soreness. Pt was issued updated HEP to include new exercises, as well as issued new TB for home use. Will continue to progress next visit as symptoms allow.       Plan: Continue per plan of care.  Progress treatment as tolerated.         POC expires Unit limit Auth Expiration date PT/OT/ST + Visit Limit?   25 BOMN Pending BOMN                           Visit/Unit Tracking  AUTH Status:  Date              10 Approved Used 1 2              Remaining  9 8           " "      Diagnosis: Left ankle sprain, Achilles strain (DOI 4/7/25)   Precautions: (-)   POC Expires: 7/7/25   Re-evaluation Date: 6/9/25   FOTO Scores/Date: Goal - 74; 5/12 - 52   Visit Count 1/10 2/10      Manuals 5/12 5/20                              Ther Ex 5/12 5/20      Gastroc stretch 3x30\"  3x30\"       Ankle DF mob w/ TB  Black 20x3\"      Ankle 4-way 20x ea blue 20x ea blue      SL heel raises 2x10 2x10      Lateral band walk w/ TB at forefoot NV Grn 4 laps                              HEP Creation/instruction Creation/instruction      Pt education KD KD      Neuro Re-Ed 5/12 5/20      SLS 5x10\"  5x15-25\"      Standing BAPS NV L3 10x ea      Tandem walk NV 3 laps fwd/back      SL cone   NT                             Ther Act                                                                                 Modalities                                            "

## 2025-05-21 ENCOUNTER — RESULTS FOLLOW-UP (OUTPATIENT)
Dept: GASTROENTEROLOGY | Facility: CLINIC | Age: 50
End: 2025-05-21

## 2025-05-21 DIAGNOSIS — K58.0 IRRITABLE BOWEL SYNDROME WITH DIARRHEA: Primary | ICD-10-CM

## 2025-05-22 ENCOUNTER — TELEPHONE (OUTPATIENT)
Age: 50
End: 2025-05-22

## 2025-05-22 NOTE — TELEPHONE ENCOUNTER
PA for XIFAXAN  APPROVED     Date(s) approved UNTIL 06/05/2025        This medication is a possible high dollar medication. The patient has not been contacted regarding the decision as the office clinical team will handle advising the patient and if/any patient assistance that may have been started.  Thank you.  Pharmacy advised by    [x]Fax  []Phone call  []Secure Chat    Specialty Pharmacy    []     Approval letter scanned into [employer] Yes

## 2025-05-22 NOTE — TELEPHONE ENCOUNTER
PA for xifaxan 550 mg    SUBMITTED to [employer]    via      [x]Other website Prompt PA EOC ID [id number]       [x]PA sent as URGENT    All office notes, labs and other pertaining documents and studies sent. Clinical questions answered. Awaiting determination from insurance company.     Turnaround time for your insurance to make a decision on your Prior Authorization can take 7-21 business days.

## 2025-05-23 ENCOUNTER — OFFICE VISIT (OUTPATIENT)
Dept: PHYSICAL THERAPY | Facility: CLINIC | Age: 50
End: 2025-05-23
Attending: FAMILY MEDICINE
Payer: COMMERCIAL

## 2025-05-23 DIAGNOSIS — S86.012D STRAIN OF ACHILLES TENDON, LEFT, SUBSEQUENT ENCOUNTER: ICD-10-CM

## 2025-05-23 DIAGNOSIS — S93.402D SPRAIN OF LEFT ANKLE, UNSPECIFIED LIGAMENT, SUBSEQUENT ENCOUNTER: Primary | ICD-10-CM

## 2025-05-23 PROCEDURE — 97110 THERAPEUTIC EXERCISES: CPT | Performed by: PHYSICAL THERAPIST

## 2025-05-23 PROCEDURE — 97112 NEUROMUSCULAR REEDUCATION: CPT | Performed by: PHYSICAL THERAPIST

## 2025-05-23 NOTE — PROGRESS NOTES
"Daily Note     Today's date: 2025  Patient name: Ann Toney  : 1975  MRN: [id number]  Referring provider: Owen Stover DO  Dx:   Encounter Diagnosis     ICD-10-CM    1. Sprain of left ankle, unspecified ligament, subsequent encounter  S93.402D       2. Strain of Achilles tendon, left, subsequent encounter  S86.012D           Start Time: 848  Stop Time: 931  Total time in clinic (min): 43 minutes    Subjective: Pt reports ankle soreness after last visit but no increase in pain, notes good compliance with home exercises      Objective: See treatment diary below      Assessment: Pt continues to progress excellently with ankle strength/stability, able to progress SL heel raises to performing off edge of step to introduce negative component increasing arc of motion with increased fatigue but no pain with cues for slow eccentric control. Pt was able to transition form static SL balance to include SLS w/ side to side DB pass to further challenge frontal plane stability with increased effort/ankle strategy but no pain. Introduced soleus wall sit with good tolerance and able to maintain 30\" holds for first two sets but limited at 10\" hold for third set due to fatigue. Pt denies increase in pain post tx, only muscle fatigue/soreness. HEP was updated and reviewed, will progress next visit as able.       Plan: Continue per plan of care.  Progress treatment as tolerated.         POC expires Unit limit Auth Expiration date PT/OT/ST + Visit Limit?   25 BOMN Pending BOMN                           Visit/Unit Tracking  AUTH Status:  Date             10 Approved Used 1 2 3             Remaining  9 8 7                Diagnosis: Left ankle sprain, Achilles strain (DOI 25)   Precautions: (-)   POC Expires: 25   Re-evaluation Date: 25   FOTO Scores/Date: Goal - 74;  - 52   Visit Count 1/10 2/10 3/10     Manuals                              Ther Ex    " "  Gastroc stretch 3x30\"  3x30\"  3x30\"      Ankle DF mob w/ TB  Black 20x3\" Black 20x3\"      Ankle 4-way 20x ea blue 20x ea blue NT     SL heel raises 2x10 2x10 6x, 7x off edge of step w/ 3-5\" ecc     Lateral band walk w/ TB at forefoot NV Grn 4 laps Grn 4 laps (increase to blue NV)     Soleus wall sit   2x30\", 10\" (fatigue)                             HEP Creation/instruction Creation/instruction Updated      Pt education KD KD KD     Neuro Re-Ed 5/12 5/20 5/23     SLS 5x10\"  5x15-25\" W/ 3# DB pass 10x      Standing BAPS NV L3 10x ea L3 10x ea     Tandem walk NV 3 laps fwd/back 3 laps fwd/back (try airex NV)     SL cone   NT 3 cones to low mat; 3x                            Ther Act                                                                                 Modalities                                              "

## 2025-05-27 ENCOUNTER — OFFICE VISIT (OUTPATIENT)
Dept: PHYSICAL THERAPY | Facility: CLINIC | Age: 50
End: 2025-05-27
Attending: FAMILY MEDICINE
Payer: COMMERCIAL

## 2025-05-27 DIAGNOSIS — S86.012D STRAIN OF ACHILLES TENDON, LEFT, SUBSEQUENT ENCOUNTER: ICD-10-CM

## 2025-05-27 DIAGNOSIS — S93.402D SPRAIN OF LEFT ANKLE, UNSPECIFIED LIGAMENT, SUBSEQUENT ENCOUNTER: Primary | ICD-10-CM

## 2025-05-27 PROCEDURE — 97112 NEUROMUSCULAR REEDUCATION: CPT | Performed by: PHYSICAL THERAPIST

## 2025-05-27 PROCEDURE — 97110 THERAPEUTIC EXERCISES: CPT | Performed by: PHYSICAL THERAPIST

## 2025-05-27 NOTE — PROGRESS NOTES
Daily Note     Today's date: 2025  Patient name: Ann Toney  : 1975  MRN: [id number]  Referring provider: Owen Stover DO  Dx:   Encounter Diagnosis     ICD-10-CM    1. Sprain of left ankle, unspecified ligament, subsequent encounter  S93.402D       2. Strain of Achilles tendon, left, subsequent encounter  S86.012D           Start Time: 851  Stop Time: 930  Total time in clinic (min): 39 minutes    Subjective: Pt denies any current pain but notes increased soreness and discomfort after last visit, associates this to doing new exercises and doing a lot of walking over the weekend.       Objective: See treatment diary below      Assessment: Pt reports increased pain and soreness after last visit that lingered for approximately 2 days and opted to hold exercise progressions this date, as well as regress from SL off edge of step back to the floor but increased volume with 3 sets with notable fatigue noted after 2nd and 3rd sets but no pain. Overall good improvement in quality of balance with tandem walking and dynamic stability exercises without pain. Improved form with soleus wall sits without pain but remains easily fatigued with both soleus and quad strength. Pt denies increase in ankle pain post tx, only muscle fatigue/soreness. HEP was updated and reviewed, will attempt to progress next visit if symptoms allow.       Plan: Continue per plan of care.  Progress treatment as tolerated.         POC expires Unit limit Auth Expiration date PT/OT/ST + Visit Limit?   25 BOMN Pending BOMN                           Visit/Unit Tracking  AUTH Status:  Date            10 Approved Used 1 2 3 4            Remaining  9 8 7 6               Diagnosis: Left ankle sprain, Achilles strain (DOI 25)   Precautions: (-)   POC Expires: 25   Re-evaluation Date: 25   FOTO Scores/Date: Goal - 74;  -    Visit Count 1/10 2/10 3/10 4/10    Manuals                  "            Ther Ex 5/12 5/20 5/23 5/27    Gastroc stretch 3x30\"  3x30\"  3x30\"  3x30\"     Ankle DF mob w/ TB  Black 20x3\" Black 20x3\"  Black 20x3\"     Ankle 4-way 20x ea blue 20x ea blue NT NT    SL heel raises 2x10 2x10 6x, 7x off edge of step w/ 3-5\" ecc 3x10 from floor    Lateral band walk w/ TB at forefoot NV Grn 4 laps Grn 4 laps (increase to blue NV) Grn 4 laps    Soleus wall sit   2x30\", 10\" (fatigue) 30\", 25\", 30\"                            HEP Creation/instruction Creation/instruction Updated  Updated     Pt education KD KD KD KD    Neuro Re-Ed 5/12 5/20 5/23 5/27    SLS 5x10\"  5x15-25\" W/ 3# DB pass 10x  W/ 3# DB pass 10x    Standing BAPS NV L3 10x ea L3 10x ea L3 10x ea    Tandem walk NV 3 laps fwd/back 3 laps fwd/back (try airex NV) 3 laps fwd/back    SL cone   NT 3 cones to low mat; 3x 3 cones to low mat; 3x                           Ther Act                                                                                 Modalities                                                "

## 2025-05-28 DIAGNOSIS — N95.1 PERIMENOPAUSE: ICD-10-CM

## 2025-05-28 RX ORDER — ETONOGESTREL AND ETHINYL ESTRADIOL .12; .015 MG/D; MG/D
RING VAGINAL
Qty: 3 EACH | Refills: 1 | Status: SHIPPED | OUTPATIENT
Start: 2025-05-28

## 2025-05-29 ENCOUNTER — OFFICE VISIT (OUTPATIENT)
Dept: PHYSICAL THERAPY | Facility: CLINIC | Age: 50
End: 2025-05-29
Attending: FAMILY MEDICINE
Payer: COMMERCIAL

## 2025-05-29 DIAGNOSIS — S86.012D STRAIN OF ACHILLES TENDON, LEFT, SUBSEQUENT ENCOUNTER: ICD-10-CM

## 2025-05-29 DIAGNOSIS — S93.402D SPRAIN OF LEFT ANKLE, UNSPECIFIED LIGAMENT, SUBSEQUENT ENCOUNTER: Primary | ICD-10-CM

## 2025-05-29 PROCEDURE — 97110 THERAPEUTIC EXERCISES: CPT | Performed by: PHYSICAL THERAPIST

## 2025-05-29 PROCEDURE — 97112 NEUROMUSCULAR REEDUCATION: CPT | Performed by: PHYSICAL THERAPIST

## 2025-05-29 NOTE — PROGRESS NOTES
"Daily Note     Today's date: 2025  Patient name: Ann Toney  : 1975  MRN: [id number]  Referring provider: Owen Stover DO  Dx:   Encounter Diagnosis     ICD-10-CM    1. Sprain of left ankle, unspecified ligament, subsequent encounter  S93.402D       2. Strain of Achilles tendon, left, subsequent encounter  S86.012D           Start Time: 1058  Stop Time: 1148  Total time in clinic (min): 50 minutes    Subjective: Pt denies any pain, feels ankle is doing great. Denies any pain after last treatment session.      Objective: See treatment diary below      Assessment: Pt continues to progress excellently with ankle strength/stability, able to resume SL heel raises off edge of step to maximize arc of motion with improved tolerance and no pain but notable fatigue after completion of each set. Pt was able to introduce plyometric activities with quick feet and lateral BOSU jump with stick and hold with increased difficulty but no onset of pain, improved ability to maintain balance with BOSU lateral jump with repetition. Increased depth with SL cone  to 12\" box with increased ankle strategy noted but minimal contralateral toe taps required to prevent LOB throughout. Pt denies increase in pain post tx, only muscle fatigue/soreness. Will continue to progress next visit as symptoms allow.       Plan: Continue per plan of care.  Progress treatment as tolerated.         POC expires Unit limit Auth Expiration date PT/OT/ST + Visit Limit?   25 BOMN Pending BOMN                           Visit/Unit Tracking  AUTH Status:  Date           10 Approved Used 1 2 3 4 5           Remaining  9 8 7 6 5              Diagnosis: Left ankle sprain, Achilles strain (DOI 25)   Precautions: (-)   POC Expires: 25   Re-evaluation Date: 25   FOTO Scores/Date: Goal - 74;  -    Visit Count 10 2/10 3/10 /10 5/10   Manuals                            Ther " "Ex 5/12 5/20 5/23 5/27 5/29   Gastroc stretch 3x30\"  3x30\"  3x30\"  3x30\"  3x30\"    Ankle DF mob w/ TB  Black 20x3\" Black 20x3\"  Black 20x3\"  Black 20x3\"    Ankle 4-way 20x ea blue 20x ea blue NT NT NT   SL heel raises 2x10 2x10  3x10 from floor 2x10 off edge of step w/ 3-5\" ecc   Lateral band walk w/ TB at forefoot NV Grn 4 laps Grn 4 laps (increase to blue NV) Grn 4 laps Blue 4 laps   Soleus wall sit   2x30\", 10\" (fatigue) 30\", 25\", 30\" 3x30\"   Quick feet     2x30\" ea fwd/lat 6\" box    TRX     SL squat to low mat 2x10   Lateral bounding     NV                   HEP Creation/instruction Creation/instruction Updated  Updated  Updated    Pt education KD KD KD KD KD   Neuro Re-Ed 5/12 5/20 5/23 5/27 5/29   SLS 5x10\"  5x15-25\" W/ 3# DB pass 10x  W/ 3# DB pass 10x W/ 5# DB pass   Standing BAPS NV L3 10x ea L3 10x ea L3 10x ea L3 10x ea   Tandem walk NV 3 laps fwd/back 3 laps fwd/back (try airex NV) 3 laps fwd/back 3 laps fwd/back airex   SL cone   NT 3 cones to low mat; 3x 3 cones to low mat; 3x 3 cones to 12\" box; 3x   BOSU lateral jump to stick and hold     2x10 ea                                  Ther Act                                                                                 Modalities                                                  "

## 2025-05-30 ENCOUNTER — OFFICE VISIT (OUTPATIENT)
Dept: OBGYN CLINIC | Facility: CLINIC | Age: 50
End: 2025-05-30
Payer: COMMERCIAL

## 2025-05-30 VITALS — HEIGHT: 61 IN | WEIGHT: 137 LBS | BODY MASS INDEX: 25.86 KG/M2

## 2025-05-30 DIAGNOSIS — S93.402D SPRAIN OF LEFT ANKLE, UNSPECIFIED LIGAMENT, SUBSEQUENT ENCOUNTER: Primary | ICD-10-CM

## 2025-05-30 PROCEDURE — 99213 OFFICE O/P EST LOW 20 MIN: CPT | Performed by: FAMILY MEDICINE

## 2025-05-30 NOTE — PROGRESS NOTES
"Name: Ann Toney      : 1975      MRN: [id number]  Encounter Provider: Owen Stover DO  Encounter Date: 2025   Encounter department: [facility]  :  Assessment & Plan  Sprain of left ankle, unspecified ligament, subsequent encounter  Patient has improved following completion of physical therapy.  Advised that she can resume activity as tolerated.  She will follow-up as needed if symptoms recur.           History of Present Illness   HPI  Ann Toney is a 49 y.o. female presents for a follow-up visit for left ankle sprain injury.  Patient was referred to physical therapy for left ankle sprain and reports resolution of pain symptoms.  Occasionally she does feel a slight ache along the ATFL region with prolonged activity or ambulation.  Otherwise she has not had any restriction with motion.      History obtained from: patient    Review of Systems  Pertinent Medical History           Medical History Reviewed by provider this encounter:     .     Objective   Ht 5' 1\" (1.549 m)   Wt 62.1 kg (137 lb)   BMI 25.89 kg/m²      Physical Exam      General: No acute distress, not ill appearing, calm and cooperative, AAO X 3  Skin: No abnormality redness or warmth, no signs of infection  Vascular: Dorsalis pedis and posterior tibial pulses palpable, normal cap refill  left Ankle Evaluation:  No neurologic deficits in the lower extremity   No gross deformity  positive swelling  negative ecchymosis  negative TTP over fifth metatarsal base  - TTP over lateral malleolus, - medial malleolus  TTP: ATFL +, PTFL -, CFL -, deltoid -  -TTP achilles tendon  ROM:   Full dorsiflexion  Full plantarflexion  Full inversion and eversion    + Anterior drawer  - Talar tilt    - Windlass test  - Syndesmosis squeeze test  - Calcaneal squeeze    Normal gait pattern, able to bear weight on affected extremity                   Administrative Statements   I have spent a total time of " 30 minutes in caring for this patient on the day of the visit/encounter including Instructions for management, Impressions, Counseling / Coordination of care, and Documenting in the medical record.

## 2025-06-03 ENCOUNTER — OFFICE VISIT (OUTPATIENT)
Dept: PHYSICAL THERAPY | Facility: CLINIC | Age: 50
End: 2025-06-03
Attending: FAMILY MEDICINE
Payer: COMMERCIAL

## 2025-06-03 DIAGNOSIS — S86.012D STRAIN OF ACHILLES TENDON, LEFT, SUBSEQUENT ENCOUNTER: ICD-10-CM

## 2025-06-03 DIAGNOSIS — S93.402D SPRAIN OF LEFT ANKLE, UNSPECIFIED LIGAMENT, SUBSEQUENT ENCOUNTER: Primary | ICD-10-CM

## 2025-06-03 PROCEDURE — 97112 NEUROMUSCULAR REEDUCATION: CPT | Performed by: PHYSICAL THERAPIST

## 2025-06-03 PROCEDURE — 97110 THERAPEUTIC EXERCISES: CPT | Performed by: PHYSICAL THERAPIST

## 2025-06-03 NOTE — PROGRESS NOTES
"Daily Note     Today's date: 6/3/2025  Patient name: Ann Toney  : 1975  MRN: [id number]  Referring provider: Owen Stover DO  Dx:   Encounter Diagnosis     ICD-10-CM    1. Sprain of left ankle, unspecified ligament, subsequent encounter  S93.402D       2. Strain of Achilles tendon, left, subsequent encounter  S86.012D           Start Time: 0800  Stop Time: 0850  Total time in clinic (min): 50 minutes    Subjective: Pt denies any pain today, notes soreness after last visit but no pain.      Objective: See treatment diary below      Assessment: Pt continues to progress well with higher level stability exercises without onset of pain, able to increase frontal plane stability exercises with lateral bounding which was introduced with TRX straps for UE assist but able to progress to being performed unassisted over 6\" javid without pain but notable challenge. Pt was also able to introduced SL rebounder 3-way with most challenge noted with lateral throws with intermittent contralateral toe taps to prevent LOB. Pt denies increase in pain post tx, only muscle fatigue/soreness. Will continue to progress next visit as symptoms allow.      Plan: Continue per plan of care.  Progress treatment as tolerated.         POC expires Unit limit Auth Expiration date PT/OT/ST + Visit Limit?   25 BOMN Pending BOMN                           Visit/Unit Tracking  AUTH Status:  Date 5/12 5/20 5/23 5/27 5/29 6/3         10 Approved Used 1 2 3 4 5 6          Remaining  9 8 7 6 5 4             Diagnosis: Left ankle sprain, Achilles strain (DOI 25)   Precautions: (-)   POC Expires: 25   Re-evaluation Date: 25   FOTO Scores/Date: Goal - 74;  -    Visit Count 6/10 2/10 3/10 4/10 5/10   Manuals 6/3 5/20 5/23 5/27 5/29                           Ther Ex 6/3 5/20 5/23 5/27 5/29   Gastroc stretch 3x30\"  3x30\"  3x30\"  3x30\"  3x30\"    Ankle DF mob w/ TB Black 20x3\"  Black 20x3\" Black 20x3\"  Black 20x3\"  Black 20x3\"  " "  Ankle 4-way  20x ea blue NT NT NT   SL heel raises 2x10 off edge of step w/ 3-5\" ecc 2x10  3x10 from floor 2x10 off edge of step w/ 3-5\" ecc   Lateral band walk w/ TB at forefoot Blue 4 laps Grn 4 laps Grn 4 laps (increase to blue NV) Grn 4 laps Blue 4 laps   Soleus wall sit NT  2x30\", 10\" (fatigue) 30\", 25\", 30\" 3x30\"   Quick feet 8\" box 2x30\" ea fwd/lat    2x30\" ea fwd/lat 6\" box    TRX SL squat to low mat 2x10    SL squat to low mat 2x10   Lateral bounding 10x w/ TRX, 10x unassisted over 6\" javid    NV                   HEP Updated  Creation/instruction Updated  Updated  Updated    Pt education KD KD KD KD KD   Neuro Re-Ed 6/3 5/20 5/23 5/27 5/29   SLS W/ 5# DB pass 5x15-25\" W/ 3# DB pass 10x  W/ 3# DB pass 10x W/ 5# DB pass   Standing BAPS L3 10x ea L3 10x ea L3 10x ea L3 10x ea L3 10x ea   Tandem walk DC 3 laps fwd/back 3 laps fwd/back (try airex NV) 3 laps fwd/back 3 laps fwd/back airex   SL cone  3 cones to 12\" box; 3x NT 3 cones to low mat; 3x 3 cones to low mat; 3x 3 cones to 12\" box; 3x   BOSU lateral jump to stick and hold 2x10 ea    2x10 ea   SL rebounder 3# 3-way 10x ea                              Ther Act                                                                                 Modalities                                                    "

## 2025-06-04 ENCOUNTER — APPOINTMENT (OUTPATIENT)
Dept: PHYSICAL THERAPY | Facility: CLINIC | Age: 50
End: 2025-06-04
Attending: FAMILY MEDICINE
Payer: COMMERCIAL

## 2025-06-05 ENCOUNTER — OFFICE VISIT (OUTPATIENT)
Dept: PHYSICAL THERAPY | Facility: CLINIC | Age: 50
End: 2025-06-05
Attending: FAMILY MEDICINE
Payer: COMMERCIAL

## 2025-06-05 DIAGNOSIS — S86.012D STRAIN OF ACHILLES TENDON, LEFT, SUBSEQUENT ENCOUNTER: ICD-10-CM

## 2025-06-05 DIAGNOSIS — S93.402D SPRAIN OF LEFT ANKLE, UNSPECIFIED LIGAMENT, SUBSEQUENT ENCOUNTER: Primary | ICD-10-CM

## 2025-06-05 PROCEDURE — 97110 THERAPEUTIC EXERCISES: CPT | Performed by: PHYSICAL THERAPIST

## 2025-06-05 NOTE — PROGRESS NOTES
"PT Discharge    Today's date: 2025  Patient name: Ann Toney  : 1975  MRN: [id number]  Referring provider: Owen Stover DO  Dx:   Encounter Diagnosis     ICD-10-CM    1. Sprain of left ankle, unspecified ligament, subsequent encounter  S93.402D       2. Strain of Achilles tendon, left, subsequent encounter  S86.012D           Start Time: 803  Stop Time: 845  Total time in clinic (min): 42 minutes    Assessment    Assessment details: Pt has been seen for 7 visits and has made excellent subjective/objective improvements since enrolling in therapy with improved FOTO score from 52 to 99 since initial evaluation. Pt has returned all normal ADLs and recreational activities without pain or limitation and is appropriate for discharge to home exercise program at this time. Pt encouraged to continue with HEP on regular basis per tolerance and was educated on how to progress/regress exercises per symptoms/tolerance. Pt is in agreement with plan.        Goals  ST weeks  Pt will demonstrate good understanding and compliance with HEP MET  Pt will improve left ankle dorsiflexion to 10 degrees to allow proper toe clearance during mid-swing phase of gait and improve safety with ambulation MET  Pt will demonstrate pain free ankle AROM WNL all planes MET  Pt will decrease pain to 3-4/10 with activity MET    LT weeks  Pt will improve FOTO score to > or = to 74 to indicate improved functional abilities MET  Pt will be able to perform 25 repetitions with single leg heel raises to indicate improved functional ankle strength/endurance to improve tolerance for ADLs NEARLY MET  Pt will be able to perform SLS on compliant and non-compliant surfaces for 30\" to demonstrates improved safety with balance NEARLY MET  Pt will decrease pain to 0-1/10 with activity MET  Pt will be able to ascend/descend flight of stairs without onset of ankle pain or compensatory strategies MET      Plan  Patient would benefit from: PT " eval and skilled physical therapy  Planned modality interventions: cryotherapy and thermotherapy: hydrocollator packs    Planned therapy interventions: ADL training, activity modification, joint mobilization, manual therapy, neuromuscular re-education, body mechanics training, balance, patient/caregiver education, postural training, self care, strengthening, stretching, therapeutic activities, therapeutic exercise, home exercise program, graded motor, graded exercise, gait training, functional ROM exercises and graded activity    Plan of Care beginning date: 5/12/2025  Plan of Care expiration date: 7/7/2025  Treatment plan discussed with: patient  Plan details: Discharge to [facility]        Subjective Evaluation    History of Present Illness  Mechanism of injury: DISCHARGE:   Pt has completed 7 visits to date and notes significant improvement in ankle pain/function. Pt denies any pain with ADLs or ankle instability. Pt has been able to complete more aggressive balance activities and jumping exercises without pain. Pt feels she is ready to transition to a home program.     IE:  Pt reports to PT with c/o left ankle pain that started on 4/7/25 while stepping off a curb when she twisted her ankle, went to ED same day where she had x-rays which were negative and plced in CAM boot referred to ortho. Pt was in CAM boot up until recently without any issues but notes transient increase in pain/swelling while weaning from this. Pt notes swelling continues to occur intermittently and worse at the end of the day or after increased activity. Pt denies prior left ankle injury in the past. Pt reports increased pain with prolonged walking, stairs, squatting, going up onto her tip toes, and walking on uneven terrain. Pt finds relief with rest, activity modifications, and ice.    Aggravating factors: prolonged walking, stairs, squatting, going up onto her tip toes, and walking on uneven terrain    Easing Factors:  rest, activity  "modifications, and ice.    PLOF:  No hx of prior left ankle injuries, full function and pain free prior to injury    PMH: (-)  Pain  Current pain ratin  At best pain ratin  At worst pain rating: 3  Quality: dull ache      Diagnostic Tests  X-ray: normal        Objective     General Comments:      Ankle/Foot Comments   Left ankle AROM: DF 15* PF 60* Inv 35* Ev 10*    Left ankle MMT: DF / PF / Inv  Ev     Gait Assessment: Pt ambulates unassisted with normal gait mechanics    SLS on level surface: 23\" with minimal ankle strategy   SLS on compliant surface: >30\" with minimal ankle strategy    Single leg heel raises: Left 23 reps (fatigue)    FOTO: 99 (improved from 52 at IE)                 POC expires Unit limit Auth Expiration date PT/OT/ST + Visit Limit?   25 BOMN Pending BOMN                           Visit/Unit Tracking  AUTH Status:  Date 5/12 5/20 5/23 5/27 5/29 6/3 6/5        10 Approved Used 1 2 3 4 5 6 5         Remaining  9 8 7 6 5 4 5            Diagnosis: Left ankle sprain, Achilles strain (DOI 25)   Precautions: (-)   POC Expires: 25   Re-evaluation Date: 25   FOTO Scores/Date: Goal - 74;  - 52;  -    Visit Count 6/10 7/10 3/10 4/10 5/10   Manuals 6/3 6/5 5/23 5/27 5/29                           Ther Ex 6/3 6/5 5/23 5/27 5/29   Gastroc stretch 3x30\"  3x30\"  3x30\"  3x30\"  3x30\"    Ankle DF mob w/ TB Black 20x3\"  Black 20x3\" Black 20x3\"  Black 20x3\"  Black 20x3\"    Ankle 4-way   NT NT NT   SL heel raises 2x10 off edge of step w/ 3-5\" ecc   3x10 from floor 2x10 off edge of step w/ 3-5\" ecc   Lateral band walk w/ TB at forefoot Blue 4 laps  Grn 4 laps (increase to blue NV) Grn 4 laps Blue 4 laps   Soleus wall sit NT  2x30\", 10\" (fatigue) 30\", 25\", 30\" 3x30\"   Quick feet 8\" box 2x30\" ea fwd/lat 8\" box 2x30\" ea fwd/lat   2x30\" ea fwd/lat 6\" box    TRX SL squat to low mat 2x10    SL squat to low mat 2x10   Lateral bounding 10x w/ TRX, 10x unassisted over 6\" javid    NV " "                  HEP Updated   Updated  Updated  Updated    Pt education KD Re-assessed left ankle and FOTO; HEP creation/instruction, educated on importance of continued HEP compliance and how to progress/regress exercises as needed KD KD KD   Neuro Re-Ed 6/3 6/4 5/23 5/27 5/29   SLS W/ 5# DB pass  W/ 3# DB pass 10x  W/ 3# DB pass 10x W/ 5# DB pass   Standing BAPS L3 10x ea  L3 10x ea L3 10x ea L3 10x ea   Tandem walk DC  3 laps fwd/back (try airex NV) 3 laps fwd/back 3 laps fwd/back airex   SL cone  3 cones to 12\" box; 3x  3 cones to low mat; 3x 3 cones to low mat; 3x 3 cones to 12\" box; 3x   BOSU lateral jump to stick and hold 2x10 ea 2x10 ea   2x10 ea   SL rebounder 3# 3-way 10x ea 3# 3-way 10x ea                             Ther Act                                                                                 Modalities                                            "

## 2025-06-06 ENCOUNTER — APPOINTMENT (OUTPATIENT)
Dept: PHYSICAL THERAPY | Facility: CLINIC | Age: 50
End: 2025-06-06
Attending: FAMILY MEDICINE
Payer: COMMERCIAL

## 2025-06-16 ENCOUNTER — OFFICE VISIT (OUTPATIENT)
Dept: GYNECOLOGY | Facility: CLINIC | Age: 50
End: 2025-06-16
Payer: COMMERCIAL

## 2025-06-16 VITALS
DIASTOLIC BLOOD PRESSURE: 68 MMHG | BODY MASS INDEX: 25.86 KG/M2 | SYSTOLIC BLOOD PRESSURE: 110 MMHG | HEIGHT: 61 IN | WEIGHT: 137 LBS

## 2025-06-16 DIAGNOSIS — N95.1 PERIMENOPAUSE: Primary | ICD-10-CM

## 2025-06-16 PROCEDURE — 99215 OFFICE O/P EST HI 40 MIN: CPT | Performed by: OBSTETRICS & GYNECOLOGY

## 2025-06-16 RX ORDER — NORETHINDRONE ACETATE AND ETHINYL ESTRADIOL, ETHINYL ESTRADIOL AND FERROUS FUMARATE 1MG-10(24)
10 KIT ORAL DAILY
Qty: 90 TABLET | Refills: 0 | Status: SHIPPED | OUTPATIENT
Start: 2025-06-16

## 2025-06-16 NOTE — PROGRESS NOTES
"Name: Ann Toney      : 1975      MRN: [id number]  Encounter Provider: GRAHAM Mancilla  Encounter Date: 2025   Encounter department: [facility]  :  Assessment & Plan  Perimenopause  Discussed perimenopause at length. Risks/benefits of trying 10 mcg OCP continuously vs HRT dose of estradiol patch and oral progesterone. Pt has opted to try continuous 10 mcg OCP first. Pt advised to notify office with any SE. Risks of blood clot, stroke reviewed. Pt will RTO in 3 months for perimenopause follow up, 30 mins.   Orders:    Norethin-Eth Estrad-Fe Biphas (Lo Loestrin Fe) 1 MG-10 MCG / 10 MCG TABS; Take 10 mcg by mouth in the morning CONTINUOUS USE, SKIPPING PLACEBO        History of Present Illness   Pt presents for perimenopause follow up.   She was started on continuous Nuva Ring 3/6/25.  She experienced many side effects including daily headaches, nausea, hot flashes, night sweats, instant weight gain and bloating, poor gut health, breast tendess. She was not using the nuva ring continuously. Sx started the 2nd month of using Nuva Ring.           Review of Systems       Objective   /68 (BP Location: Right arm, Patient Position: Sitting, Cuff Size: Standard)   Ht 5' 1\" (1.549 m)   Wt 62.1 kg (137 lb)   LMP 2025 (Exact Date)   BMI 25.89 kg/m²      Physical Exam  Vitals and nursing note reviewed.   Constitutional:       Appearance: Normal appearance.   HENT:      Head: Normocephalic and atraumatic.   Pulmonary:      Effort: Pulmonary effort is normal.     Musculoskeletal:         General: Normal range of motion.      Cervical back: Normal range of motion.     Skin:     General: Skin is warm and dry.     Neurological:      Mental Status: She is alert and oriented to person, place, and time.     Psychiatric:         Mood and Affect: Mood normal.         Behavior: Behavior normal.         Thought Content: Thought content normal.         " Judgment: Judgment normal.         Administrative Statements   I have spent a total time of 41 minutes in caring for this patient on the day of the visit/encounter including Prognosis, Risks and benefits of tx options, Instructions for management, Patient and family education, Importance of tx compliance, Risk factor reductions, Impressions, Counseling / Coordination of care, Documenting in the medical record, Reviewing/placing orders in the medical record (including tests, medications, and/or procedures), and Obtaining or reviewing history  .

## 2025-07-18 ENCOUNTER — OFFICE VISIT (OUTPATIENT)
Dept: GASTROENTEROLOGY | Facility: CLINIC | Age: 50
End: 2025-07-18
Payer: COMMERCIAL

## 2025-07-18 VITALS
BODY MASS INDEX: 26.24 KG/M2 | HEIGHT: 61 IN | OXYGEN SATURATION: 99 % | WEIGHT: 139 LBS | DIASTOLIC BLOOD PRESSURE: 76 MMHG | HEART RATE: 75 BPM | TEMPERATURE: 97.5 F | SYSTOLIC BLOOD PRESSURE: 120 MMHG

## 2025-07-18 DIAGNOSIS — A09 DIARRHEA OF INFECTIOUS ORIGIN: Primary | ICD-10-CM

## 2025-07-18 DIAGNOSIS — K58.0 IRRITABLE BOWEL SYNDROME WITH DIARRHEA: ICD-10-CM

## 2025-07-18 PROCEDURE — 99213 OFFICE O/P EST LOW 20 MIN: CPT | Performed by: PHYSICIAN ASSISTANT

## 2025-07-18 NOTE — PROGRESS NOTES
Name: Ann Toney      : 1975      MRN: [id number]  Encounter Provider: Miriam Nunez PA-C  Encounter Date: 2025   Encounter department: [facility] [place]  :  Assessment & Plan  Irritable bowel syndrome with diarrhea  At her last appointment in April she was not doing well with urgent, watery diarrhea, severe pain and cramping  She was reporting nocturnal symptoms    Historically she has always done very well on a very strict diet which is low sugar and high-protein  Prior to her last appointment she has sprained her ankle and was unable to prepare food as she would otherwise have done  She was also suffering with menopausal symptoms and started on hormonal supplements    Lab work and stool testing was ordered noting a slight elevation in her C-reactive protein but otherwise unremarkable including a fecal calprotectin    She completed a Xifaxan course and stopped her hormonal replacement therapy  She is feeling much improved  She has been able to get herself back on her routine diet    Her bowel movements are regular  She is not having pain or bloating    Continue present management    She is requesting to repeat a CRP - ordered            History of Present Illness   HPI  Ann Toney is a 49 y.o. female with a history of irritable bowel syndrome, diverticulitis, migraine headaches who presents for routine follow-up.  At her last appointment in April she was having significant symptoms of abdominal pain and urgent watery diarrhea.  The symptoms were so severe that she reports she was being awoken at night to have bowel movements.  Just prior to symptom onset she had sprained her ankle and so she was unable to follow her strict Mediterranean, low sugar, high-protein diet.  She was also suffering with perimenopausal symptoms and was started on hormone replacement therapy.  Lab work was ordered which noted a slight elevation in her C-reactive  "protein but was otherwise unremarkable including a normal fecal calprotectin.  She completed a Xifaxan course which she admits helped her symptoms dramatically.  She stopped the hormone replacement therapy as well which she knows was contributing.  Thankfully, she has been able to get back on track with her diet.  She is back to feeling very well.  She is not having any diarrhea or abdominal pain at present.        Review of Systems  Medical History Reviewed by provider this encounter:  Problems     .  Past Medical History   Past Medical History[1]  Past Surgical History[2]  Family History[3]   reports that she quit smoking about 10 years ago. Her smoking use included cigarettes. She started smoking about 33 years ago. She has a 11.5 pack-year smoking history. She has never used smokeless tobacco. She reports current alcohol use. She reports current drug use. Drug: Marijuana.  Current Outpatient Medications   Medication Instructions    dicyclomine (BENTYL) 20 mg tablet TAKE 1 TABLET (20 MG TOTAL) BY MOUTH EVERY 6 HOURS AS NEEDED FOR ABDOMINAL PAIN    methocarbamol (ROBAXIN) 500 mg, Oral, 2 times daily PRN    venlafaxine (EFFEXOR-XR) 37.5 mg 24 hr capsule TAKE 1 CAPSULE BY MOUTH DAILY WITH BREAKFAST.   Allergies[4]   Medications Ordered Prior to Encounter[5]   Social History[6]     Objective   /76 (BP Location: Right arm, Patient Position: Sitting, Cuff Size: Standard)   Pulse 75   Temp 97.5 °F (36.4 °C) (Temporal)   Ht 5' 1\" (1.549 m)   Wt 63 kg (139 lb)   SpO2 99%   BMI 26.26 kg/m²      Physical Exam           [1]   Past Medical History:  Diagnosis Date    Diverticulitis of colon     Fibrocystic breast 2005    GERD (gastroesophageal reflux disease)     Headache(784.0)     IBS (irritable bowel syndrome)     Inflammatory bowel disease     Migraines     Varicella    [2]   Past Surgical History:  Procedure Laterality Date    ABDOMINOPLASTY N/A 10/08/2020    Procedure: ABDOMINOPLASTY;  Surgeon: German" MD Shanice;  Location:  MAIN OR;  Service: Plastics    APPENDECTOMY      BREAST BIOPSY Left 2023    COLONOSCOPY  12/2022    TUBAL LIGATION  2001    UPPER GASTROINTESTINAL ENDOSCOPY  12/2022    US GUIDED BREAST BIOPSY RIGHT COMPLETE Right 08/23/2023   [3]   Family History  Problem Relation Name Age of Onset    Migraines Mother Patricia     Cancer Father Juan Manuel         Testicular at age 19    Hyperlipidemia Daughter Renetta     Cancer Paternal Grandmother Unknown     Diabetes Son Sami         Type 1  March 2024    Hyperlipidemia Son Sami     Ovarian cancer Neg Hx      Cervical cancer Neg Hx      Uterine cancer Neg Hx      Breast cancer Neg Hx     [4]   Allergies  Allergen Reactions    Prochlorperazine Hives and Other (See Comments)    Asa [Aspirin] Vomiting    Ondansetron Anxiety   [5]   Current Outpatient Medications on File Prior to Visit   Medication Sig Dispense Refill    dicyclomine (BENTYL) 20 mg tablet TAKE 1 TABLET (20 MG TOTAL) BY MOUTH EVERY 6 HOURS AS NEEDED FOR ABDOMINAL PAIN 360 tablet 1    methocarbamol (ROBAXIN) 500 mg tablet TAKE 1 TABLET (500 MG TOTAL) BY MOUTH 2 (TWO) TIMES A DAY AS NEEDED FOR MUSCLE SPASMS 30 tablet 0    venlafaxine (EFFEXOR-XR) 37.5 mg 24 hr capsule TAKE 1 CAPSULE BY MOUTH DAILY WITH BREAKFAST. 90 capsule 1    [DISCONTINUED] omeprazole (PriLOSEC) 40 MG capsule Take 1 capsule (40 mg total) by mouth daily 30 capsule 3     No current facility-administered medications on file prior to visit.   [6]   Social History  Tobacco Use    Smoking status: Former     Current packs/day: 0.00     Average packs/day: 0.5 packs/day for 23.0 years (11.5 ttl pk-yrs)     Types: Cigarettes     Start date: 1/1/1992     Quit date: 7/4/2015     Years since quitting: 10.0    Smokeless tobacco: Never   Vaping Use    Vaping status: Never Used   Substance and Sexual Activity    Alcohol use: Yes     Comment: On occasions    Drug use: Yes     Types: Marijuana     Comment: As needed for Migraine    Sexual  activity: Yes     Partners: Male     Birth control/protection: Female Sterilization

## 2025-07-24 ENCOUNTER — APPOINTMENT (OUTPATIENT)
Dept: LAB | Facility: CLINIC | Age: 50
End: 2025-07-24
Payer: COMMERCIAL

## 2025-07-24 DIAGNOSIS — A09 DIARRHEA OF INFECTIOUS ORIGIN: ICD-10-CM

## 2025-07-24 LAB — CRP SERPL QL: 1.7 MG/L

## 2025-07-24 PROCEDURE — 86140 C-REACTIVE PROTEIN: CPT

## 2025-07-24 PROCEDURE — 36415 COLL VENOUS BLD VENIPUNCTURE: CPT

## (undated) DEVICE — INTENDED FOR TISSUE SEPARATION, AND OTHER PROCEDURES THAT REQUIRE A SHARP SURGICAL BLADE TO PUNCTURE OR CUT.: Brand: BARD-PARKER SAFETY BLADES SIZE 11, STERILE

## (undated) DEVICE — 4-PORT MANIFOLD: Brand: NEPTUNE 2

## (undated) DEVICE — TRAY FOLEY 16FR URIMETER SURESTEP

## (undated) DEVICE — STERILE POLYISOPRENE POWDER-FREE SURGICAL GLOVES: Brand: PROTEXIS

## (undated) DEVICE — INTENDED FOR TISSUE SEPARATION, AND OTHER PROCEDURES THAT REQUIRE A SHARP SURGICAL BLADE TO PUNCTURE OR CUT.: Brand: BARD-PARKER ® SAFETYLOCK CARBON RIB-BACK BLADES

## (undated) DEVICE — TABLE COVER: Brand: CONVERTORS

## (undated) DEVICE — WET SKIN PREP TRAY: Brand: MEDLINE INDUSTRIES, INC.

## (undated) DEVICE — SYRINGE 10ML LL CONTROL TOP

## (undated) DEVICE — JP CHAN DRN SIL HUBLESS 19FR W/TRO: Brand: CARDINAL HEALTH

## (undated) DEVICE — DRAPE SHEET THREE QUARTER

## (undated) DEVICE — SKIN MARKER DUAL TIP WITH RULER CAP, FLEXIBLE RULER AND LABELS: Brand: DEVON

## (undated) DEVICE — STERILE POLYISOPRENE POWDER-FREE SURGICAL GLOVES WITH EMOLLIENT COATING: Brand: PROTEXIS

## (undated) DEVICE — PENCIL SMOKE EVAC TELESCOPING W/TUBING

## (undated) DEVICE — JACKSON-PRATT 100CC BULB RESERVOIR: Brand: CARDINAL HEALTH

## (undated) DEVICE — SUT ETHILON 5-0 PS-2 18 IN 1666H

## (undated) DEVICE — TUBING ASPIRATION LIPOSUCTION SET 12FT

## (undated) DEVICE — SYRINGE 3ML LL

## (undated) DEVICE — NEEDLE BLUNT 18 G X 1 1/2IN

## (undated) DEVICE — TUBING INFILTRATION 9FT

## (undated) DEVICE — COTTON TIP APPLICTOR 2 PK

## (undated) DEVICE — CANNULA 4MM TRI-PORT III 22CM 10 MM PORT

## (undated) DEVICE — STANDARD SURGICAL GOWN, L: Brand: CONVERTORS

## (undated) DEVICE — DRESSING XEROFORM 5 X 9

## (undated) DEVICE — NEEDLE 25G X 1 1/2

## (undated) DEVICE — 1820 FOAM BLOCK NEEDLE COUNTER: Brand: DEVON

## (undated) DEVICE — SPONGE STICK WITH PVP-I: Brand: KENDALL

## (undated) DEVICE — SUT PROLENE 3-0 PC-5 18 IN 8632G

## (undated) DEVICE — SMOKE EVACUATION TUBING WITH 7/8" HOSE TO HOSE CONNECTOR: Brand: BUFFALO FILTER

## (undated) DEVICE — KERLIX BANDAGE ROLL: Brand: KERLIX

## (undated) DEVICE — SPONGE LAP 18 X 18 IN STRL RFD

## (undated) DEVICE — SUT ETHIBOND 0 CT-2 30 IN X412H

## (undated) DEVICE — SUT VICRYL 4-0 PS-2 18 IN J496G

## (undated) DEVICE — SUT VICRYL 2-0 J459H

## (undated) DEVICE — SCD SEQUENTIAL COMPRESSION COMFORT SLEEVE MEDIUM KNEE LENGTH: Brand: KENDALL SCD

## (undated) DEVICE — CANNISTER WASTE IMPLOSION PROOF

## (undated) DEVICE — NEEDLE FILTER 5 MICR 19G X 1.5IN

## (undated) DEVICE — 3M™ STERI-STRIP™ REINFORCED ADHESIVE SKIN CLOSURES, R1547, 1/2 IN X 4 IN (12 MM X 100 MM), 6 STRIPS/ENVELOPE: Brand: 3M™ STERI-STRIP™

## (undated) DEVICE — CRADLE EXTREMITY UNIVERSAL CONTOURED

## (undated) DEVICE — PACK UNIVERSAL DRAPES SUB-Q ICD

## (undated) DEVICE — TUBING SUCTION 5MM X 12 FT

## (undated) DEVICE — DRAPE TOWEL: Brand: CONVERTORS

## (undated) DEVICE — SUT ETHILON 3-0 PS-1 18 IN 1663H